# Patient Record
Sex: MALE | Race: WHITE | NOT HISPANIC OR LATINO | URBAN - METROPOLITAN AREA
[De-identification: names, ages, dates, MRNs, and addresses within clinical notes are randomized per-mention and may not be internally consistent; named-entity substitution may affect disease eponyms.]

---

## 2019-04-24 ENCOUNTER — OUTPATIENT (OUTPATIENT)
Dept: OUTPATIENT SERVICES | Facility: HOSPITAL | Age: 59
LOS: 1 days | Discharge: HOME | End: 2019-04-24
Payer: COMMERCIAL

## 2019-04-24 DIAGNOSIS — R91.8 OTHER NONSPECIFIC ABNORMAL FINDING OF LUNG FIELD: ICD-10-CM

## 2019-04-24 PROCEDURE — 71250 CT THORAX DX C-: CPT | Mod: 26

## 2019-07-23 ENCOUNTER — EMERGENCY (EMERGENCY)
Facility: HOSPITAL | Age: 59
LOS: 0 days | Discharge: HOME | End: 2019-07-24
Attending: EMERGENCY MEDICINE | Admitting: EMERGENCY MEDICINE
Payer: COMMERCIAL

## 2019-07-23 VITALS
RESPIRATION RATE: 18 BRPM | HEIGHT: 73 IN | OXYGEN SATURATION: 99 % | SYSTOLIC BLOOD PRESSURE: 136 MMHG | DIASTOLIC BLOOD PRESSURE: 79 MMHG | HEART RATE: 96 BPM | WEIGHT: 225.09 LBS | TEMPERATURE: 98 F

## 2019-07-23 DIAGNOSIS — M79.602 PAIN IN LEFT ARM: ICD-10-CM

## 2019-07-23 DIAGNOSIS — Z87.891 PERSONAL HISTORY OF NICOTINE DEPENDENCE: ICD-10-CM

## 2019-07-23 DIAGNOSIS — R07.89 OTHER CHEST PAIN: ICD-10-CM

## 2019-07-23 DIAGNOSIS — R07.9 CHEST PAIN, UNSPECIFIED: ICD-10-CM

## 2019-07-23 LAB
HCT VFR BLD CALC: 41.7 % — LOW (ref 42–52)
HGB BLD-MCNC: 14 G/DL — SIGNIFICANT CHANGE UP (ref 14–18)
MCHC RBC-ENTMCNC: 29.6 PG — SIGNIFICANT CHANGE UP (ref 27–31)
MCHC RBC-ENTMCNC: 33.6 G/DL — SIGNIFICANT CHANGE UP (ref 32–37)
MCV RBC AUTO: 88.2 FL — SIGNIFICANT CHANGE UP (ref 80–94)
NRBC # BLD: 0 /100 WBCS — SIGNIFICANT CHANGE UP (ref 0–0)
PLATELET # BLD AUTO: 192 K/UL — SIGNIFICANT CHANGE UP (ref 130–400)
RBC # BLD: 4.73 M/UL — SIGNIFICANT CHANGE UP (ref 4.7–6.1)
RBC # FLD: 13.2 % — SIGNIFICANT CHANGE UP (ref 11.5–14.5)
WBC # BLD: 5.39 K/UL — SIGNIFICANT CHANGE UP (ref 4.8–10.8)
WBC # FLD AUTO: 5.39 K/UL — SIGNIFICANT CHANGE UP (ref 4.8–10.8)

## 2019-07-23 PROCEDURE — 93010 ELECTROCARDIOGRAM REPORT: CPT

## 2019-07-23 PROCEDURE — 99285 EMERGENCY DEPT VISIT HI MDM: CPT | Mod: 25

## 2019-07-23 NOTE — ED ADULT NURSE NOTE - NSIMPLEMENTINTERV_GEN_ALL_ED
Implemented All Universal Safety Interventions:  [place] to call system. Call bell, personal items and telephone within reach. Instruct patient to call for assistance. Room bathroom lighting operational. Non-slip footwear when patient is off stretcher. Physically safe environment: no spills, clutter or unnecessary equipment. Stretcher in lowest position, wheels locked, appropriate side rails in place.

## 2019-07-24 VITALS
RESPIRATION RATE: 18 BRPM | TEMPERATURE: 97 F | HEART RATE: 70 BPM | DIASTOLIC BLOOD PRESSURE: 89 MMHG | SYSTOLIC BLOOD PRESSURE: 157 MMHG | OXYGEN SATURATION: 98 %

## 2019-07-24 LAB
ALBUMIN SERPL ELPH-MCNC: 3.9 G/DL — SIGNIFICANT CHANGE UP (ref 3.5–5.2)
ALP SERPL-CCNC: 73 U/L — SIGNIFICANT CHANGE UP (ref 30–115)
ALT FLD-CCNC: 31 U/L — SIGNIFICANT CHANGE UP (ref 0–41)
ANION GAP SERPL CALC-SCNC: 10 MMOL/L — SIGNIFICANT CHANGE UP (ref 7–14)
APTT BLD: 35.2 SEC — SIGNIFICANT CHANGE UP (ref 27–39.2)
AST SERPL-CCNC: 25 U/L — SIGNIFICANT CHANGE UP (ref 0–41)
BILIRUB SERPL-MCNC: 0.4 MG/DL — SIGNIFICANT CHANGE UP (ref 0.2–1.2)
BUN SERPL-MCNC: 18 MG/DL — SIGNIFICANT CHANGE UP (ref 10–20)
CALCIUM SERPL-MCNC: 9.4 MG/DL — SIGNIFICANT CHANGE UP (ref 8.5–10.1)
CHLORIDE SERPL-SCNC: 106 MMOL/L — SIGNIFICANT CHANGE UP (ref 98–110)
CO2 SERPL-SCNC: 28 MMOL/L — SIGNIFICANT CHANGE UP (ref 17–32)
CREAT SERPL-MCNC: 1 MG/DL — SIGNIFICANT CHANGE UP (ref 0.7–1.5)
GLUCOSE SERPL-MCNC: 105 MG/DL — HIGH (ref 70–99)
INR BLD: 1.13 RATIO — SIGNIFICANT CHANGE UP (ref 0.65–1.3)
POTASSIUM SERPL-MCNC: 4.7 MMOL/L — SIGNIFICANT CHANGE UP (ref 3.5–5)
POTASSIUM SERPL-SCNC: 4.7 MMOL/L — SIGNIFICANT CHANGE UP (ref 3.5–5)
PROT SERPL-MCNC: 6.7 G/DL — SIGNIFICANT CHANGE UP (ref 6–8)
PROTHROM AB SERPL-ACNC: 13 SEC — HIGH (ref 9.95–12.87)
SODIUM SERPL-SCNC: 144 MMOL/L — SIGNIFICANT CHANGE UP (ref 135–146)
TROPONIN T SERPL-MCNC: <0.01 NG/ML — SIGNIFICANT CHANGE UP
TROPONIN T SERPL-MCNC: <0.01 NG/ML — SIGNIFICANT CHANGE UP

## 2019-07-24 PROCEDURE — 75574 CT ANGIO HRT W/3D IMAGE: CPT | Mod: 26

## 2019-07-24 PROCEDURE — 99234 HOSP IP/OBS SM DT SF/LOW 45: CPT

## 2019-07-24 PROCEDURE — 71045 X-RAY EXAM CHEST 1 VIEW: CPT | Mod: 26

## 2019-07-24 PROCEDURE — 93010 ELECTROCARDIOGRAM REPORT: CPT

## 2019-07-24 RX ORDER — ACETAMINOPHEN 500 MG
650 TABLET ORAL ONCE
Refills: 0 | Status: COMPLETED | OUTPATIENT
Start: 2019-07-24 | End: 2019-07-24

## 2019-07-24 RX ORDER — METOPROLOL TARTRATE 50 MG
100 TABLET ORAL ONCE
Refills: 0 | Status: DISCONTINUED | OUTPATIENT
Start: 2019-07-24 | End: 2019-07-24

## 2019-07-24 RX ORDER — ASPIRIN/CALCIUM CARB/MAGNESIUM 324 MG
325 TABLET ORAL ONCE
Refills: 0 | Status: COMPLETED | OUTPATIENT
Start: 2019-07-24 | End: 2019-07-24

## 2019-07-24 RX ADMIN — Medication 325 MILLIGRAM(S): at 00:54

## 2019-07-24 RX ADMIN — Medication 650 MILLIGRAM(S): at 14:14

## 2019-07-24 NOTE — ED CDU PROVIDER DISPOSITION NOTE - CARE PROVIDER_API CALL
Jessie Mcclure)  Cardiovascular Disease; Internal Medicine; Interventional Cardiology  [address] [place] [address]  Phone: [phone]  Fax: [phone]  Follow Up Time:

## 2019-07-24 NOTE — ED CDU PROVIDER INITIAL DAY NOTE - ATTENDING CONTRIBUTION TO CARE
I personally evaluated the patient. I reviewed the Resident’s or Physician Assistant’s note (as assigned above), and agree with the findings and plan except as documented in my note.   58 Y/O M FORMER SMOKER, NO SIG PMHX WITH 10 DAYS OF CP. PT WAS SEEN BY DR. BROWN ON THE DAY PF PRESENTATION IN THE OFFICE AND EKG AT THAT TIME WAS NORMAL. PT PRESENTED TO ED WITH PERSISTENT CP. INITIAL ED WORKUP NORMAL. TROPONIN NORMAL AND EKG  WITH NO ISCHEMIC CHANGES. PT TO EDOU FOR CHEST PAIN PROTOCOL AND CCTA IN AM.

## 2019-07-24 NOTE — ED PROVIDER NOTE - OBJECTIVE STATEMENT
59y m former smoker no pmh p/w CP x 1.5 week. Rpts intermitt mid-sternal chest pressure felt @ night (wakes up from sleep) and when lying flat, @ rest, non-radiating, usu resolves spontaneously within 10 min. Also rpts recent george, but denies any sob felt when he has chest pain. No assoc f/c, diaphoresis, dizziness, cough, nv, abd pain, calf pain, edema. Saw his Cardiologist Dr. Mcclure today for his sx, had ekg in office that was normal, was sent home but pt said that if pain happened again he would come to ED. [place] tonight when lying down so came in. No recent falls or trauma. Frequent plane travel for work, last flew to [place] 1 week ago. Had stress test in Oct 2018 that was normal per pt. PMD Carmen. No FHx HD.

## 2019-07-24 NOTE — ED ADULT NURSE REASSESSMENT NOTE - NS ED NURSE REASSESS COMMENT FT1
received pt from main ED nurse .Pt presents at the main ED c/o left arm pain and right sided chest pain with right upper back pain x 10 days ..Right now , pt is resting comfortably on bed , denies chest pain this time , denies headache , denies dizziness , ambulatory , denies nausea, no vomiting noted , no labored breathing , SR on the monitor , troponin x 1 negative , safety measures provided , nursing rounds done , will continue to monitor

## 2019-07-24 NOTE — ED CDU PROVIDER DISPOSITION NOTE - ATTENDING CONTRIBUTION TO CARE
I personally evaluated the patient. I reviewed the Resident’s or Physician Assistant’s note (as assigned above), and agree with the findings and plan except as documented in my note.   50 Y/O M NO SIG PMHX, FORMER SMOKER PRESNETED TO ED WITH COMPLAINTS OF CP. INITIAL ED WORKUP NORMAL. EKG NORMAL. TROPONIN NORMAL. PT TO EDOU FOR SERIAL TROPONINS, EKGS AND PROVOCATIVE TESTING. PT WITH NO COMPLAINTS OVERNIGHT. PT RESTING COMFORTABLY. EXAM NORMAL THROUGHTOUT EDOU STAY. CCTA WITH CALCIUM SCORE-0. PT WITH 40MM ASCENDING THORACIC DILATION. RESULTS D/W PT. PT WITH NO PMD. PT GIVEN AN APPT IN MEDICAL CLINIC TOMORROW AT 4PM AND GIEN COPIES OF ALL RESULTS. TAA EXPLAINED TO PT AND IMPORTANCE OF FOLLOWUP STRESSED TO PT. PT VERBALIZED UNDERSTANDING. PT GIVEN STRICT RETURN INSTRUCTIONS. I personally evaluated the patient. I reviewed the Resident’s or Physician Assistant’s note (as assigned above), and agree with the findings and plan except as documented in my note.   60 Y/O M FORMER SMOKER, NO SIG PMHX WITH 10 DAYS OF CP. PT WAS SEEN BY DR. BROWN ON THE DAY PF PRESENTATION IN THE OFFICE AND EKG AT THAT TIME WAS NORMAL. PT PRESENTED TO ED WITH PERSISTENT CP. INITIAL ED WORKUP NORMAL. TROPONIN NORMAL AND EKG  WITH NO ISCHEMIC CHANGES. PT TO EDOU FOR CHEST PAIN PROTOCOL. NO EVENTS OVERNIGHT. CCTA RESULTS APPRECIATED. PT TO FOLLOW UP WITH HIS CARDIOLOGIST AS AN OUTPT.

## 2019-07-24 NOTE — ED ADULT NURSE REASSESSMENT NOTE - NS ED NURSE REASSESS COMMENT FT1
Pt reports right sided and left sided dull chest pain 6;'/10 , physician assistant at the bedside and examined the pt .Chest pain lasted for about 3 minutes from 0659 and was relieved at 0702.Pt denies any chest pain as of this time , denies headache , denies dizziness , denies arm pain , denies neck pain , SR on the monitor , vitals signs stable , no labored breathing , speaks in full sentences .EKG ordered and called EKG tech , endorsed pt to day shift primary nurse

## 2019-07-24 NOTE — ED CDU PROVIDER INITIAL DAY NOTE - OBJECTIVE STATEMENT
60y/o male with no significant pmh c/o left arm pain and right sided cp associated with right upper back pain for about 10 days. denies sob, fever, cough, vomiting, abdominal pain. pt. describes pain as "squeezing ". pt. states the pain only comes when he is supine and he has to sit up to alleviate the pain. no cp on exertion. last stress test was on 10/2019 which was normal as per pt. before pt. came to er today he went to see his cardiologist dr. Polanco, had an ekg done in his office and was sent home. pt. continued to have cp at home so he came here for evaluation. not a smoker.

## 2019-07-24 NOTE — ED CDU PROVIDER INITIAL DAY NOTE - MEDICAL DECISION MAKING DETAILS
58 Y/O M FORMER SMOKER, NO SIG PMHX WITH 10 DAYS OF CP. PT WAS SEEN BY DR. BROWN ON THE DAY PF PRESENTATION IN THE OFFICE AND EKG AT THAT TIME WAS NORMAL. PT PRESENTED TO ED WITH PERSISTENT CP. INITIAL ED WORKUP NORMAL. TROPONIN NORMAL AND EKG  WITH NO ISCHEMIC CHANGES. PT TO EDOU FOR CHEST PAIN PROTOCOL AND CCTA IN AM.

## 2019-07-24 NOTE — ED PROVIDER NOTE - PHYSICAL EXAMINATION
VITAL SIGNS: I have reviewed nursing notes and confirm.  CONSTITUTIONAL: Well-developed; well-nourished; in no acute distress.  SKIN: Skin exam is warm and dry, no acute rash.  HEAD: Normocephalic; atraumatic.  EYES: PERRL, EOM intact; conjunctiva and sclera clear.  ENT: No nasal discharge; airway clear.  NECK: Supple; non tender.  CARD: S1, S2 normal; no murmurs, gallops, or rubs. Regular rate and rhythm.  RESP: CTAB. No wheezes, rales or rhonchi.  ABD: Normal bowel sounds; soft; non-distended; non-tender; no hepatosplenomegaly.  BACK: non-tender, no CVAT  EXT: Normal ROM. No clubbing, cyanosis or edema. No calf erythema or ttp. DPI.  NEURO: Alert, oriented. Grossly unremarkable. No focal deficits.  PSYCH: Cooperative, appropriate.

## 2019-07-24 NOTE — ED ADULT NURSE REASSESSMENT NOTE - NS ED NURSE REASSESS COMMENT FT1
pt assessed at bedside, in NAD at this time, VSS, no c/o CP at this time, NSR on monitor, comfort measures offered/maintained. Pt had 1 episode of CP this morning that lasted few minutes

## 2019-07-24 NOTE — ED CDU PROVIDER DISPOSITION NOTE - CLINICAL COURSE
48 Y/O M NO SIG PMHX, FORMER SMOKER PRESNETED TO ED WITH COMPLAINTS OF CP. INITIAL ED WORKUP NORMAL. EKG NORMAL. TROPONIN NORMAL. PT TO EDOU FOR SERIAL TROPONINS, EKGS AND PROVOCATIVE TESTING. PT WITH NO COMPLAINTS OVERNIGHT. PT RESTING COMFORTABLY. EXAM NORMAL THROUGHTOUT EDOU STAY. CCTA WITH CALCIUM SCORE-0. PT WITH 40MM ASCENDING THORACIC DILATION. RESULTS D/W PT. PT WITH NO PMD. PT GIVEN AN APPT IN MEDICAL CLINIC TOMORROW AT 4PM AND GIEN COPIES OF ALL RESULTS. TAA EXPLAINED TO PT AND IMPORTANCE OF FOLLOWUP STRESSED TO PT. PT VERBALIZED UNDERSTANDING. PT GIVEN STRICT RETURN INSTRUCTIONS. 60 Y/O M FORMER SMOKER, NO SIG PMHX WITH 10 DAYS OF CP. PT WAS SEEN BY DR. BROWN ON THE DAY PF PRESENTATION IN THE OFFICE AND EKG AT THAT TIME WAS NORMAL. PT PRESENTED TO ED WITH PERSISTENT CP. INITIAL ED WORKUP NORMAL. TROPONIN NORMAL AND EKG  WITH NO ISCHEMIC CHANGES. PT TO EDOU FOR CHEST PAIN PROTOCOL. NO EVENTS OVERNIGHT. CCTA RESULTS APPRECIATED. PT TO FOLLOW UP WITH HIS CARDIOLOGIST AS AN OUTPT.

## 2019-09-24 ENCOUNTER — INPATIENT (INPATIENT)
Facility: HOSPITAL | Age: 59
LOS: 1 days | Discharge: HOME | End: 2019-09-26
Attending: INTERNAL MEDICINE | Admitting: INTERNAL MEDICINE
Payer: COMMERCIAL

## 2019-09-24 VITALS
TEMPERATURE: 97 F | DIASTOLIC BLOOD PRESSURE: 82 MMHG | SYSTOLIC BLOOD PRESSURE: 140 MMHG | WEIGHT: 229.94 LBS | HEIGHT: 73 IN | OXYGEN SATURATION: 98 % | HEART RATE: 73 BPM | RESPIRATION RATE: 18 BRPM

## 2019-09-24 DIAGNOSIS — E78.5 HYPERLIPIDEMIA, UNSPECIFIED: ICD-10-CM

## 2019-09-24 DIAGNOSIS — I25.110 ATHEROSCLEROTIC HEART DISEASE OF NATIVE CORONARY ARTERY WITH UNSTABLE ANGINA PECTORIS: ICD-10-CM

## 2019-09-24 DIAGNOSIS — K80.20 CALCULUS OF GALLBLADDER WITHOUT CHOLECYSTITIS WITHOUT OBSTRUCTION: ICD-10-CM

## 2019-09-24 DIAGNOSIS — I21.4 NON-ST ELEVATION (NSTEMI) MYOCARDIAL INFARCTION: ICD-10-CM

## 2019-09-24 DIAGNOSIS — R00.0 TACHYCARDIA, UNSPECIFIED: ICD-10-CM

## 2019-09-24 DIAGNOSIS — Z84.89 FAMILY HISTORY OF OTHER SPECIFIED CONDITIONS: ICD-10-CM

## 2019-09-24 LAB
ALBUMIN SERPL ELPH-MCNC: 4.4 G/DL — SIGNIFICANT CHANGE UP (ref 3.5–5.2)
ALP SERPL-CCNC: 76 U/L — SIGNIFICANT CHANGE UP (ref 30–115)
ALT FLD-CCNC: 36 U/L — SIGNIFICANT CHANGE UP (ref 0–41)
ANION GAP SERPL CALC-SCNC: 10 MMOL/L — SIGNIFICANT CHANGE UP (ref 7–14)
AST SERPL-CCNC: 25 U/L — SIGNIFICANT CHANGE UP (ref 0–41)
BILIRUB SERPL-MCNC: 0.8 MG/DL — SIGNIFICANT CHANGE UP (ref 0.2–1.2)
BUN SERPL-MCNC: 17 MG/DL — SIGNIFICANT CHANGE UP (ref 10–20)
CALCIUM SERPL-MCNC: 9.7 MG/DL — SIGNIFICANT CHANGE UP (ref 8.5–10.1)
CHLORIDE SERPL-SCNC: 102 MMOL/L — SIGNIFICANT CHANGE UP (ref 98–110)
CO2 SERPL-SCNC: 27 MMOL/L — SIGNIFICANT CHANGE UP (ref 17–32)
CREAT SERPL-MCNC: 1 MG/DL — SIGNIFICANT CHANGE UP (ref 0.7–1.5)
D DIMER BLD IA.RAPID-MCNC: 48 NG/ML DDU — SIGNIFICANT CHANGE UP (ref 0–230)
GLUCOSE BLDC GLUCOMTR-MCNC: 89 MG/DL — SIGNIFICANT CHANGE UP (ref 70–99)
GLUCOSE SERPL-MCNC: 90 MG/DL — SIGNIFICANT CHANGE UP (ref 70–99)
HCT VFR BLD CALC: 46.3 % — SIGNIFICANT CHANGE UP (ref 42–52)
HGB BLD-MCNC: 15.5 G/DL — SIGNIFICANT CHANGE UP (ref 14–18)
MCHC RBC-ENTMCNC: 29.6 PG — SIGNIFICANT CHANGE UP (ref 27–31)
MCHC RBC-ENTMCNC: 33.5 G/DL — SIGNIFICANT CHANGE UP (ref 32–37)
MCV RBC AUTO: 88.4 FL — SIGNIFICANT CHANGE UP (ref 80–94)
NRBC # BLD: 0 /100 WBCS — SIGNIFICANT CHANGE UP (ref 0–0)
PLATELET # BLD AUTO: 209 K/UL — SIGNIFICANT CHANGE UP (ref 130–400)
POTASSIUM SERPL-MCNC: 4.8 MMOL/L — SIGNIFICANT CHANGE UP (ref 3.5–5)
POTASSIUM SERPL-SCNC: 4.8 MMOL/L — SIGNIFICANT CHANGE UP (ref 3.5–5)
PROT SERPL-MCNC: 7.1 G/DL — SIGNIFICANT CHANGE UP (ref 6–8)
RBC # BLD: 5.24 M/UL — SIGNIFICANT CHANGE UP (ref 4.7–6.1)
RBC # FLD: 12.8 % — SIGNIFICANT CHANGE UP (ref 11.5–14.5)
SODIUM SERPL-SCNC: 139 MMOL/L — SIGNIFICANT CHANGE UP (ref 135–146)
TROPONIN T SERPL-MCNC: 0.19 NG/ML — CRITICAL HIGH
TROPONIN T SERPL-MCNC: 0.26 NG/ML — CRITICAL HIGH
WBC # BLD: 7 K/UL — SIGNIFICANT CHANGE UP (ref 4.8–10.8)
WBC # FLD AUTO: 7 K/UL — SIGNIFICANT CHANGE UP (ref 4.8–10.8)

## 2019-09-24 PROCEDURE — 93010 ELECTROCARDIOGRAM REPORT: CPT

## 2019-09-24 PROCEDURE — 99221 1ST HOSP IP/OBS SF/LOW 40: CPT

## 2019-09-24 PROCEDURE — 99285 EMERGENCY DEPT VISIT HI MDM: CPT

## 2019-09-24 PROCEDURE — 71275 CT ANGIOGRAPHY CHEST: CPT | Mod: 26

## 2019-09-24 PROCEDURE — 71046 X-RAY EXAM CHEST 2 VIEWS: CPT | Mod: 26

## 2019-09-24 PROCEDURE — 76705 ECHO EXAM OF ABDOMEN: CPT | Mod: 26

## 2019-09-24 RX ORDER — CHLORHEXIDINE GLUCONATE 213 G/1000ML
1 SOLUTION TOPICAL
Refills: 0 | Status: DISCONTINUED | OUTPATIENT
Start: 2019-09-24 | End: 2019-09-26

## 2019-09-24 RX ORDER — ASPIRIN/CALCIUM CARB/MAGNESIUM 324 MG
325 TABLET ORAL ONCE
Refills: 0 | Status: COMPLETED | OUTPATIENT
Start: 2019-09-24 | End: 2019-09-24

## 2019-09-24 RX ORDER — HEPARIN SODIUM 5000 [USP'U]/ML
5000 INJECTION INTRAVENOUS; SUBCUTANEOUS EVERY 8 HOURS
Refills: 0 | Status: DISCONTINUED | OUTPATIENT
Start: 2019-09-24 | End: 2019-09-24

## 2019-09-24 RX ORDER — INFLUENZA VIRUS VACCINE 15; 15; 15; 15 UG/.5ML; UG/.5ML; UG/.5ML; UG/.5ML
0.5 SUSPENSION INTRAMUSCULAR ONCE
Refills: 0 | Status: COMPLETED | OUTPATIENT
Start: 2019-09-24 | End: 2019-09-24

## 2019-09-24 RX ORDER — ATORVASTATIN CALCIUM 80 MG/1
80 TABLET, FILM COATED ORAL AT BEDTIME
Refills: 0 | Status: DISCONTINUED | OUTPATIENT
Start: 2019-09-24 | End: 2019-09-26

## 2019-09-24 RX ORDER — CLOPIDOGREL BISULFATE 75 MG/1
300 TABLET, FILM COATED ORAL ONCE
Refills: 0 | Status: COMPLETED | OUTPATIENT
Start: 2019-09-24 | End: 2019-09-24

## 2019-09-24 RX ORDER — PANTOPRAZOLE SODIUM 20 MG/1
40 TABLET, DELAYED RELEASE ORAL
Refills: 0 | Status: DISCONTINUED | OUTPATIENT
Start: 2019-09-24 | End: 2019-09-24

## 2019-09-24 RX ORDER — ASPIRIN/CALCIUM CARB/MAGNESIUM 324 MG
81 TABLET ORAL DAILY
Refills: 0 | Status: DISCONTINUED | OUTPATIENT
Start: 2019-09-25 | End: 2019-09-26

## 2019-09-24 RX ORDER — HEPARIN SODIUM 5000 [USP'U]/ML
5000 INJECTION INTRAVENOUS; SUBCUTANEOUS ONCE
Refills: 0 | Status: COMPLETED | OUTPATIENT
Start: 2019-09-24 | End: 2019-09-24

## 2019-09-24 RX ORDER — SODIUM CHLORIDE 9 MG/ML
1000 INJECTION, SOLUTION INTRAVENOUS
Refills: 0 | Status: DISCONTINUED | OUTPATIENT
Start: 2019-09-24 | End: 2019-09-24

## 2019-09-24 RX ORDER — CLOPIDOGREL BISULFATE 75 MG/1
75 TABLET, FILM COATED ORAL DAILY
Refills: 0 | Status: DISCONTINUED | OUTPATIENT
Start: 2019-09-25 | End: 2019-09-25

## 2019-09-24 RX ORDER — HEPARIN SODIUM 5000 [USP'U]/ML
1000 INJECTION INTRAVENOUS; SUBCUTANEOUS
Qty: 25000 | Refills: 0 | Status: DISCONTINUED | OUTPATIENT
Start: 2019-09-24 | End: 2019-09-25

## 2019-09-24 RX ORDER — METOPROLOL TARTRATE 50 MG
25 TABLET ORAL DAILY
Refills: 0 | Status: DISCONTINUED | OUTPATIENT
Start: 2019-09-24 | End: 2019-09-26

## 2019-09-24 RX ADMIN — HEPARIN SODIUM 5000 UNIT(S): 5000 INJECTION INTRAVENOUS; SUBCUTANEOUS at 22:33

## 2019-09-24 RX ADMIN — CLOPIDOGREL BISULFATE 300 MILLIGRAM(S): 75 TABLET, FILM COATED ORAL at 22:34

## 2019-09-24 RX ADMIN — Medication 25 MILLIGRAM(S): at 22:51

## 2019-09-24 RX ADMIN — HEPARIN SODIUM 10 UNIT(S)/HR: 5000 INJECTION INTRAVENOUS; SUBCUTANEOUS at 22:33

## 2019-09-24 RX ADMIN — Medication 325 MILLIGRAM(S): at 17:14

## 2019-09-24 NOTE — ED PROVIDER NOTE - ATTENDING CONTRIBUTION TO CARE
58 yo M presents to ED for CP that woke him up from sleep but resolved at 4am this morning. He has had a cardiac and GI workup. No recent travel, surgeries, LE swelling, history of DVT or PE.  EKG significant for tachycardia.     Due to recent normal coronaries ACS unlikely but will do screening troponin. Pain resolved at 4am therefore one troponin should be sufficient.     Will do abdominal US to ru gallbladder pathology.     Due to CP with tachycardia will order ddimer to ru DVT.

## 2019-09-24 NOTE — CONSULT NOTE ADULT - ASSESSMENT
59M with acute cholecystitis     Plan:   NPO, IVF  Pain control   Will discuss with surgical attending 59M with intermittent back pain radiating over RUQ and chest     Plan:   Pain is not likely to be caused by acute cholecystitis   No indication for surgical intervention at this time   Patient should be work up for elevated troponin in the setting of his chest pain   Cleared from a surgical standpoint

## 2019-09-24 NOTE — CONSULT NOTE ADULT - ATTENDING COMMENTS
upon my examination patient does not have any abdominal pain or tenderness. patient's pain  was mostly in the back and not associated with meals. Unlikely to have cholecystitis.   in light of elevated troponins back pain is likely due to MI - cardiology

## 2019-09-24 NOTE — H&P ADULT - ASSESSMENT
59M with acute cholecystitis     Plan:   NPO, IVF  Pain control   Full set of labs including type and screen and coags   Add on for lap ashlie today   HSQ, PPI   IS, SCDs
60 yo M comes to the ED for the atypical chest pain and RUQ tenderness.    # Atypical chest pain  - Aortic dissection ruled out  - CCTA in July was normal  - Aspirin Plavix, Lipitor and BB   - Heparin drip ACS protocol  - Check HbA1c, Lipid profile  - NPO after midnight for possible cath  - ECHO to rule out pericarditis     # RUQ pain  - Very minimal tenderness present on my exam  - Surgery follow up     # DVT ppx  - Heparin   - Plan discussed with the cardiology fellow

## 2019-09-24 NOTE — ED PROVIDER NOTE - CLINICAL SUMMARY MEDICAL DECISION MAKING FREE TEXT BOX
58 yo M presents to ED for CP. Patient had initial troponin of 0.19 will trend. Abdominal US demonstrated cholecystitis and surgery was consulted and would like him admitted. 60 yo M presents to ED for CP. Patient had US which was concerning for cholecystitis. Surgery was consulted but they do not want to admit him for surgery. Patient's initial troponin was 0.19. His repeat was 0.26. ASA given. Patient is not having any active CP.   Talked with the cardiologist. Patient admitted to medicine for NSTEMI.

## 2019-09-24 NOTE — ED PROVIDER NOTE - PHYSICAL EXAMINATION
VITAL SIGNS: I have reviewed nursing notes and confirm.  CONSTITUTIONAL: well-appearing, non-toxic, NAD  SKIN: Warm dry, normal skin turgor  HEAD: NCAT  CARD: regular rhythm, no murmurs, rubs or gallops. Mild tachycardia.  RESP: clear to ausculation b/l.  No rales, rhonchi, or wheezing.  ABD: soft, + BS, non-tender, non-distended, no rebound or guarding. No CVA tenderness  EXT: Full ROM, no bony tenderness, no pedal edema, no calf tenderness  NEURO: normal motor. normal sensory. CN II-XII intact. Cerebellar testing normal. Normal gait.  PSYCH: Cooperative, appropriate.

## 2019-09-24 NOTE — H&P ADULT - NSICDXNOPASTSURGICALHX_GEN_ALL_CORE
<-- Click to add NO significant Past Surgical History
<-- Click to add NO significant Past Surgical History

## 2019-09-24 NOTE — H&P ADULT - NSHPPHYSICALEXAM_GEN_ALL_CORE
PHYSICAL EXAM:  GENERAL: NAD, well-appearing  CHEST/LUNG: Clear to auscultation bilaterally  HEART: Regular rate and rhythm  ABDOMEN: Soft, mildly tender in RUQ, nondistended, slight murphys sign
T(C): 35.6 (09-24-19 @ 21:02), Max: 36.3 (09-24-19 @ 16:43)  HR: 68 (09-24-19 @ 22:00) (68 - 80)  BP: 154/96 (09-24-19 @ 22:00) (140/82 - 158/85)  RR: 22 (09-24-19 @ 22:00) (18 - 22)  SpO2: 98% (09-24-19 @ 22:00) (98% - 98%)    PHYSICAL EXAM:  GENERAL: NAD, well-developed  HEAD:  Atraumatic, Normocephalic  NECK: Supple, No JVD  CHEST/LUNG: Clear to auscultation bilaterally; No wheeze  HEART: Regular rate and rhythm; No murmurs, rubs, or gallops  ABDOMEN: Soft, Nontender, Nondistended; Bowel sounds present  EXTREMITIES:  2+ Peripheral Pulses, No clubbing, cyanosis, or edema  PSYCH: AAOx3  NEUROLOGY: non-focal  SKIN: No rashes or lesions

## 2019-09-24 NOTE — CONSULT NOTE ADULT - SUBJECTIVE AND OBJECTIVE BOX
VICKY SPENCER [id number]  59y Male    HPI:   Patient is a 59 year old male who denies any significant PMH who presented to the ED with sharp RUQ abdominal pain that woke him up from sleep this AM. He states that the pain radiates to his back and lower chest wall as well. Of note, he has been having vague CP/Abdominal pain for the past 3-4 months that was worked up including Upper endoscopy and CT that did not show any significant pathology. He denies any other symptoms at this time. In the ED RUQ US was done that showed evidence of acute cholecystitis.       PAST MEDICAL & SURGICAL HISTORY:  No pertinent past medical history  No significant past surgical history    MEDICATIONS  (STANDING):    MEDICATIONS  (PRN):      Allergies:  Allergy Status Unknown    REVIEW OF SYSTEMS    [x] A ten-point review of systems was otherwise negative except as noted.  [ ] Due to altered mental status/intubation, subjective information were not able to be obtained from the patient. History was obtained, to the extent possible, from review of the chart and collateral sources of information.      Vital Signs Last 24 Hrs  T(C): 36.2 (24 Sep 2019 09:18), Max: 36.2 (24 Sep 2019 09:18)  T(F): 97.2 (24 Sep 2019 09:18), Max: 97.2 (24 Sep 2019 09:18)  HR: 73 (24 Sep 2019 09:18) (73 - 73)  BP: 140/82 (24 Sep 2019 09:18) (140/82 - 140/82)  BP(mean): --  RR: 18 (24 Sep 2019 09:18) (18 - 18)  SpO2: 98% (24 Sep 2019 09:18) (98% - 98%)    PHYSICAL EXAM:  GENERAL: NAD, well-appearing  CHEST/LUNG: Clear to auscultation bilaterally  HEART: Regular rate and rhythm  ABDOMEN: Soft, tender in RUQ, nondistended      LABS:                      15.5   7.00  )-----------( 209      ( 24 Sep 2019 11:01 )             46.3         09-24    139  |  102  |  17  ----------------------------<  90  4.8   |  27  |  1.0      Calcium, Total Serum: 9.7 mg/dL (09-24-19 @ 11:01)      LFTs:             7.1  | 0.8  | 25       ------------------[76      ( 24 Sep 2019 11:01 )  4.4  | x    | 36             RADIOLOGY & ADDITIONAL STUDIES:  < from: US Abdomen Limited (09.24.19 @ 11:52) >    GALLBLADDER/BILIARY TREE:  Cholelithiasis; nonmobile stone seen in the   gallbladder neck. Thickened, edematous gallbladder wall measures 3 mm.   Trace pericholecystic fluid noted. Reported positive sonographic Lim's   sign. No intrahepatic biliary ductal dilatation. The common bile duct   measures 4 mm, which is normal.     IMPRESSION:    1.Cholelithiasis with sonographic findings compatible with acute   cholecystitis.    2.  Hepatic steatosis.    < end of copied text >

## 2019-09-24 NOTE — H&P ADULT - NSHPLABSRESULTS_GEN_ALL_CORE
.  LABS:                         15.5   7.00  )-----------( 209      ( 24 Sep 2019 11:01 )             46.3     09-24    139  |  102  |  17  ----------------------------<  90  4.8   |  27  |  1.0    Ca    9.7      24 Sep 2019 11:01    TPro  7.1  /  Alb  4.4  /  TBili  0.8  /  DBili  x   /  AST  25  /  ALT  36  /  AlkPhos  76  09-24        RADIOLOGY, EKG & ADDITIONAL TESTS: Reviewed.   < from: US Abdomen Limited (09.24.19 @ 11:52) >    GALLBLADDER/BILIARY TREE:  Cholelithiasis; nonmobile stone seen in the   gallbladder neck. Thickened, edematous gallbladder wall measures 3 mm.   Trace pericholecystic fluid noted. Reported positive sonographic Lim's   sign. No intrahepatic biliary ductal dilatation. The common bile duct   measures 4 mm, which is normal.     IMPRESSION:    1.Cholelithiasis with sonographic findings compatible with acute   cholecystitis.    2.  Hepatic steatosis.    < end of copied text >
15.5   7.00  )-----------( 209      ( 24 Sep 2019 11:01 )             46.3       09-24    139  |  102  |  17  ----------------------------<  90  4.8   |  27  |  1.0    Ca    9.7      24 Sep 2019 11:01    TPro  7.1  /  Alb  4.4  /  TBili  0.8  /  DBili  x   /  AST  25  /  ALT  36  /  AlkPhos  76  09-24        < from: 12 Lead ECG (09.24.19 @ 18:08) >    Ventricular Rate 73 BPM    Atrial Rate 73 BPM    P-R Interval 152 ms    QRS Duration 88 ms    Q-T Interval 406 ms    QTC Calculation(Bezet) 447 ms    P Axis 58 degrees    R Axis 41 degrees    T Axis 9 degrees    Diagnosis Line Normal sinus rhythm  Normal ECG      < end of copied text >

## 2019-09-24 NOTE — CONSULT NOTE ADULT - SUBJECTIVE AND OBJECTIVE BOX
HPI:  59 yrs old with no PMH presented for chest pain . pt has been having intermittent back/chest pain since 2 months , had a negative CCTA back in July.  he described as tearing back pain radiating to his chest and left arm , lasting for 30 min then resolves spontaneously. last time he had this episode was last night at 3am. currently pain free. pt denied any other assoicated SX . no bleeding Disorder . + Family Hx of Aortic Dissection.    PAST MEDICAL & SURGICAL HISTORY  No pertinent past medical history  No significant past surgical history      FAMILY HISTORY:  FAMILY HISTORY:  Aortic Dissection    SOCIAL HISTORY:  [-]smoker  [-]Alcohol  [-]Drug    ALLERGIES:  Allergy Status Unknown      MEDICATIONS:  MEDICATIONS  (STANDING):  aspirin 325 milliGRAM(s) Oral Once    MEDICATIONS  (PRN):      HOME MEDICATIONS:  Home Medications:  none    VITALS:   T(F): 97.2 (09-24 @ 09:18), Max: 97.2 (09-24 @ 09:18)  HR: 73 (09-24 @ 09:18) (73 - 73)  BP: 140/82 (09-24 @ 09:18) (140/82 - 140/82)  BP(mean): --  RR: 18 (09-24 @ 09:18) (18 - 18)  SpO2: 98% (09-24 @ 09:18) (98% - 98%)    I&O's Summary      REVIEW OF SYSTEMS:  CONSTITUTIONAL: No weakness, fevers or chills  EYES: No visual changes  ENT: No vertigo or throat pain   NECK: No pain or stiffness  RESPIRATORY: No cough, wheezing, hemoptysis; No shortness of breath  CARDIOVASCULAR: see HPI  GASTROINTESTINAL: No abdominal or epigastric pain. No nausea, vomiting, or hematemesis; No diarrhea or constipation. No melena or hematochezia.  GENITOURINARY: No dysuria, frequency or hematuria  NEUROLOGICAL: No numbness or weakness  SKIN: No itching, no rashes  MSK: no MSK pain    PHYSICAL EXAM:  NEURO: patient is awake , alert and oriented  GEN: Not in acute distress  NECK: no thyroid enlargement, no JVD  LUNGS: Clear to auscultation bilaterally   CARDIOVASCULAR: S1/S2 present, RRR , no murmurs  ABD: Soft, non-tender, non-distended, +BS  EXT: No TRINO  SKIN: Intact    LABS:                        15.5   7.00  )-----------( 209      ( 24 Sep 2019 11:01 )             46.3     09-24    139  |  102  |  17  ----------------------------<  90  4.8   |  27  |  1.0    Ca    9.7      24 Sep 2019 11:01    TPro  7.1  /  Alb  4.4  /  TBili  0.8  /  DBili  x   /  AST  25  /  ALT  36  /  AlkPhos  76  09-24      Troponin T, Serum: 0.26 ng/mL <HH> (09-24-19 @ 14:40)  Troponin T, Serum: 0.19 ng/mL <HH> (09-24-19 @ 11:01)    CARDIAC MARKERS ( 24 Sep 2019 14:40 )  x     / 0.26 ng/mL / x     / x     / x      CARDIAC MARKERS ( 24 Sep 2019 11:01 )  x     / 0.19 ng/mL / x     / x     / x          RADIOLOGY:  -CXR:< from: Xray Chest 2 Views PA/Lat (09.24.19 @ 14:23) >  Impression:      No radiographic evidence of acute cardiopulmonary disease.    < end of copied text >    CCTA < from: CT Heart with Coronaries (07.24.19 @ 12:14) >  Patent coronary arteries. No significant stenosis. The total [place]at[place]   coronary artery calcium score equals 0. CAD-RADS 0.    < end of copied text >      ECG:  sinus with non specific Diffuse St depressions

## 2019-09-24 NOTE — ED PROVIDER NOTE - NS ED ROS FT
Constitutional: See HPI.  Cardiac: No SOB or edema. No chest pain with exertion.  Respiratory: No cough or respiratory distress. No hemoptysis. No history of asthma or RAD.  GI: No nausea, vomiting, diarrhea or abdominal pain.  : No dysuria, frequency or burning. No Discharge  MS: No myalgia, muscle weakness, joint pain or back pain.  Neuro: No headache or weakness. No LOC.  Skin: No skin rash.  Except as documented in the HPI, all other systems are negative. Constitutional: See HPI.  Cardiac: No SOB or edema. No chest pain with exertion. +CP  Respiratory: No cough or respiratory distress. No hemoptysis. No history of asthma or RAD.  GI: No nausea, vomiting, diarrhea or abdominal pain.  : No dysuria, frequency or burning. No Discharge  MS: No myalgia, muscle weakness, joint pain or back pain.  Neuro: No headache or weakness. No LOC.  Skin: No skin rash.  Except as documented in the HPI, all other systems are negative.

## 2019-09-24 NOTE — ED PROVIDER NOTE - PROGRESS NOTE DETAILS
Patient has acute cholecystitis on US. Surgery consulted Surgery would like patient admitted for acute cholecystitis Surgery decided they do not want patient admitted. Will repeat troponin at 3pm. to determine dispo Talked with cardiology. They recommend CTA dissection study. Ordered and talked to MAR who will fu results.

## 2019-09-24 NOTE — ED ADULT TRIAGE NOTE - CHIEF COMPLAINT QUOTE
"every night for the past 2 months I have upper middle back pain that radiates to my chest and down my left "

## 2019-09-24 NOTE — H&P ADULT - HISTORY OF PRESENT ILLNESS
Patient is a 59 year old male who denies any significant PMH who presented to the ED with sharp RUQ abdominal pain that woke him up from sleep this AM. He states that the pain radiates to his back and lower chest wall as well. Of note, he has been having vague CP/Abdominal pain for the past 3-4 months that was worked up including Upper endoscopy and CT that did not show any significant pathology. He denies any other symptoms at this time. In the ED RUQ US was done that showed evidence of acute cholecystitis.
58 yo M with no significant PMH comes to the ED for the chest pain x 3 months. The pain is non exertional, tearing in nature starts in the back and radiates to the front and in the left arm. The pain lasts for few minutes to few hours and resolve on its own.    He denies any sob, palpitations, leg swelling, heart burn symptoms. He recently had CCTA in July that was normal. EGD was also done recently that is normal. The gastroenterologist was thinking to start cardizem for the possible esophageal spasm. He had some RUQ tenderness. The US in the ED showed cholelithiasis and acute cholecystitis . He denies nausea, vomiting or odynophagia.

## 2019-09-24 NOTE — CONSULT NOTE ADULT - ASSESSMENT
chest/back pain: r/o aortic dissection , ? Angina ( pain typical , with mild St depressions on ECG and elevated trop , however pt had normal CCTA 2 months ago)      recommendation :  check CTA to r/o aortic dissection  repeat CE , check CKMB  admit to CCU  hold on asa/ or AC for now until CTA result  check 2d echo  monitor BP  plan to follow after CTA result  will discuss with attending

## 2019-09-24 NOTE — ED PROVIDER NOTE - OBJECTIVE STATEMENT
Patient is a 58 y/o M with no significant pmhx complaining of back, chest, and arm pain for 3 to 4 months' duration that awakens him from sleep. He states that the pain originates in his mid upper back and radiates into his chest and then his left arm in particular. He rates the pain as a 10/10 severity and lasts for 30 minutes to an hour. He initially tried aspirin and tylenol with no relief of pain so he has discontinued using them. He denies associated symptoms such as exertional chest pain, shortness of breath, nausea, vomiting, headache, dizziness, changes in urinary and bowel habits, sick contacts, and recent travel outside the [place]. Patient recently went to his cardiologist and had a cardiac CT scan done which did not show any significant abnormalities. He also went to GI and had an endoscopy done which did not show any abnormalities. Patient is a 58 y/o M with no significant pmhx complaining of back, chest, and arm pain for 3 to 4 months' duration that awakens him from sleep. He states that the pain originates in his mid upper back and radiates into his chest and then his left arm in particular. He rates the pain as a 10/10 severity and lasts for 30 minutes to an hour. He initially tried aspirin and tylenol with no relief of pain so he has discontinued using them. He denies associated symptoms such as exertional chest pain, shortness of breath, nausea, vomiting, headache, dizziness, changes in urinary and bowel habits, sick contacts, and recent travel outside the [place]. Patient recently went to his cardiologist and had a cardiac CT scan done which did not show any significant abnormalities. He also went to GI and had an endoscopy done which did not show any abnormalities.  In ED patient is currently asymptomatic.   He does get pain after eating fatty meals.

## 2019-09-24 NOTE — H&P ADULT - NSICDXNOPASTMEDICALHX_GEN_ALL_CORE
<-- Click to add NO pertinent Past Medical History
<-- Click to add NO pertinent Past Medical History

## 2019-09-25 LAB
ALBUMIN SERPL ELPH-MCNC: 4 G/DL — SIGNIFICANT CHANGE UP (ref 3.5–5.2)
ALP SERPL-CCNC: 70 U/L — SIGNIFICANT CHANGE UP (ref 30–115)
ALT FLD-CCNC: 34 U/L — SIGNIFICANT CHANGE UP (ref 0–41)
ANION GAP SERPL CALC-SCNC: 11 MMOL/L — SIGNIFICANT CHANGE UP (ref 7–14)
APTT BLD: 36.6 SEC — SIGNIFICANT CHANGE UP (ref 27–39.2)
APTT BLD: 47 SEC — HIGH (ref 27–39.2)
AST SERPL-CCNC: 23 U/L — SIGNIFICANT CHANGE UP (ref 0–41)
BASOPHILS # BLD AUTO: 0.02 K/UL — SIGNIFICANT CHANGE UP (ref 0–0.2)
BASOPHILS NFR BLD AUTO: 0.4 % — SIGNIFICANT CHANGE UP (ref 0–1)
BILIRUB SERPL-MCNC: 0.6 MG/DL — SIGNIFICANT CHANGE UP (ref 0.2–1.2)
BUN SERPL-MCNC: 15 MG/DL — SIGNIFICANT CHANGE UP (ref 10–20)
CALCIUM SERPL-MCNC: 9.1 MG/DL — SIGNIFICANT CHANGE UP (ref 8.5–10.1)
CHLORIDE SERPL-SCNC: 103 MMOL/L — SIGNIFICANT CHANGE UP (ref 98–110)
CHOLEST SERPL-MCNC: 168 MG/DL — SIGNIFICANT CHANGE UP (ref 100–200)
CK MB CFR SERPL CALC: 2.8 NG/ML — SIGNIFICANT CHANGE UP (ref 0.6–6.3)
CK MB CFR SERPL CALC: 3.2 NG/ML — SIGNIFICANT CHANGE UP (ref 0.6–6.3)
CK SERPL-CCNC: 103 U/L — SIGNIFICANT CHANGE UP (ref 0–225)
CK SERPL-CCNC: 96 U/L — SIGNIFICANT CHANGE UP (ref 0–225)
CO2 SERPL-SCNC: 27 MMOL/L — SIGNIFICANT CHANGE UP (ref 17–32)
CREAT SERPL-MCNC: 0.7 MG/DL — SIGNIFICANT CHANGE UP (ref 0.7–1.5)
EOSINOPHIL # BLD AUTO: 0.11 K/UL — SIGNIFICANT CHANGE UP (ref 0–0.7)
EOSINOPHIL NFR BLD AUTO: 2.2 % — SIGNIFICANT CHANGE UP (ref 0–8)
ERYTHROCYTE [SEDIMENTATION RATE] IN BLOOD: 17 MM/HR — HIGH (ref 0–10)
ESTIMATED AVERAGE GLUCOSE: 103 MG/DL — SIGNIFICANT CHANGE UP (ref 68–114)
ESTIMATED AVERAGE GLUCOSE: 278 MG/DL — HIGH (ref 68–114)
GLUCOSE BLDC GLUCOMTR-MCNC: 104 MG/DL — HIGH (ref 70–99)
GLUCOSE BLDC GLUCOMTR-MCNC: 117 MG/DL — HIGH (ref 70–99)
GLUCOSE SERPL-MCNC: 87 MG/DL — SIGNIFICANT CHANGE UP (ref 70–99)
HBA1C BLD-MCNC: 11.3 % — HIGH (ref 4–5.6)
HBA1C BLD-MCNC: 5.2 % — SIGNIFICANT CHANGE UP (ref 4–5.6)
HCT VFR BLD CALC: 42.9 % — SIGNIFICANT CHANGE UP (ref 42–52)
HDLC SERPL-MCNC: 33 MG/DL — LOW
HGB BLD-MCNC: 14.4 G/DL — SIGNIFICANT CHANGE UP (ref 14–18)
IMM GRANULOCYTES NFR BLD AUTO: 0.4 % — HIGH (ref 0.1–0.3)
LIPID PNL WITH DIRECT LDL SERPL: 118 MG/DL — SIGNIFICANT CHANGE UP (ref 4–129)
LYMPHOCYTES # BLD AUTO: 1.99 K/UL — SIGNIFICANT CHANGE UP (ref 1.2–3.4)
LYMPHOCYTES # BLD AUTO: 39.6 % — SIGNIFICANT CHANGE UP (ref 20.5–51.1)
MCHC RBC-ENTMCNC: 29.4 PG — SIGNIFICANT CHANGE UP (ref 27–31)
MCHC RBC-ENTMCNC: 33.6 G/DL — SIGNIFICANT CHANGE UP (ref 32–37)
MCV RBC AUTO: 87.6 FL — SIGNIFICANT CHANGE UP (ref 80–94)
MONOCYTES # BLD AUTO: 0.65 K/UL — HIGH (ref 0.1–0.6)
MONOCYTES NFR BLD AUTO: 12.9 % — HIGH (ref 1.7–9.3)
NEUTROPHILS # BLD AUTO: 2.24 K/UL — SIGNIFICANT CHANGE UP (ref 1.4–6.5)
NEUTROPHILS NFR BLD AUTO: 44.5 % — SIGNIFICANT CHANGE UP (ref 42.2–75.2)
NRBC # BLD: 0 /100 WBCS — SIGNIFICANT CHANGE UP (ref 0–0)
PLATELET # BLD AUTO: 186 K/UL — SIGNIFICANT CHANGE UP (ref 130–400)
POTASSIUM SERPL-MCNC: 4.3 MMOL/L — SIGNIFICANT CHANGE UP (ref 3.5–5)
POTASSIUM SERPL-SCNC: 4.3 MMOL/L — SIGNIFICANT CHANGE UP (ref 3.5–5)
PROT SERPL-MCNC: 6.3 G/DL — SIGNIFICANT CHANGE UP (ref 6–8)
RBC # BLD: 4.9 M/UL — SIGNIFICANT CHANGE UP (ref 4.7–6.1)
RBC # FLD: 12.9 % — SIGNIFICANT CHANGE UP (ref 11.5–14.5)
SODIUM SERPL-SCNC: 141 MMOL/L — SIGNIFICANT CHANGE UP (ref 135–146)
TOTAL CHOLESTEROL/HDL RATIO MEASUREMENT: 5.1 RATIO — SIGNIFICANT CHANGE UP (ref 4–5.5)
TRIGL SERPL-MCNC: 237 MG/DL — HIGH (ref 10–149)
TROPONIN T SERPL-MCNC: 0.22 NG/ML — CRITICAL HIGH
TROPONIN T SERPL-MCNC: 0.26 NG/ML — CRITICAL HIGH
WBC # BLD: 5.03 K/UL — SIGNIFICANT CHANGE UP (ref 4.8–10.8)
WBC # FLD AUTO: 5.03 K/UL — SIGNIFICANT CHANGE UP (ref 4.8–10.8)

## 2019-09-25 PROCEDURE — 93306 TTE W/DOPPLER COMPLETE: CPT | Mod: 26

## 2019-09-25 RX ORDER — TICAGRELOR 90 MG/1
1 TABLET ORAL
Qty: 60 | Refills: 0
Start: 2019-09-25 | End: 2019-10-24

## 2019-09-25 RX ORDER — SODIUM CHLORIDE 9 MG/ML
1000 INJECTION INTRAMUSCULAR; INTRAVENOUS; SUBCUTANEOUS
Refills: 0 | Status: DISCONTINUED | OUTPATIENT
Start: 2019-09-25 | End: 2019-09-25

## 2019-09-25 RX ORDER — TICAGRELOR 90 MG/1
90 TABLET ORAL EVERY 12 HOURS
Refills: 0 | Status: DISCONTINUED | OUTPATIENT
Start: 2019-09-25 | End: 2019-09-26

## 2019-09-25 RX ORDER — ACETAMINOPHEN 500 MG
650 TABLET ORAL ONCE
Refills: 0 | Status: COMPLETED | OUTPATIENT
Start: 2019-09-25 | End: 2019-09-25

## 2019-09-25 RX ORDER — IBUPROFEN 200 MG
400 TABLET ORAL ONCE
Refills: 0 | Status: COMPLETED | OUTPATIENT
Start: 2019-09-25 | End: 2019-09-25

## 2019-09-25 RX ORDER — ONDANSETRON 8 MG/1
4 TABLET, FILM COATED ORAL ONCE
Refills: 0 | Status: COMPLETED | OUTPATIENT
Start: 2019-09-25 | End: 2019-09-25

## 2019-09-25 RX ORDER — HEPARIN SODIUM 5000 [USP'U]/ML
5000 INJECTION INTRAVENOUS; SUBCUTANEOUS EVERY 12 HOURS
Refills: 0 | Status: DISCONTINUED | OUTPATIENT
Start: 2019-09-25 | End: 2019-09-26

## 2019-09-25 RX ORDER — ASPIRIN/CALCIUM CARB/MAGNESIUM 324 MG
1 TABLET ORAL
Qty: 30 | Refills: 0
Start: 2019-09-25 | End: 2019-10-24

## 2019-09-25 RX ADMIN — CLOPIDOGREL BISULFATE 75 MILLIGRAM(S): 75 TABLET, FILM COATED ORAL at 07:07

## 2019-09-25 RX ADMIN — ATORVASTATIN CALCIUM 80 MILLIGRAM(S): 80 TABLET, FILM COATED ORAL at 21:25

## 2019-09-25 RX ADMIN — Medication 400 MILLIGRAM(S): at 19:10

## 2019-09-25 RX ADMIN — ONDANSETRON 4 MILLIGRAM(S): 8 TABLET, FILM COATED ORAL at 20:43

## 2019-09-25 RX ADMIN — SODIUM CHLORIDE 50 MILLILITER(S): 9 INJECTION INTRAMUSCULAR; INTRAVENOUS; SUBCUTANEOUS at 14:44

## 2019-09-25 RX ADMIN — HEPARIN SODIUM 5000 UNIT(S): 5000 INJECTION INTRAVENOUS; SUBCUTANEOUS at 18:36

## 2019-09-25 RX ADMIN — Medication 650 MILLIGRAM(S): at 15:15

## 2019-09-25 RX ADMIN — Medication 650 MILLIGRAM(S): at 14:43

## 2019-09-25 RX ADMIN — Medication 81 MILLIGRAM(S): at 07:07

## 2019-09-25 NOTE — PROGRESS NOTE ADULT - SUBJECTIVE AND OBJECTIVE BOX
LENGTH OF HOSPITAL STAY: 1d      CHIEF COMPLAINT:   Patient is a 59y old  Male who presents with a chief complaint of Chest pain (25 Sep 2019 07:28)      OVER Past 24hrs:  The patient was seen and examined at bedside there were    HISTORY OF PRESENTING ILLNESS:    HPI:  60 yo M with no significant PMH comes to the ED for the chest pain x 3 months. The pain is non exertional, tearing in nature starts in the back and radiates to the front and in the left arm. The pain lasts for few minutes to few hours and resolve on its own.    He denies any sob, palpitations, leg swelling, heart burn symptoms. He recently had CCTA in July that was normal. EGD was also done recently that is normal. The gastroenterologist was thinking to start cardizem for the possible esophageal spasm. He had some RUQ tenderness. The US in the ED showed cholelithiasis and acute cholecystitis . He denies nausea, vomiting or odynophagia. (24 Sep 2019 22:07)    PAST MEDICAL & SURGICAL HISTORY  PAST MEDICAL & SURGICAL HISTORY:  No pertinent past medical history  No significant past surgical history        REVIEW OF SYSTEMS  CONSTITUTIONAL: No fever, weight loss, or fatigue  EYES: No eye pain, visual disturbances, or discharge  ENMT:  No difficulty hearing, tinnitus, vertigo; No sinus or throat pain  NECK: No pain or stiffness  BREASTS: No pain, masses, or nipple discharge  RESPIRATORY: No cough, wheezing, chills or hemoptysis; No shortness of breath  CARDIOVASCULAR: No chest pain, palpitations, dizziness, or leg swelling  GASTROINTESTINAL: No abdominal or epigastric pain. No nausea, vomiting, or hematemesis; No diarrhea or constipation. No melena or hematochezia.  GENITOURINARY: No dysuria, frequency, hematuria, or incontinence  NEUROLOGICAL: No headaches, memory loss, loss of strength, numbness, or tremors  SKIN: No itching, burning, rashes, or lesions   LYMPH NODES: No enlarged glands  ENDOCRINE: No heat or cold intolerance; No hair loss  MUSCULOSKELETAL: No joint pain or swelling; No muscle, back, or extremity pain  PSYCHIATRIC: No depression, anxiety, mood swings, or difficulty sleeping  HEME/LYMPH: No easy bruising, or bleeding gums  ALLERY AND IMMUNOLOGIC: No hives or eczema    ALLERGIES:  No Known Allergies    MEDICATIONS:  STANDING MEDICATIONS  aspirin  chewable 81 milliGRAM(s) Oral daily  atorvastatin 80 milliGRAM(s) Oral at bedtime  chlorhexidine 4% Liquid 1 Application(s) Topical <User Schedule>  influenza   Vaccine 0.5 milliLiter(s) IntraMuscular once  metoprolol succinate ER 25 milliGRAM(s) Oral daily  ticagrelor 90 milliGRAM(s) Oral every 12 hours    PRN MEDICATIONS    VITALS:   T(F): 97.7  HR: 74  BP: 153/89  RR: 18  SpO2: 97%    PHYSICAL EXAM:  General: No acute distress.  Alert, oriented, interactive, nonfocal.    HEENT: Pupils equal, reactive to light symmetrically.    PULM: Clear to auscultation bilaterally, no significant sputum production.    CVS: Regular rate and rhythm, no murmurs, rubs, or gallops.    ABD: Soft, nondistended, nontender, no masses.    EXT: No edema, nontender.    SKIN: Warm and well perfused, no rashes noted.    LABS:                        14.4   5.03  )-----------( 186      ( 25 Sep 2019 05:02 )             42.9     09-25    141  |  103  |  15  ----------------------------<  87  4.3   |  27  |  0.7    Ca    9.1      25 Sep 2019 05:02    TPro  6.3  /  Alb  4.0  /  TBili  0.6  /  DBili  x   /  AST  23  /  ALT  34  /  AlkPhos  70  09-25    PTT - ( 25 Sep 2019 07:30 )  PTT:47.0 sec      Troponin T, Serum: 0.26 ng/mL <HH> (09-25-19 @ 05:02)  Creatine Kinase, Serum: 96 U/L (09-25-19 @ 05:02)  Creatine Kinase, Serum: 103 U/L (09-25-19 @ 00:58)  Troponin T, Serum: 0.22 ng/mL <HH> (09-25-19 @ 00:58)  Sedimentation Rate, Erythrocyte: 17 mm/Hr <H> (09-25-19 @ 00:58)  Troponin T, Serum: 0.26 ng/mL <HH> (09-24-19 @ 14:40)      CARDIAC MARKERS ( 25 Sep 2019 05:02 )  x     / 0.26 ng/mL / 96 U/L / x     / 2.8 ng/mL  CARDIAC MARKERS ( 25 Sep 2019 00:58 )  x     / 0.22 ng/mL / 103 U/L / x     / 3.2 ng/mL  CARDIAC MARKERS ( 24 Sep 2019 14:40 )  x     / 0.26 ng/mL / x     / x     / x      CARDIAC MARKERS ( 24 Sep 2019 11:01 )  x     / 0.19 ng/mL / x     / x     / x          RADIOLOGY: LENGTH OF HOSPITAL STAY: 1d      CHIEF COMPLAINT:   Patient is a 59y old  Male who presents with a chief complaint of Chest pain (25 Sep 2019 07:28)      OVER Past 24hrs:  The patient was seen and examined at bedside there were no acute events during the night. He  currently denies fever chills chest pain  or shortness of breath.         PAST MEDICAL & SURGICAL HISTORY  PAST MEDICAL & SURGICAL HISTORY:  No pertinent past medical history  No significant past surgical history        REVIEW OF SYSTEMS  CONSTITUTIONAL: No fever  RESPIRATORY: No cough, wheezing, chills or hemoptysis; No shortness of breath  CARDIOVASCULAR: No chest pain, palpitations, dizziness, or leg swelling    ALLERGIES:  No Known Allergies    MEDICATIONS:  STANDING MEDICATIONS  aspirin  chewable 81 milliGRAM(s) Oral daily  atorvastatin 80 milliGRAM(s) Oral at bedtime  chlorhexidine 4% Liquid 1 Application(s) Topical <User Schedule>  influenza   Vaccine 0.5 milliLiter(s) IntraMuscular once  metoprolol succinate ER 25 milliGRAM(s) Oral daily  ticagrelor 90 milliGRAM(s) Oral every 12 hours    PRN MEDICATIONS    VITALS:   T(F): 97.7  HR: 74  BP: 153/89  RR: 18  SpO2: 97%    PHYSICAL EXAM:  General: No acute distress.  Alert, oriented, interactive, nonfocal. middle Aged  Male     HEENT: Pupils equal, reactive to light symmetrically.    PULM: Clear to auscultation bilaterally, no significant sputum production.    CVS: Regular rate and rhythm, no murmurs, rubs, or gallops.    ABD: Soft, nondistended, nontender, no masses.    EXT: No edema, nontender.    SKIN: Warm and well perfused, no rashes noted.    LABS:                        14.4   5.03  )-----------( 186      ( 25 Sep 2019 05:02 )             42.9     09-25    141  |  103  |  15  ----------------------------<  87  4.3   |  27  |  0.7    Ca    9.1      25 Sep 2019 05:02    TPro  6.3  /  Alb  4.0  /  TBili  0.6  /  DBili  x   /  AST  23  /  ALT  34  /  AlkPhos  70  09-25    PTT - ( 25 Sep 2019 07:30 )  PTT:47.0 sec      Troponin T, Serum: 0.26 ng/mL <HH> (09-25-19 @ 05:02)  Creatine Kinase, Serum: 96 U/L (09-25-19 @ 05:02)  Creatine Kinase, Serum: 103 U/L (09-25-19 @ 00:58)  Troponin T, Serum: 0.22 ng/mL <HH> (09-25-19 @ 00:58)  Sedimentation Rate, Erythrocyte: 17 mm/Hr <H> (09-25-19 @ 00:58)  Troponin T, Serum: 0.26 ng/mL <HH> (09-24-19 @ 14:40)      CARDIAC MARKERS ( 25 Sep 2019 05:02 )  x     / 0.26 ng/mL / 96 U/L / x     / 2.8 ng/mL  CARDIAC MARKERS ( 25 Sep 2019 00:58 )  x     / 0.22 ng/mL / 103 U/L / x     / 3.2 ng/mL  CARDIAC MARKERS ( 24 Sep 2019 14:40 )  x     / 0.26 ng/mL / x     / x     / x      CARDIAC MARKERS ( 24 Sep 2019 11:01 )  x     / 0.19 ng/mL / x     / x     / x          RADIOLOGY:    < from: CT Angio Chest Dissection Protocol (09.24.19 @ 19:13) >    IMPRESSION:   1. No aneurysm, intramural hematoma, or aortic dissection   2. Unchanged chronic left rib fracture deformities  3. Mild infrarenal abdominal aortic atherosclerotic disease  4. Moderate origin ARMAND stenosis  5. Unchanged 5 mm right middle lobe nodule  6. Descending and sigmoid diverticulosis without evidence for   diverticulitis    < end of copied text >  < from: CT Heart with Coronaries (07.24.19 @ 12:14) >  IMPRESSION:     Patent coronary arteries. No significant stenosis. The total Agatston   coronary artery calcium score equals 0. CAD-RADS 0.    < end of copied text >  < from: US Abdomen Limited (09.24.19 @ 11:52) >  IMPRESSION:    1.Cholelithiasis with sonographic findings compatible with acute   cholecystitis.    2.  Hepatic steatosis.    < end of copied text >      FINDINGS    Left Heart Catheterization:  LVEF%: 55  LVEDP: WNL  Right radial 6 Fr  Radial D Stat  Co-dominant                              Left main Normal  LAD: Normal                        Diag: Normal  Left Circumflex:  Mid Cx 95% diffuse lesion  OM: Normal  Right Coronary Artery: Normal  RPDA Normal    INTERVENTION  PCI of mid Cx     IMPLANTS:  Synergy 3.5 28    POST-OP DIAGNOSIS  1 v CAD    PLAN OF CARE  [x ] Return to In-patient bed  [x ]  Continue DAPT, B-blocker & Statin therapy  NS 75 cc/hr for 12 hrs.

## 2019-09-25 NOTE — CHART NOTE - NSCHARTNOTEFT_GEN_A_CORE
PRE-OP DIAGNOSIS: NSTEMI    PROCEDURE: [facility] with coronary angiography    Physician: FABIANA Mcclure  Assistant: STEPHANIE Dowling    ANESTHESIA TYPE:  [ x ] Sedation  [ x ] Local/Regional    ESTIMATED BLOOD LOSS:    10   mL    CONDITION  [ x ]Good    IV CONTRAST: 100   mL    IMPLANTS (IF APPLICABLE)  Synergy 3.5 28    FINDINGS    Left Heart Catheterization:  LVEF%: 55  LVEDP: WNL  Right radial 6 Fr  Radial D Stat  Co-dominant                              Left main Normal  LAD: Normal                        Diag: Normal  Left Circumflex:  Mid Cx 95% diffuse lesion  OM: Normal  Right Coronary Artery: Normal  RPDA Normal    INTERVENTION  PCI of mid Cx     IMPLANTS:  Synergy 3.5 28    POST-OP DIAGNOSIS  1 v CAD    PLAN OF CARE  [x ] Return to In-patient bed  [x ]  Continue DAPT, B-blocker & Statin therapy  NS 75 cc/hr for 12 hrs

## 2019-09-25 NOTE — PROGRESS NOTE ADULT - SUBJECTIVE AND OBJECTIVE BOX
PREOPERATIVE DAY OF PROCEDURE EVALUATION:  I have personally seen and examined the patient.  I agree with the history and physical which I have reviewed and noted any changes below.  (Signed electronically by ___Dr Mcclure_______)  09-25-19 @ 07:29    Pre cath note:    indication:  [ ] STEMI                [x ] NSTEMI                 [ ] Unstable Angina                     [ ] Stable Angina     non-invasive testing:                                               result: [ ] high risk  [ ] intermediate risk  [ ] low risk    Anti- Anginal medications:                    [ ] not used                       [x ] used                   [ ] not used but strong indication not to use    Ejection Fraction                   [ ] >30%            [ ] <30%    COPD none                    [ ] mild (on chronic bronchodilators)  [ ] moderate (on chronic steroid therapy)      [ ] severe (indication for home O2 or PACO2 >50)    Other risk factors:                       [ ] Previous MI                    [ ] CVA/ stroke                    [ ] carotid stent/ CEA                    [ ] PVD- (arterial aneurysm, non-palpable pulses, tortuous vessel with inability to insert catheter, infra-renal dissection, renal or subclavian artery stenosis)                    [ ] Renal Failure                    [ ] diabetic                    [ ] previous CABG

## 2019-09-26 ENCOUNTER — TRANSCRIPTION ENCOUNTER (OUTPATIENT)
Age: 59
End: 2019-09-26

## 2019-09-26 VITALS
RESPIRATION RATE: 19 BRPM | OXYGEN SATURATION: 97 % | SYSTOLIC BLOOD PRESSURE: 122 MMHG | HEART RATE: 78 BPM | DIASTOLIC BLOOD PRESSURE: 84 MMHG

## 2019-09-26 LAB
ANION GAP SERPL CALC-SCNC: 11 MMOL/L — SIGNIFICANT CHANGE UP (ref 7–14)
BASOPHILS # BLD AUTO: 0.02 K/UL — SIGNIFICANT CHANGE UP (ref 0–0.2)
BASOPHILS NFR BLD AUTO: 0.3 % — SIGNIFICANT CHANGE UP (ref 0–1)
BUN SERPL-MCNC: 14 MG/DL — SIGNIFICANT CHANGE UP (ref 10–20)
CALCIUM SERPL-MCNC: 9.6 MG/DL — SIGNIFICANT CHANGE UP (ref 8.5–10.1)
CHLORIDE SERPL-SCNC: 105 MMOL/L — SIGNIFICANT CHANGE UP (ref 98–110)
CO2 SERPL-SCNC: 28 MMOL/L — SIGNIFICANT CHANGE UP (ref 17–32)
CREAT SERPL-MCNC: 0.9 MG/DL — SIGNIFICANT CHANGE UP (ref 0.7–1.5)
EOSINOPHIL # BLD AUTO: 0.11 K/UL — SIGNIFICANT CHANGE UP (ref 0–0.7)
EOSINOPHIL NFR BLD AUTO: 1.8 % — SIGNIFICANT CHANGE UP (ref 0–8)
GLUCOSE SERPL-MCNC: 105 MG/DL — HIGH (ref 70–99)
HCT VFR BLD CALC: 43.6 % — SIGNIFICANT CHANGE UP (ref 42–52)
HCV AB S/CO SERPL IA: 0.18 S/CO — SIGNIFICANT CHANGE UP (ref 0–0.99)
HCV AB SERPL-IMP: SIGNIFICANT CHANGE UP
HGB BLD-MCNC: 14.5 G/DL — SIGNIFICANT CHANGE UP (ref 14–18)
IMM GRANULOCYTES NFR BLD AUTO: 0.2 % — SIGNIFICANT CHANGE UP (ref 0.1–0.3)
LYMPHOCYTES # BLD AUTO: 2.32 K/UL — SIGNIFICANT CHANGE UP (ref 1.2–3.4)
LYMPHOCYTES # BLD AUTO: 38.9 % — SIGNIFICANT CHANGE UP (ref 20.5–51.1)
MCHC RBC-ENTMCNC: 29.5 PG — SIGNIFICANT CHANGE UP (ref 27–31)
MCHC RBC-ENTMCNC: 33.3 G/DL — SIGNIFICANT CHANGE UP (ref 32–37)
MCV RBC AUTO: 88.8 FL — SIGNIFICANT CHANGE UP (ref 80–94)
MONOCYTES # BLD AUTO: 0.7 K/UL — HIGH (ref 0.1–0.6)
MONOCYTES NFR BLD AUTO: 11.7 % — HIGH (ref 1.7–9.3)
NEUTROPHILS # BLD AUTO: 2.8 K/UL — SIGNIFICANT CHANGE UP (ref 1.4–6.5)
NEUTROPHILS NFR BLD AUTO: 47.1 % — SIGNIFICANT CHANGE UP (ref 42.2–75.2)
NRBC # BLD: 0 /100 WBCS — SIGNIFICANT CHANGE UP (ref 0–0)
PLATELET # BLD AUTO: 189 K/UL — SIGNIFICANT CHANGE UP (ref 130–400)
POTASSIUM SERPL-MCNC: 4.7 MMOL/L — SIGNIFICANT CHANGE UP (ref 3.5–5)
POTASSIUM SERPL-SCNC: 4.7 MMOL/L — SIGNIFICANT CHANGE UP (ref 3.5–5)
RBC # BLD: 4.91 M/UL — SIGNIFICANT CHANGE UP (ref 4.7–6.1)
RBC # FLD: 12.8 % — SIGNIFICANT CHANGE UP (ref 11.5–14.5)
SODIUM SERPL-SCNC: 144 MMOL/L — SIGNIFICANT CHANGE UP (ref 135–146)
WBC # BLD: 5.96 K/UL — SIGNIFICANT CHANGE UP (ref 4.8–10.8)
WBC # FLD AUTO: 5.96 K/UL — SIGNIFICANT CHANGE UP (ref 4.8–10.8)

## 2019-09-26 PROCEDURE — 93010 ELECTROCARDIOGRAM REPORT: CPT

## 2019-09-26 PROCEDURE — 99239 HOSP IP/OBS DSCHRG MGMT >30: CPT

## 2019-09-26 RX ORDER — METOPROLOL TARTRATE 50 MG
1 TABLET ORAL
Qty: 30 | Refills: 0
Start: 2019-09-26 | End: 2019-10-25

## 2019-09-26 RX ORDER — ATORVASTATIN CALCIUM 80 MG/1
1 TABLET, FILM COATED ORAL
Qty: 30 | Refills: 0
Start: 2019-09-26 | End: 2019-10-25

## 2019-09-26 RX ADMIN — TICAGRELOR 90 MILLIGRAM(S): 90 TABLET ORAL at 08:11

## 2019-09-26 RX ADMIN — Medication 81 MILLIGRAM(S): at 13:17

## 2019-09-26 RX ADMIN — HEPARIN SODIUM 5000 UNIT(S): 5000 INJECTION INTRAVENOUS; SUBCUTANEOUS at 05:54

## 2019-09-26 RX ADMIN — Medication 25 MILLIGRAM(S): at 05:54

## 2019-09-26 NOTE — DISCHARGE NOTE PROVIDER - HOSPITAL COURSE
58 yo M with no significant PMH comes to the ED for the chest pain x 3 months. The pain is non exertional, tearing in nature starts in the back and radiates to the front and in the left arm. He recently had CCTA in July that was normal. EGD was also done recently that is normal.  Cholecystitis ruled out by surgery team. Taken to cath on 09/25 found to have Mid Cx 95% diffuse lesion  PCI done with Synergy Stent Placement.             IMPRESSION    Unstable Angina found to have Mid Cx 95% diffuse lesion stent placed     DLD    Likely Not DM Hba1c 11 possible lab error FS within normal range will repeat                 S/P PCI midCx LULU x1         	     Continue DAPT ( Brilinta 90 mg PO daily and Aspirin 81 mg PO daily ), B-Blocker, Statin Therapy                     Patient pharmacy called in for Brilinta prescription and it is 5$ per month                       Patient agreeing to take DAPT for at least one year or as directed by cardiologist                      Pt given instructions on importance of taking antiplatelet medication or risk acute stent

## 2019-09-26 NOTE — DISCHARGE NOTE PROVIDER - CARE PROVIDER_API CALL
Jessie Mcclure)  Cardiovascular Disease; Internal Medicine; Interventional Cardiology  [address] [place] [address]  Phone: [phone]  Fax: [phone]  Follow Up Time:

## 2019-09-26 NOTE — DISCHARGE NOTE PROVIDER - NSDCFUADDAPPT_GEN_ALL_CORE_FT
Please, with in one week of discharge follow up with Dr. Mcclure  Please, with in two weeks of discharge follow up with  PMD

## 2019-09-26 NOTE — CHART NOTE - NSCHARTNOTEFT_GEN_A_CORE
<<<RESIDENT DISCHARGE NOTE>>>     VICKY SPENCER  [id number]    60 yo M with no significant PMH comes to the ED for the chest pain x 3 months. The pain is non exertional, tearing in nature starts in the back and radiates to the front and in the left arm. He recently had CCTA in July that was normal. EGD was also done recently that is normal.  Cholecystitis ruled out by surgery team. Taken to cath on 09/25 found to have Mid Cx 95% diffuse lesion  PCI done with Synergy Stent Placement.       IMPRESSION  Unstable Angina found to have Mid Cx 95% diffuse lesion stent placed   DLD  Likely Not DM Hba1c 11 possible lab error FS within normal range will repeat         S/P PCI midCx LULU x1     	     Continue DAPT ( Brilinta 90 mg PO daily and Aspirin 81 mg PO daily ), B-Blocker, Statin Therapy                   Patient pharmacy called in for Brilinta prescription and it is 5$ per month                     Patient agreeing to take DAPT for at least one year or as directed by cardiologist                    Pt given instructions on importance of taking antiplatelet medication or risk acute stent     VITAL SIGNS:  T(F): 97.4 (09-26-19 @ 08:00), Max: 97.4 (09-26-19 @ 08:00)  HR: 72 (09-26-19 @ 10:00)  BP: 123/80 (09-26-19 @ 10:00)  SpO2: 97% (09-26-19 @ 10:00)      PHYSICAL EXAMINATION:  General:   Head & Neck:  Pulmonary:  Cardiovascular:  Gastrointestinal/Abdomen & Pelvis:  Neurologic/Motor:    TEST RESULTS:                        14.5   5.96  )-----------( 189      ( 26 Sep 2019 04:41 )             43.6       09-26    144  |  105  |  14  ----------------------------<  105<H>  4.7   |  28  |  0.9    Ca    9.6      26 Sep 2019 04:41    TPro  6.3  /  Alb  4.0  /  TBili  0.6  /  DBili  x   /  AST  23  /  ALT  34  /  AlkPhos  70  09-25      FINAL DISCHARGE INTERVIEW:  Resident(s) Present: (Name:____Po Hartman _________), RN Present: (Name:  _____Zoya Pham  (RN)______)    DISCHARGE MEDICATION RECONCILIATION  reviewed with Attending (Name:___Dr. Anderson________)    DISPOSITION:   [ x ] Home,    [  ] Home with Visiting Nursing Services,   [    ]  SNF/ NH,    [   ] Acute Rehab (4A),   [   ] Other (Specify:_________) <<<RESIDENT DISCHARGE NOTE>>>     VICKY SPENCER  [id number]    58 yo M with no significant PMH comes to the ED for the chest pain x 3 months. The pain is non exertional, tearing in nature starts in the back and radiates to the front and in the left arm. He recently had CCTA in July that was normal. EGD was also done recently that is normal.  Cholecystitis ruled out by surgery team. Taken to cath on 09/25 found to have Mid Cx 95% diffuse lesion  PCI done with Synergy Stent Placement.       IMPRESSION  Unstable Angina found to have Mid Cx 95% diffuse lesion stent placed   DLD  Likely Not DM Hba1c 11 possible lab error FS within normal range will repeat         S/P PCI midCx LULU x1     	     Continue DAPT ( Brilinta 90 mg PO daily and Aspirin 81 mg PO daily ), B-Blocker, Statin Therapy                   Patient pharmacy called in for Brilinta prescription and it is 5$ per month                     Patient agreeing to take DAPT for at least one year or as directed by cardiologist                    Pt given instructions on importance of taking antiplatelet medication or risk acute stent     VITAL SIGNS:  T(F): 97.4 (09-26-19 @ 08:00), Max: 97.4 (09-26-19 @ 08:00)  HR: 72 (09-26-19 @ 10:00)  BP: 123/80 (09-26-19 @ 10:00)  SpO2: 97% (09-26-19 @ 10:00)      PHYSICAL EXAMINATION:  General: NAD, resting in chair, no active complaints  Head & Neck: NC, AT  Pulmonary: CTA b/l   Cardiovascular: s1s2 RRR, no Murmurs  Gastrointestinal/Abdomen & Pelvis: soft, NT, ND, +BS  Neurologic/Motor: ambulating around unit    TEST RESULTS:                        14.5   5.96  )-----------( 189      ( 26 Sep 2019 04:41 )             43.6       09-26    144  |  105  |  14  ----------------------------<  105<H>  4.7   |  28  |  0.9    Ca    9.6      26 Sep 2019 04:41    TPro  6.3  /  Alb  4.0  /  TBili  0.6  /  DBili  x   /  AST  23  /  ALT  34  /  AlkPhos  70  09-25      FINAL DISCHARGE INTERVIEW:  Resident(s) Present: (Name:____Jacqueakiko Hartman _________), RN Present: (Name:  _____Zoya Pham  (RN)______)    DISCHARGE MEDICATION RECONCILIATION  reviewed with Attending (Name:___Dr. Anderson________)    DISPOSITION:   [ x ] Home,    [  ] Home with Visiting Nursing Services,   [    ]  SNF/ NH,    [   ] Acute Rehab (4A),   [   ] Other (Specify:_________)

## 2019-09-26 NOTE — PROGRESS NOTE ADULT - SUBJECTIVE AND OBJECTIVE BOX
Denies any chest pain, SOB or palpitations.     MEDICATIONS  (STANDING):  aspirin  chewable 81 milliGRAM(s) Oral daily  atorvastatin 80 milliGRAM(s) Oral at bedtime  chlorhexidine 4% Liquid 1 Application(s) Topical <User Schedule>  heparin  Injectable 5000 Unit(s) SubCutaneous every 12 hours  influenza   Vaccine 0.5 milliLiter(s) IntraMuscular once  metoprolol succinate ER 25 milliGRAM(s) Oral daily  ticagrelor 90 milliGRAM(s) Oral every 12 hours    MEDICATIONS  (PRN):            Vital Signs Last 24 Hrs  T(C): 36.1 (26 Sep 2019 00:00), Max: 36.5 (25 Sep 2019 12:30)  T(F): 97 (26 Sep 2019 00:00), Max: 97.7 (25 Sep 2019 12:30)  HR: 68 (26 Sep 2019 04:00) (66 - 80)  BP: 110/69 (26 Sep 2019 04:00) (105/60 - 160/88)  BP(mean): 83 (26 Sep 2019 04:00) (77 - 134)  RR: 13 (26 Sep 2019 04:00) (12 - 26)  SpO2: 95% (26 Sep 2019 04:00) (93% - 97%)     REVIEW OF SYSTEMS:  CONSTITUTIONAL: No fever, weight loss, or fatigue  CARDIOLOGY: PAtient denies chest pain, shortness of breath or syncopal episodes.   RESPIRATORY: denies shortness of breath, wheezeing.   NEUROLOGICAL: NO weakness, no focal deficits to report.  GI: no BRBPR, no N,V,diarrhea.    PSYCHIATRY: normal mood and affect  HEENT: no nasal discharge, no ecchymosis  SKIN: no ecchymosis, no breakdown  MUSCULOSKELETAL: Full range of motion x4.        PHYSICAL EXAM:  · CONSTITUTIONAL:	Well-developed, well nourished    BMI-  ·RESPIRATORY:   airway patent; breath sounds equal; good air movement; respirations non-labored; clear to auscultation bilaterally; no chest wall tenderness; no intercostal retractions; no rales,rhonchi or wheeze  · CARDIOVASCULAR	regular rate and rhythm  no rub  no murmur  normal PMI  · EXTREMITIES: No cyanosis, clubbing or edema  · VASCULAR: 	No evidence of pseudo or hematoma at puncture site  	  TELEMETRY: No events      LABS:                        14.5   5.96  )-----------( 189      ( 26 Sep 2019 04:41 )             43.6     09-25    141  |  103  |  15  ----------------------------<  87  4.3   |  27  |  0.7    Ca    9.1      25 Sep 2019 05:02    TPro  6.3  /  Alb  4.0  /  TBili  0.6  /  DBili  x   /  AST  23  /  ALT  34  /  AlkPhos  70  09-25    CARDIAC MARKERS ( 25 Sep 2019 05:02 )  x     / 0.26 ng/mL / 96 U/L / x     / 2.8 ng/mL  CARDIAC MARKERS ( 25 Sep 2019 00:58 )  x     / 0.22 ng/mL / 103 U/L / x     / 3.2 ng/mL  CARDIAC MARKERS ( 24 Sep 2019 14:40 )  x     / 0.26 ng/mL / x     / x     / x      CARDIAC MARKERS ( 24 Sep 2019 11:01 )  x     / 0.19 ng/mL / x     / x     / x          PTT - ( 25 Sep 2019 17:10 )  PTT:36.6 sec    I&O's Summary    24 Sep 2019 07:01  -  25 Sep 2019 07:00  --------------------------------------------------------  IN: 320 mL / OUT: 150 mL / NET: 170 mL    25 Sep 2019 07:01  -  26 Sep 2019 05:41  --------------------------------------------------------  IN: 960 mL / OUT: 2400 mL / NET: -1440 mL      BNP  RADIOLOGY & ADDITIONAL STUDIES:    IMPRESSION AND PLAN:    ASA 81 + Brilintya 90 bid  Lipitor 80  Metoprolol ER 25   Will F/U in one week

## 2019-09-26 NOTE — DISCHARGE NOTE NURSING/CASE MANAGEMENT/SOCIAL WORK - PATIENT PORTAL LINK FT
You can access the FollowMyHealth Patient Portal offered by [facility] by registering at the following website: [email or web address]. By joining [facility]’s FollowMyHealth portal, you will also be able to view your health information using other applications (apps) compatible with our system.

## 2019-09-26 NOTE — PROGRESS NOTE ADULT - SUBJECTIVE AND OBJECTIVE BOX
Cardiology Follow up    VICKY SPENCER   59y Male  PAST MEDICAL & SURGICAL HISTORY:  No pertinent past medical history  No significant past surgical history       HPI:  60 yo M with no significant PMH comes to the ED for the chest pain x 3 months. The pain is non exertional, tearing in nature starts in the back and radiates to the front and in the left arm. The pain lasts for few minutes to few hours and resolve on its own.    He denies any sob, palpitations, leg swelling, heart burn symptoms. He recently had CCTA in July that was normal. EGD was also done recently that is normal. The gastroenterologist was thinking to start cardizem for the possible esophageal spasm. He had some RUQ tenderness. The US in the ED showed cholelithiasis and acute cholecystitis . He denies nausea, vomiting or odynophagia. (24 Sep 2019 22:07)    Allergies    No Known Allergies    Patient without complaints. Pt ambulated without issues/symptoms  Denies CP, SOB, palpitations, or dizziness  No events on telemetry overnight    Vital Signs Last 24 Hrs  T(C): 36.3 (26 Sep 2019 08:00), Max: 36.5 (25 Sep 2019 12:30)  T(F): 97.4 (26 Sep 2019 08:00), Max: 97.7 (25 Sep 2019 12:30)  HR: 72 (26 Sep 2019 10:00) (66 - 80)  BP: 123/80 (26 Sep 2019 10:00) (105/60 - 160/88)  BP(mean): 95 (26 Sep 2019 10:00) (77 - 134)  RR: 13 (26 Sep 2019 10:00) (12 - 26)  SpO2: 97% (26 Sep 2019 10:00) (93% - 97%)    MEDICATIONS  (STANDING):  aspirin  chewable 81 milliGRAM(s) Oral daily  atorvastatin 80 milliGRAM(s) Oral at bedtime  chlorhexidine 4% Liquid 1 Application(s) Topical <User Schedule>  heparin  Injectable 5000 Unit(s) SubCutaneous every 12 hours  influenza   Vaccine 0.5 milliLiter(s) IntraMuscular once  metoprolol succinate ER 25 milliGRAM(s) Oral daily  ticagrelor 90 milliGRAM(s) Oral every 12 hours    REVIEW OF SYSTEMS:          CONSTITUTIONAL: No weakness, fevers or chills          EYES/ENT: No visual changes;  No vertigo or throat pain           NECK: No pain or stiffness          RESPIRATORY: No cough, wheezing, hemoptysis          CARDIOVASCULAR: no pain, no RILEY, no palpitations           GASTROINTESTINAL: No abdominal or epigastric pain. No nausea, vomiting, or hematemesis;           GENITOURINARY: No dysuria, frequency or hematuria          NEUROLOGICAL: No numbness or weakness          SKIN: No itching, rashes    PHYSICAL EXAM:           CONSTITUTIONAL: Well-developed; well-nourished; in no acute distress  	SKIN: warm, dry  	HEAD: Normocephalic; atraumatic  	EYES: PERRL.  	ENT: No nasal discharge, airway clear, mucous membranes moist  	NECK: Supple; non tender.  	CARD: +S1, +S2, no murmurs, gallops, or rubs. Regular rate and rhythm    	RESP: No wheezes, rales or rhonchi. CTA B/L  	ABD: soft ntnd, + BS x 4 quadrants  	EXT: moves all extremities,  no clubbing, cyanosis or edema  	NEURO: Alert and oriented x3, no focal deficits          PSYCH: Cooperative, appropriate          VASCULAR:  +2 Rad / +2PTs / + 2DPs          EXTREMITY:             Right Radial: Dressing removed, access site soft, no hematoma, no pain, no sign of infection            ECG:   < from: 12 Lead ECG (09.26.19 @ 07:29) >  Ventricular Rate 63 BPM    Atrial Rate 63 BPM    P-R Interval 166 ms    QRS Duration 94 ms    Q-T Interval 412 ms    QTC Calculation(Bezet) 421 ms    P Axis 63 degrees    R Axis 43 degrees    T Axis -1 degrees    Diagnosis Line Normal sinus rhythm  Normal ECG    Confirmed by Greg Powell ([id number]) on 9/26/2019 8:21:33 AM                                                                                 2D ECHO:  < from: Transthoracic Echocardiogram (09.25.19 @ 10:36) >  EXAM:  2-D ECHO (TTE) COMPLETE        PROCEDURE DATE:  09/25/2019      INTERPRETATION:  REPORT:    TRANSTHORACIC ECHOCARDIOGRAM REPORT    Patient Name:   VICKY SPENCER Accession #: [id number]  Medical Rec #:  [id number]        Height:      73.0in 185.4 cm  YOB: 1960       Weight:      223.0 lb 101.15 kg  Patient Age:    59 years        BSA:         2.25 m²  Patient Gender: M               BP:          122/85 mmHg       Date of Exam:        9/25/2019 10:36:46 AM  Referring Physician: [id number] BRE MENDEZ  Sonographer:         Enmanuel Mccarthy  Reading Physician:   Emory Hilario M.D.    Procedure: 2D Echo/Doppler/Color Doppler Complete.  Diagnosis: Chronic ischemic heart disease, unspecified - I25.9     Summary:   1. Left ventricular ejection fraction, by visual estimation, is 60 to   65%.   2. Spectral Doppler shows impaired relaxation pattern of left   ventricular myocardial filling (Grade I diastolic dysfunction).   3. Mild mitral valve regurgitation.    PHYSICIAN INTERPRETATION:  Left Ventricle: Normal left ventricular size and wall thicknesses, with   normal systolic and diastolic function. Left ventricular ejection   fraction, by visual estimation, is 60 to 65%. Spectral Doppler shows   impaired relaxationpattern of left ventricular myocardial filling (Grade   I diastolic dysfunction).  Right Ventricle: Normal right ventricular size and function.  Left Atrium: Normal left atrial size.  Right Atrium: Normal right atrial size.  Pericardium: There is no evidence of pericardial effusion.  Mitral Valve: Structurally normal mitral valve, with normal leaflet   excursion. The mitral valve is normal in structure. Mild mitral valve   regurgitation is seen.  Tricuspid Valve: Structurally normal tricuspid valve, with normal leaflet   excursion. The tricuspid valve is normal in structure. No tricuspid   regurgitation is visualized.  Aortic Valve: Normal trileaflet aortic valve with normal opening. The   aortic valve is normal. No evidence of aortic valve regurgitation is seen.  Pulmonic Valve: Structurally normal pulmonic valve, with normal leaflet   excursion. The pulmonic valve is normal. Mild pulmonic valve   regurgitation.  Aorta: The aortic root and ascending aorta are structurally normal, with   no evidence of dilitation.  Pulmonary Artery: The main pulmonary artery is normal in size.       2D AND M-MODE MEASUREMENTS (normal ranges within parentheses):  Left Ventricle:                  Normal   Aorta/Left Atrium:               Normal  IVSd (2D):              1.23 cm (0.7-1.1) AoV Cusp Separation: 2.11 cm   (1.5-2.6)  LVPWd (2D):             1.16 cm (0.7-1.1) Left Atrium (Mmode): 3.73 cm   (1.9-4.0)  LVIDd (2D):             4.03 cm (3.4-5.7) Right Ventricle:  LVIDs (2D):             2.41 cm           RVd (2D):        3.14 cm  LV FS (2D):             40.3 %   (>25%)  Relative Wall Thickness  0.58    (<0.42)    SPECTRAL DOPPLER ANALYSIS:  LV DIASTOLIC FUNCTION:  MV Peak E: 0.53 m/s Decel Time: 178 msec  MV Peak A: 0.66 m/s  E/A Ratio: 0.79    Aortic Valve:  AoV VMax:    1.07 m/s AoV Area, Vmax: 3.01 cm² Vmax Indx: 1.33 cm²/m²  AoV Pk Grad: 4.6 mmHg    LVOT Vmax: 0.87 m/s  LVOT VTI:  0.17 m  LVOT Diam: 2.16 cm    Mitral Valve:  MV P1/2 Time: 51.62 msec  MV Area, PHT: 4.26 cm²     [id number] Emory Hilario M.D., Electronically signed on 9/25/2019 at 5:14:42   PM    LABS:                        14.5   5.96  )-----------( 189      ( 26 Sep 2019 04:41 )             43.6     09-26    144  |  105  |  14  ----------------------------<  105<H>  4.7   |  28  |  0.9    Ca    9.6      26 Sep 2019 04:41    TPro  6.3  /  Alb  4.0  /  TBili  0.6  /  DBili  x   /  AST  23  /  ALT  34  /  AlkPhos  70  09-25    CARDIAC MARKERS ( 25 Sep 2019 05:02 )  x     / 0.26 ng/mL / 96 U/L / x     / 2.8 ng/mL  CARDIAC MARKERS ( 25 Sep 2019 00:58 )  x     / 0.22 ng/mL / 103 U/L / x     / 3.2 ng/mL  CARDIAC MARKERS ( 24 Sep 2019 14:40 )  x     / 0.26 ng/mL / x     / x     / x      CARDIAC MARKERS ( 24 Sep 2019 11:01 )  x     / 0.19 ng/mL / x     / x     / x        LIVER FUNCTIONS - ( 25 Sep 2019 05:02 )  Alb: 4.0 g/dL / Pro: 6.3 g/dL / ALK PHOS: 70 U/L / ALT: 34 U/L / AST: 23 U/L / GGT: x           A/P:  I discussed the case with Cardiologist Dr. Mcclure and recommend the following:    S/P PCI midCx LULU x1     	     Continue DAPT ( Brilinta 90 mg PO daily and Aspirin 81 mg PO daily ), B-Blocker, Statin Therapy                   Patient pharmacy called in for Brilinta prescription and it is 5$ per month                     Patient agreeing to take DAPT for at least one year or as directed by cardiologist                    Pt given instructions on importance of taking antiplatelet medication or risk acute stent thrombosis/death                   Post cath instructions, access site care and activity restrictions reviewed with patient                     Discussed with patient to return to hospital if experience chest pain, shortness breath, dizziness and site bleeding                   Aggressive risk factor modification, diet counseling, smoking cessation discussed with patient                       Can discharge patient from cardiac standpoint after ambulating without symptoms                   Follow up with Cardiology Dr. Mcclure in one week. Instructed to call and make an appointment

## 2019-09-26 NOTE — DISCHARGE NOTE PROVIDER - NSDCCPCAREPLAN_GEN_ALL_CORE_FT
PRINCIPAL DISCHARGE DIAGNOSIS  Diagnosis: Unstable angina  Assessment and Plan of Treatment:   Please take the following as prescibed:  ASA 81 + Brilintya 90 bid  Lipitor 80  Metoprolol ER 25   Follow up Cardiology in one week.

## 2019-09-27 LAB — GLUCOSE BLDC GLUCOMTR-MCNC: 94 MG/DL — SIGNIFICANT CHANGE UP (ref 70–99)

## 2020-02-20 ENCOUNTER — INPATIENT HOSPITAL (OUTPATIENT)
Dept: URBAN - METROPOLITAN AREA MEDICAL CENTER 14 | Facility: MEDICAL CENTER | Age: 60
Setting detail: OPHTHALMOLOGY
End: 2020-02-20
Payer: COMMERCIAL

## 2020-02-20 DIAGNOSIS — H02.402: ICD-10-CM

## 2020-02-20 PROCEDURE — 99222 1ST HOSP IP/OBS MODERATE 55: CPT | Performed by: OPHTHALMOLOGY

## 2020-03-06 PROBLEM — Z00.00 ENCOUNTER FOR PREVENTIVE HEALTH EXAMINATION: Status: ACTIVE | Noted: 2020-03-06

## 2020-03-09 ENCOUNTER — APPOINTMENT (OUTPATIENT)
Dept: SURGERY | Facility: CLINIC | Age: 60
End: 2020-03-09
Payer: COMMERCIAL

## 2020-03-09 VITALS
HEART RATE: 74 BPM | HEIGHT: 73 IN | TEMPERATURE: 97.9 F | DIASTOLIC BLOOD PRESSURE: 83 MMHG | SYSTOLIC BLOOD PRESSURE: 125 MMHG | BODY MASS INDEX: 26.9 KG/M2 | WEIGHT: 203 LBS

## 2020-03-09 DIAGNOSIS — Z87.442 PERSONAL HISTORY OF URINARY CALCULI: ICD-10-CM

## 2020-03-09 DIAGNOSIS — Z87.891 PERSONAL HISTORY OF NICOTINE DEPENDENCE: ICD-10-CM

## 2020-03-09 DIAGNOSIS — Z78.9 OTHER SPECIFIED HEALTH STATUS: ICD-10-CM

## 2020-03-09 DIAGNOSIS — I24.0 ACUTE CORONARY THROMBOSIS NOT RESULTING IN MYOCARDIAL INFARCTION: ICD-10-CM

## 2020-03-09 DIAGNOSIS — I25.10 ATHEROSCLEROTIC HEART DISEASE OF NATIVE CORONARY ARTERY W/OUT ANGINA PECTORIS: ICD-10-CM

## 2020-03-09 DIAGNOSIS — Z82.5 FAMILY HISTORY OF ASTHMA AND OTHER CHRONIC LOWER RESPIRATORY DISEASES: ICD-10-CM

## 2020-03-09 DIAGNOSIS — Z82.49 FAMILY HISTORY OF ISCHEMIC HEART DISEASE AND OTHER DISEASES OF THE CIRCULATORY SYSTEM: ICD-10-CM

## 2020-03-09 DIAGNOSIS — Z80.0 FAMILY HISTORY OF MALIGNANT NEOPLASM OF DIGESTIVE ORGANS: ICD-10-CM

## 2020-03-09 PROCEDURE — 99214 OFFICE O/P EST MOD 30 MIN: CPT

## 2020-03-10 PROBLEM — Z80.0 FAMILY HISTORY OF LIVER CANCER: Status: ACTIVE | Noted: 2020-03-09

## 2020-03-10 PROBLEM — Z78.9 CAFFEINE USE: Status: ACTIVE | Noted: 2020-03-09

## 2020-03-10 PROBLEM — I24.0 BLOCKAGE OF CORONARY ARTERY OF HEART: Status: ACTIVE | Noted: 2020-03-09

## 2020-03-10 PROBLEM — Z82.5 FAMILY HISTORY OF EMPHYSEMA: Status: ACTIVE | Noted: 2020-03-09

## 2020-03-10 PROBLEM — Z87.891 FORMER SMOKER: Status: ACTIVE | Noted: 2020-03-09

## 2020-03-10 PROBLEM — Z87.442 HISTORY OF KIDNEY STONES: Status: ACTIVE | Noted: 2020-03-10

## 2020-03-10 PROBLEM — I25.10 ASHD (ARTERIOSCLEROTIC HEART DISEASE): Status: ACTIVE | Noted: 2020-03-10

## 2020-03-10 PROBLEM — Z82.49 FAMILY HISTORY OF AORTIC ANEURYSM: Status: ACTIVE | Noted: 2020-03-09

## 2020-03-10 RX ORDER — ATORVASTATIN CALCIUM 80 MG/1
TABLET, FILM COATED ORAL
Refills: 0 | Status: ACTIVE | COMMUNITY

## 2020-03-10 RX ORDER — ASPIRIN 81 MG/1
81 TABLET, CHEWABLE ORAL
Refills: 0 | Status: ACTIVE | COMMUNITY

## 2020-03-10 RX ORDER — TICAGRELOR 60 MG/1
TABLET ORAL
Refills: 0 | Status: ACTIVE | COMMUNITY

## 2020-03-10 RX ORDER — METOPROLOL TARTRATE 75 MG/1
TABLET, FILM COATED ORAL
Refills: 0 | Status: ACTIVE | COMMUNITY

## 2020-03-10 NOTE — ASSESSMENT
[FreeTextEntry1] : Status post percutaneous cholecystostomy, the patient is now feeling much better and is healthy in appearance. The diagnosis of acute cholecystitis is in some question but he does have underlying gallbladder disease. The nature of the problem was discussed in full and the patient will need an eventual elective interval cholecystectomy. The rationale for this was discussed in full. He will first need to be reevaluated by his cardiologist in terms of the timing of the surgery related to his coronary stent insertion, and the management of his antiplatelet therapy perioperatively. Normal timing for the surgery would be about 6 weeks after the procedure but this may need to be delayed based on the above. The patient will return here in about 3-4 weeks for reevaluation and will have a repeat abdominal ultrasound done prior to his return. Appropriate precautions and warning signs were advised for the interim and he will continue having visits by the home care nurse in terms of the management of his drain.  He will inquire with the hospital if any bile cultures are available. The details of laparoscopic cholecystectomy were discussed in full, including the increased risk of open conversion in light of his recent problem, and all their questions were answered. They are happy with the assessment and plan and will proceed as outlined above. They will contact me sooner should any other problems or questions across.

## 2020-03-10 NOTE — PHYSICAL EXAM
[Normal Thyroid] : the thyroid was normal [Normal Breath Sounds] : Normal breath sounds [Normal Heart Sounds] : normal heart sounds [Normal Rate and Rhythm] : normal rate and rhythm [Abdominal Masses] : No abdominal masses [Abdomen Tenderness] : ~T ~M No abdominal tenderness [No HSM] : no hepatosplenomegaly [de-identified] : anicteric, healthy in appearance, no distress [de-identified] : no adenopathy [de-identified] : Soft and not distended. The PC tube exits on the right just above the costal margin. There is no local erythema or induration and the drainage is clear and bilious.  Gallbladder not palpable, Lim's sign negative. No hernias noted.

## 2020-03-10 NOTE — HISTORY OF PRESENT ILLNESS
[de-identified] : The patient comes with his wife and son for followup care after having a percutaneous cholecystostomy in a local hospital in [place]. The patient has had no recent biliary symptoms. He was seen at this hospital in September where he presented with left shoulder and arm pain. He was fpound to have gallstones at that time, but after a surgical consultation, this was not felt to be the problem.  He had a cardiology workup at that time and had elevated troponins, and a coronary stent was inserted. He is unsure as to whether he had an actual myocardial infarction.\par Last month he developed severe upper abdominal pain and went to the local emergency department.  He was admitted and his workup showed no leukocytosis, normal liver functions, a small polyp or stone within the gallbladder with no gallbladder wall thickening or pericholecystic fluid. A HIDA scan showed nonvisualization of the gallbladder. Surgery was not done at that time due to his antiplatelet therapy and in discussion with his cardiologist.  A percutaneous cholecystostomy was performed and he improved significantly. Blood cultures were negative at that time but no gallbladder cultures seem to be available.\par There is no prior history of hepatitis, pancreatitis, or jaundice he has had no recent weight loss.\par He completed his course of oral antibiotics and is now feeling well, with a good appetite and  no complaints other than some discomfort at the tube insertion site.

## 2020-03-10 NOTE — REASON FOR VISIT
[Initial Evaluation] : an initial evaluation [Spouse] : spouse [Family Member] : family member [FreeTextEntry1] : acute cholecystitis

## 2020-03-11 ENCOUNTER — INPATIENT (INPATIENT)
Facility: HOSPITAL | Age: 60
LOS: 0 days | Discharge: HOME HEALTH | End: 2020-03-12
Attending: SURGERY | Admitting: SURGERY
Payer: COMMERCIAL

## 2020-03-11 VITALS
SYSTOLIC BLOOD PRESSURE: 137 MMHG | HEART RATE: 77 BPM | TEMPERATURE: 98 F | DIASTOLIC BLOOD PRESSURE: 78 MMHG | RESPIRATION RATE: 18 BRPM

## 2020-03-11 LAB
ALBUMIN SERPL ELPH-MCNC: 4.7 G/DL — SIGNIFICANT CHANGE UP (ref 3.5–5.2)
ALP SERPL-CCNC: 113 U/L — SIGNIFICANT CHANGE UP (ref 30–115)
ALT FLD-CCNC: 46 U/L — HIGH (ref 0–41)
ANION GAP SERPL CALC-SCNC: 12 MMOL/L — SIGNIFICANT CHANGE UP (ref 7–14)
APPEARANCE UR: CLEAR — SIGNIFICANT CHANGE UP
APTT BLD: 36.2 SEC — SIGNIFICANT CHANGE UP (ref 27–39.2)
AST SERPL-CCNC: 28 U/L — SIGNIFICANT CHANGE UP (ref 0–41)
BASOPHILS # BLD AUTO: 0.01 K/UL — SIGNIFICANT CHANGE UP (ref 0–0.2)
BASOPHILS NFR BLD AUTO: 0.2 % — SIGNIFICANT CHANGE UP (ref 0–1)
BILIRUB SERPL-MCNC: 2.3 MG/DL — HIGH (ref 0.2–1.2)
BILIRUB UR-MCNC: NEGATIVE — SIGNIFICANT CHANGE UP
BLD GP AB SCN SERPL QL: SIGNIFICANT CHANGE UP
BUN SERPL-MCNC: 8 MG/DL — LOW (ref 10–20)
CALCIUM SERPL-MCNC: 9.9 MG/DL — SIGNIFICANT CHANGE UP (ref 8.5–10.1)
CHLORIDE SERPL-SCNC: 99 MMOL/L — SIGNIFICANT CHANGE UP (ref 98–110)
CO2 SERPL-SCNC: 24 MMOL/L — SIGNIFICANT CHANGE UP (ref 17–32)
COLOR SPEC: SIGNIFICANT CHANGE UP
CREAT SERPL-MCNC: 0.7 MG/DL — SIGNIFICANT CHANGE UP (ref 0.7–1.5)
DIFF PNL FLD: NEGATIVE — SIGNIFICANT CHANGE UP
EOSINOPHIL # BLD AUTO: 0.04 K/UL — SIGNIFICANT CHANGE UP (ref 0–0.7)
EOSINOPHIL NFR BLD AUTO: 0.6 % — SIGNIFICANT CHANGE UP (ref 0–8)
GLUCOSE SERPL-MCNC: 121 MG/DL — HIGH (ref 70–99)
GLUCOSE UR QL: NEGATIVE — SIGNIFICANT CHANGE UP
HCT VFR BLD CALC: 43.2 % — SIGNIFICANT CHANGE UP (ref 42–52)
HGB BLD-MCNC: 14.5 G/DL — SIGNIFICANT CHANGE UP (ref 14–18)
IMM GRANULOCYTES NFR BLD AUTO: 0.3 % — SIGNIFICANT CHANGE UP (ref 0.1–0.3)
INR BLD: 1.23 RATIO — SIGNIFICANT CHANGE UP (ref 0.65–1.3)
KETONES UR-MCNC: ABNORMAL
LACTATE SERPL-SCNC: 1.3 MMOL/L — SIGNIFICANT CHANGE UP (ref 0.7–2)
LEUKOCYTE ESTERASE UR-ACNC: NEGATIVE — SIGNIFICANT CHANGE UP
LIDOCAIN IGE QN: 17 U/L — SIGNIFICANT CHANGE UP (ref 7–60)
LYMPHOCYTES # BLD AUTO: 1.13 K/UL — LOW (ref 1.2–3.4)
LYMPHOCYTES # BLD AUTO: 17.7 % — LOW (ref 20.5–51.1)
MCHC RBC-ENTMCNC: 29.3 PG — SIGNIFICANT CHANGE UP (ref 27–31)
MCHC RBC-ENTMCNC: 33.6 G/DL — SIGNIFICANT CHANGE UP (ref 32–37)
MCV RBC AUTO: 87.3 FL — SIGNIFICANT CHANGE UP (ref 80–94)
MONOCYTES # BLD AUTO: 0.42 K/UL — SIGNIFICANT CHANGE UP (ref 0.1–0.6)
MONOCYTES NFR BLD AUTO: 6.6 % — SIGNIFICANT CHANGE UP (ref 1.7–9.3)
NEUTROPHILS # BLD AUTO: 4.75 K/UL — SIGNIFICANT CHANGE UP (ref 1.4–6.5)
NEUTROPHILS NFR BLD AUTO: 74.6 % — SIGNIFICANT CHANGE UP (ref 42.2–75.2)
NITRITE UR-MCNC: NEGATIVE — SIGNIFICANT CHANGE UP
NRBC # BLD: 0 /100 WBCS — SIGNIFICANT CHANGE UP (ref 0–0)
PH UR: 6.5 — SIGNIFICANT CHANGE UP (ref 5–8)
PLATELET # BLD AUTO: 248 K/UL — SIGNIFICANT CHANGE UP (ref 130–400)
POTASSIUM SERPL-MCNC: 4.7 MMOL/L — SIGNIFICANT CHANGE UP (ref 3.5–5)
POTASSIUM SERPL-SCNC: 4.7 MMOL/L — SIGNIFICANT CHANGE UP (ref 3.5–5)
PROT SERPL-MCNC: 7.6 G/DL — SIGNIFICANT CHANGE UP (ref 6–8)
PROT UR-MCNC: SIGNIFICANT CHANGE UP
PROTHROM AB SERPL-ACNC: 14.2 SEC — HIGH (ref 9.95–12.87)
RBC # BLD: 4.95 M/UL — SIGNIFICANT CHANGE UP (ref 4.7–6.1)
RBC # FLD: 12.9 % — SIGNIFICANT CHANGE UP (ref 11.5–14.5)
SODIUM SERPL-SCNC: 135 MMOL/L — SIGNIFICANT CHANGE UP (ref 135–146)
SP GR SPEC: >1.05 (ref 1.01–1.02)
UROBILINOGEN FLD QL: SIGNIFICANT CHANGE UP
WBC # BLD: 6.37 K/UL — SIGNIFICANT CHANGE UP (ref 4.8–10.8)
WBC # FLD AUTO: 6.37 K/UL — SIGNIFICANT CHANGE UP (ref 4.8–10.8)

## 2020-03-11 PROCEDURE — 71046 X-RAY EXAM CHEST 2 VIEWS: CPT | Mod: 26

## 2020-03-11 PROCEDURE — 70450 CT HEAD/BRAIN W/O DYE: CPT | Mod: 26

## 2020-03-11 PROCEDURE — 93010 ELECTROCARDIOGRAM REPORT: CPT

## 2020-03-11 PROCEDURE — 76705 ECHO EXAM OF ABDOMEN: CPT | Mod: 26

## 2020-03-11 PROCEDURE — 93010 ELECTROCARDIOGRAM REPORT: CPT | Mod: 77

## 2020-03-11 PROCEDURE — 99285 EMERGENCY DEPT VISIT HI MDM: CPT

## 2020-03-11 PROCEDURE — 99222 1ST HOSP IP/OBS MODERATE 55: CPT

## 2020-03-11 PROCEDURE — 74177 CT ABD & PELVIS W/CONTRAST: CPT | Mod: 26

## 2020-03-11 RX ORDER — METOCLOPRAMIDE HCL 10 MG
10 TABLET ORAL ONCE
Refills: 0 | Status: COMPLETED | OUTPATIENT
Start: 2020-03-11 | End: 2020-03-11

## 2020-03-11 RX ORDER — METOPROLOL TARTRATE 50 MG
25 TABLET ORAL DAILY
Refills: 0 | Status: DISCONTINUED | OUTPATIENT
Start: 2020-03-11 | End: 2020-03-12

## 2020-03-11 RX ORDER — TICAGRELOR 90 MG/1
90 TABLET ORAL
Refills: 0 | Status: DISCONTINUED | OUTPATIENT
Start: 2020-03-11 | End: 2020-03-12

## 2020-03-11 RX ORDER — MORPHINE SULFATE 50 MG/1
4 CAPSULE, EXTENDED RELEASE ORAL ONCE
Refills: 0 | Status: DISCONTINUED | OUTPATIENT
Start: 2020-03-11 | End: 2020-03-11

## 2020-03-11 RX ORDER — HEPARIN SODIUM 5000 [USP'U]/ML
5000 INJECTION INTRAVENOUS; SUBCUTANEOUS EVERY 8 HOURS
Refills: 0 | Status: DISCONTINUED | OUTPATIENT
Start: 2020-03-11 | End: 2020-03-12

## 2020-03-11 RX ORDER — ATORVASTATIN CALCIUM 80 MG/1
80 TABLET, FILM COATED ORAL AT BEDTIME
Refills: 0 | Status: DISCONTINUED | OUTPATIENT
Start: 2020-03-11 | End: 2020-03-12

## 2020-03-11 RX ORDER — IBUPROFEN 200 MG
600 TABLET ORAL EVERY 8 HOURS
Refills: 0 | Status: DISCONTINUED | OUTPATIENT
Start: 2020-03-11 | End: 2020-03-12

## 2020-03-11 RX ORDER — SODIUM CHLORIDE 9 MG/ML
1000 INJECTION, SOLUTION INTRAVENOUS
Refills: 0 | Status: DISCONTINUED | OUTPATIENT
Start: 2020-03-11 | End: 2020-03-12

## 2020-03-11 RX ORDER — PANTOPRAZOLE SODIUM 20 MG/1
40 TABLET, DELAYED RELEASE ORAL DAILY
Refills: 0 | Status: DISCONTINUED | OUTPATIENT
Start: 2020-03-11 | End: 2020-03-12

## 2020-03-11 RX ORDER — ACETAMINOPHEN 500 MG
650 TABLET ORAL EVERY 6 HOURS
Refills: 0 | Status: DISCONTINUED | OUTPATIENT
Start: 2020-03-11 | End: 2020-03-12

## 2020-03-11 RX ORDER — ASPIRIN/CALCIUM CARB/MAGNESIUM 324 MG
81 TABLET ORAL DAILY
Refills: 0 | Status: DISCONTINUED | OUTPATIENT
Start: 2020-03-11 | End: 2020-03-12

## 2020-03-11 RX ORDER — INFLUENZA VIRUS VACCINE 15; 15; 15; 15 UG/.5ML; UG/.5ML; UG/.5ML; UG/.5ML
0.5 SUSPENSION INTRAMUSCULAR ONCE
Refills: 0 | Status: DISCONTINUED | OUTPATIENT
Start: 2020-03-11 | End: 2020-03-12

## 2020-03-11 RX ADMIN — Medication 104 MILLIGRAM(S): at 12:45

## 2020-03-11 RX ADMIN — MORPHINE SULFATE 4 MILLIGRAM(S): 50 CAPSULE, EXTENDED RELEASE ORAL at 12:45

## 2020-03-11 RX ADMIN — ATORVASTATIN CALCIUM 80 MILLIGRAM(S): 80 TABLET, FILM COATED ORAL at 21:24

## 2020-03-11 RX ADMIN — Medication 1 TABLET(S): at 21:26

## 2020-03-11 RX ADMIN — Medication 10 MILLIGRAM(S): at 15:32

## 2020-03-11 RX ADMIN — MORPHINE SULFATE 4 MILLIGRAM(S): 50 CAPSULE, EXTENDED RELEASE ORAL at 13:15

## 2020-03-11 RX ADMIN — TICAGRELOR 90 MILLIGRAM(S): 90 TABLET ORAL at 23:21

## 2020-03-11 RX ADMIN — MORPHINE SULFATE 4 MILLIGRAM(S): 50 CAPSULE, EXTENDED RELEASE ORAL at 16:54

## 2020-03-11 RX ADMIN — MORPHINE SULFATE 4 MILLIGRAM(S): 50 CAPSULE, EXTENDED RELEASE ORAL at 17:39

## 2020-03-11 RX ADMIN — HEPARIN SODIUM 5000 UNIT(S): 5000 INJECTION INTRAVENOUS; SUBCUTANEOUS at 21:25

## 2020-03-11 RX ADMIN — Medication 81 MILLIGRAM(S): at 17:38

## 2020-03-11 NOTE — H&P ADULT - ASSESSMENT
ASSESSMENT:  59M w/ perc ashlie tube malfxn    PLAN:  IR planning on tube exchange in AM  Following up Dr. Mcclure re: clearance to stop raúl  Admit to Dr. Avalos  NPO, IVF, pain control

## 2020-03-11 NOTE — ED PROVIDER NOTE - CLINICAL SUMMARY MEDICAL DECISION MAKING FREE TEXT BOX
Pt with perc ashlie, imaging shows in gallbladder but gallbladder distended, likely drainage issue, surgery will admit to their service.  IR already aware

## 2020-03-11 NOTE — CONSULT NOTE ADULT - ASSESSMENT
59 year old M with PMHx of kidney stones, CAD with stent on Brilinta (cardio Dr. Mcclure), recent acute cholecystitis s/p perc ashlie, presenting with decreased drainage and RUQ pain.   Cardiology consulted for pre-operative risk stratification for potential perc ashlie revision.     IMPRESSION    CAD s/o PCI to mCx - 09/24/19  Pre-operative risk stratification for fluoroscopic guided replacement of cholecystostomy tube    RECOMMENDATIONS:    - Please obtain EKG  - no ACS, no ADHF  - METS>4, participates independently in all ADL's.  - RCRI-1, patient is intermediate/elevated risk patient undergoing low risk surgery, may proceed if procedural benefits>risk.  - Intraoperatively maintain I>O, f/u routine EKG postoperatively to monitor for perioperative cardiac events.    ATTENDING NOTE TO FOLLOW 59 year old M with PMHx of kidney stones, CAD with stent on Brilinta (cardio Dr. Mcclure), recent acute cholecystitis s/p perc ashlie, presenting with decreased drainage and RUQ pain.   Cardiology consulted for pre-operative risk stratification for potential perc ashlie revision.     IMPRESSION    CAD s/o PCI to mCx - 09/24/19  Pre-operative risk stratification for fluoroscopic guided replacement of cholecystostomy tube    RECOMMENDATIONS:    - Please obtain EKG  - no ACS, no ADHF  - METS>4, participates independently in all ADL's.  - RCRI-1, patient is intermediate/elevated risk patient undergoing low risk surgery, may proceed if procedural benefits>risk.  - Intraoperatively maintain I>O, f/u routine EKG postoperatively to monitor for perioperative cardiac events.

## 2020-03-11 NOTE — CONSULT NOTE ADULT - SUBJECTIVE AND OBJECTIVE BOX
INTERVENTIONAL RADIOLOGY CONSULT:     Procedure Requested: Perc ashlie without output x 2 days    HPI:  58 yo M with perc ashlie placed at OSH last month (2/2020), now with minimal-no output x 2 days.    PAST MEDICAL & SURGICAL HISTORY:  No pertinent past medical history  No significant past surgical history      MEDICATIONS  (STANDING):    MEDICATIONS  (PRN):      Allergies    No Known Allergies    Intolerances        Social History:   Smoking: Yes [ ]  No [ ]   ______pk yrs  ETOH  Yes [ ]  No [ ]  Social [ ]  DRUGS:  Yes [ ]  No [ ]  if so what______________    FAMILY HISTORY:  FH: aortic dissection      Physical Exam:   Vital Signs Last 24 Hrs  T(C): 36.7 (11 Mar 2020 11:32), Max: 36.7 (11 Mar 2020 11:32)  T(F): 98 (11 Mar 2020 11:32), Max: 98 (11 Mar 2020 11:32)  HR: 77 (11 Mar 2020 11:32) (77 - 77)  BP: 137/78 (11 Mar 2020 11:32) (137/78 - 137/78)  BP(mean): --  RR: 18 (11 Mar 2020 11:32) (18 - 18)  SpO2: --    Labs:                         14.5   6.37  )-----------( 248      ( 11 Mar 2020 12:37 )             43.2     03-11    135  |  99  |  8<L>  ----------------------------<  121<H>  4.7   |  24  |  0.7    Ca    9.9      11 Mar 2020 12:37    TPro  7.6  /  Alb  4.7  /  TBili  2.3<H>  /  DBili  x   /  AST  28  /  ALT  46<H>  /  AlkPhos  113  03-11    PT/INR - ( 11 Mar 2020 12:37 )   PT: 14.20 sec;   INR: 1.23 ratio         PTT - ( 11 Mar 2020 12:37 )  PTT:36.2 sec    Pertinent labs:                      14.5   6.37  )-----------( 248      ( 11 Mar 2020 12:37 )             43.2       03-11    135  |  99  |  8<L>  ----------------------------<  121<H>  4.7   |  24  |  0.7    Ca    9.9      11 Mar 2020 12:37    TPro  7.6  /  Alb  4.7  /  TBili  2.3<H>  /  DBili  x   /  AST  28  /  ALT  46<H>  /  AlkPhos  113  03-11      PT/INR - ( 11 Mar 2020 12:37 )   PT: 14.20 sec;   INR: 1.23 ratio         PTT - ( 11 Mar 2020 12:37 )  PTT:36.2 sec    Radiology & Additional Studies:     Radiology imaging reviewed.       ASSESSMENT/ PLAN:   58 yo M with perc ashlie placed at OSH last months (2/2020) now with minimal-no output x2 days.    - CT noncontrast of the abdomen to evaluate tube placement  - Will consider fluoroscopic guided replacement of cholecystostomy tube pending results of CT    Thank you for the courtesy of this consult, please call [phone] with any further questions.

## 2020-03-11 NOTE — H&P ADULT - NSHPPHYSICALEXAM_GEN_ALL_CORE
Vital Signs Last 24 Hrs  T(C): 36.7 (11 Mar 2020 11:32), Max: 36.7 (11 Mar 2020 11:32)  T(F): 98 (11 Mar 2020 11:32), Max: 98 (11 Mar 2020 11:32)  HR: 77 (11 Mar 2020 11:32) (77 - 77)  BP: 137/78 (11 Mar 2020 11:32) (137/78 - 137/78)  BP(mean): --  RR: 18 (11 Mar 2020 11:32) (18 - 18)  SpO2: --    PHYSICAL EXAM:  GENERAL: NAD, well-appearing  CHEST/LUNG: Clear to auscultation bilaterally  HEART: Regular rate and rhythm  ABDOMEN: Soft, epigastrium and RUQ tender, Nondistended, + em sign, drain site c/d/i, able to flush, not draw back. No pain with flushing   EXTREMITIES:  No clubbing, cyanosis, or edema

## 2020-03-11 NOTE — CONSULT NOTE ADULT - ATTENDING COMMENTS
The patient is considered to be a low risk patient going for a low risk procedure. Continue ASA 81 qd + Brilinta 90 bid
Patient seen and examined in ED at 1330 hrs., surgical resident.  He is well known to me from his office consultation 2 days ago. Please see my initial history and physical. Events since that visit as noted above.    Currently the patient feels better after morphine and his headache has improved in addition to his abdominal pain.  Patient is stable, afebrile, anicteric. Abdomen soft and not distended. Minimal tenderness in the right upper quadrant only. Gallbladder not palpable, Lim sign negative. Drain in place, exit site clean and dry.    Blood work shows a normal CBC, normal lipase, minimal elevation of bilirubin and AST.  Head CT done, no specific findings. Abdominal sonogram done and report pending. CT of abdomen requested as per discussion with IR team.    Impression: Probable malfunction of percutaneous cholecystostomy tube. If appropriate the patient will be sent for interrogation and possible replacement of PC tube in IR, this was discussed with Dr. JERICA moore.

## 2020-03-11 NOTE — ED PROVIDER NOTE - PHYSICAL EXAMINATION
VITAL SIGNS: I have reviewed nursing notes and confirm.  CONSTITUTIONAL: Well-developed; well-nourished; in no acute distress.  SKIN: Skin exam is warm and dry, no acute rash.  HEAD: Normocephalic; atraumatic.  EYES: PERRL, EOM intact; conjunctiva and sclera clear.  ENT: No nasal discharge; airway clear. TMs clear.  NECK: Supple; non tender.  CARD: S1, S2 normal; no murmurs, gallops, or rubs. Regular rate and rhythm.  RESP: No wheezes, rales or rhonchi.  ABD: Normal bowel sounds; soft; non-distended; TTP RUQ, no rebound, mild guarding  EXT: Normal ROM. No clubbing, cyanosis or edema.  NEURO: aox3, cn2-12 grossly normal, 5/5 strength all ext, sensation intact, finger to nose normal, romberg neg, normal gait  PSYCH: Cooperative, appropriate.

## 2020-03-11 NOTE — H&P ADULT - NSHPLABSRESULTS_GEN_ALL_CORE
LABS:  Labs:  CAPILLARY BLOOD GLUCOSE      POCT Blood Glucose.: 121 mg/dL (11 Mar 2020 13:41)                          14.5   6.37  )-----------( 248      ( 11 Mar 2020 12:37 )             43.2       Auto Neutrophil %: 74.6 % (03-11-20 @ 12:37)  Auto Immature Granulocyte %: 0.3 % (03-11-20 @ 12:37)    03-11    135  |  99  |  8<L>  ----------------------------<  121<H>  4.7   |  24  |  0.7      Calcium, Total Serum: 9.9 mg/dL (03-11-20 @ 12:37)      LFTs:             7.6  | 2.3  | 28       ------------------[113     ( 11 Mar 2020 12:37 )  4.7  | x    | 46          Lipase:17     Amylase:x         Lactate, Blood: 1.3 mmol/L (03-11-20 @ 12:37)      Coags:     14.20  ----< 1.23    ( 11 Mar 2020 12:37 )     36.2         RADIOLOGY & ADDITIONAL STUDIES:    < from: US Abdomen Limited (03.11.20 @ 15:29) >    IMPRESSION:    Cholecystostomy catheter within the gallbladder, which is full of bile and small stones.    No ductal dilatation.    < end of copied text >    < from: CT Abdomen and Pelvis w/ IV Cont (03.11.20 @ 16:20) >    PERITONEUM/MESENTERY/BOWEL: Colonic diverticulosis. No bowel obstruction. No ascites. Small scattered foci of pneumoperitoneum, which may be postprocedural; correlate for date of cholecystostomy.    BONES/SOFT TISSUES: Stable anterior chest subcutaneous 2.5 cm probable sebaceous cyst. Degenerative change, lumbar spine.    OTHER: Vascular calcifications.      IMPRESSION:   1. Status post percutaneous cholecystostomy placement, with distal catheter terminating within the partially distended gallbladder.  2. Small scattered foci of pneumoperitoneum, which may be postprocedural; correlate for date of cholecystostomy.      < end of copied text >

## 2020-03-11 NOTE — ED PROVIDER NOTE - NS ED ROS FT
Review of Systems:  	•	CONSTITUTIONAL - no fever, no diaphoresis, no weight change  	•	SKIN - no rash  	•	HEMATOLOGIC - no bleeding, no bruising  	•	EYES - no eye pain, no blurred vision  	•	ENT - no change in hearing, no pain  	•	RESPIRATORY - no shortness of breath, no cough  	•	CARDIAC - no chest pain, no palpitations  	•	GI - RUQ abd pain, no nausea, no vomiting, no diarrhea, no constipation, no bleeding  	•	 - no dysuria, no hematuria, no flank pain, no urinary retention  	•	MUSCULOSKELETAL - no joint paint, no swelling, no redness  	•	NEUROLOGIC - no weakness, L headache, no paresthesias, no dizziness  All other systems negative, unless specified in HPI

## 2020-03-11 NOTE — CONSULT NOTE ADULT - SUBJECTIVE AND OBJECTIVE BOX
VICKY SPENCER [id number]  59y Male    HPI:      PAST MEDICAL & SURGICAL HISTORY:  No pertinent past medical history  No significant past surgical history        MEDICATIONS  (STANDING):    MEDICATIONS  (PRN):      Allergies    No Known Allergies    Intolerances        REVIEW OF SYSTEMS    [ ] A ten-point review of systems was otherwise negative except as noted.  [ ] Due to altered mental status/intubation, subjective information were not able to be obtained from the patient. History was obtained, to the extent possible, from review of the chart and collateral sources of information.      Vital Signs Last 24 Hrs  T(C): 36.7 (11 Mar 2020 11:32), Max: 36.7 (11 Mar 2020 11:32)  T(F): 98 (11 Mar 2020 11:32), Max: 98 (11 Mar 2020 11:32)  HR: 77 (11 Mar 2020 11:32) (77 - 77)  BP: 137/78 (11 Mar 2020 11:32) (137/78 - 137/78)  BP(mean): --  RR: 18 (11 Mar 2020 11:32) (18 - 18)  SpO2: --    PHYSICAL EXAM:  GENERAL: NAD, well-appearing  CHEST/LUNG: Clear to auscultation bilaterally  HEART: Regular rate and rhythm  ABDOMEN: Soft, Nontender, Nondistended;   EXTREMITIES:  No clubbing, cyanosis, or edema      LABS:  Labs:  CAPILLARY BLOOD GLUCOSE      POCT Blood Glucose.: 121 mg/dL (11 Mar 2020 13:41)                          14.5   6.37  )-----------( 248      ( 11 Mar 2020 12:37 )             43.2       Auto Neutrophil %: 74.6 % (03-11-20 @ 12:37)  Auto Immature Granulocyte %: 0.3 % (03-11-20 @ 12:37)    03-11    135  |  99  |  8<L>  ----------------------------<  121<H>  4.7   |  24  |  0.7      Calcium, Total Serum: 9.9 mg/dL (03-11-20 @ 12:37)      LFTs:             7.6  | 2.3  | 28       ------------------[113     ( 11 Mar 2020 12:37 )  4.7  | x    | 46          Lipase:17     Amylase:x         Lactate, Blood: 1.3 mmol/L (03-11-20 @ 12:37)      Coags:     14.20  ----< 1.23    ( 11 Mar 2020 12:37 )     36.2                        RADIOLOGY & ADDITIONAL STUDIES: VICKY SPENCER [id number]  59y Male    HPI:  59m s/p PCI 5.5 months ago (cardiologist dr. Chavira) and s/p percutaneous cholecystostomy 2/19/20  at O[facility], followed by Sr. Bailey cabrera, seen most recently 3/9/20 in the office w/ no pain, tenderness, or complications, drain making 300 cc bile/day. shortly thereafter, drain output decreased to 0. Pt presents today with increased RUQ pain and headache.     PAST MEDICAL & SURGICAL HISTORY:  No pertinent past medical history  No significant past surgical history      MEDICATIONS  (STANDING):    MEDICATIONS  (PRN):      Allergies    No Known Allergies      REVIEW OF SYSTEMS    [x] A ten-point review of systems was otherwise negative except as noted.  [ ] Due to altered mental status/intubation, subjective information were not able to be obtained from the patient. History was obtained, to the extent possible, from review of the chart and collateral sources of information.      Vital Signs Last 24 Hrs  T(C): 36.7 (11 Mar 2020 11:32), Max: 36.7 (11 Mar 2020 11:32)  T(F): 98 (11 Mar 2020 11:32), Max: 98 (11 Mar 2020 11:32)  HR: 77 (11 Mar 2020 11:32) (77 - 77)  BP: 137/78 (11 Mar 2020 11:32) (137/78 - 137/78)  BP(mean): --  RR: 18 (11 Mar 2020 11:32) (18 - 18)  SpO2: --    PHYSICAL EXAM:  GENERAL: NAD, well-appearing  CHEST/LUNG: Clear to auscultation bilaterally  HEART: Regular rate and rhythm  ABDOMEN: Soft, epigastrium and RUQ tender, Nondistended, + em sign, drain site c/d/i, able to flush, not draw back. No pain with flushing   EXTREMITIES:  No clubbing, cyanosis, or edema      LABS:  Labs:  CAPILLARY BLOOD GLUCOSE      POCT Blood Glucose.: 121 mg/dL (11 Mar 2020 13:41)                          14.5   6.37  )-----------( 248      ( 11 Mar 2020 12:37 )             43.2       Auto Neutrophil %: 74.6 % (03-11-20 @ 12:37)  Auto Immature Granulocyte %: 0.3 % (03-11-20 @ 12:37)    03-11    135  |  99  |  8<L>  ----------------------------<  121<H>  4.7   |  24  |  0.7      Calcium, Total Serum: 9.9 mg/dL (03-11-20 @ 12:37)      LFTs:             7.6  | 2.3  | 28       ------------------[113     ( 11 Mar 2020 12:37 )  4.7  | x    | 46          Lipase:17     Amylase:x         Lactate, Blood: 1.3 mmol/L (03-11-20 @ 12:37)      Coags:     14.20  ----< 1.23    ( 11 Mar 2020 12:37 )     36.2         RADIOLOGY & ADDITIONAL STUDIES: VICKY SPENCER [id number]  59y Male    HPI:  59m w/ a PMH of kidney stones, CAD s/p PCI 5.5 months ago (cardiologist dr. Chavira) on ASA & brillenta, and s/p percutaneous cholecystostomy 2/19/20 at [facility] in [place] for cholecystitis (+HIDA), followed by Dr. Bailey teixeirapt, seen most recently 3/9/20 in the office w/ no pain, tenderness, or complications, drain making 300 cc bile/day. shortly thereafter, drain output decreased to 0. Pt presents today with increased RUQ pain and headache. Pt denies CP, SOB, n/v/d, fever, chills, urinary symptoms.    PAST MEDICAL & SURGICAL HISTORY:  No pertinent past medical history  No significant past surgical history      MEDICATIONS  (STANDING):    MEDICATIONS  (PRN):      Allergies    No Known Allergies      REVIEW OF SYSTEMS    [x] A ten-point review of systems was otherwise negative except as noted.  [ ] Due to altered mental status/intubation, subjective information were not able to be obtained from the patient. History was obtained, to the extent possible, from review of the chart and collateral sources of information.      Vital Signs Last 24 Hrs  T(C): 36.7 (11 Mar 2020 11:32), Max: 36.7 (11 Mar 2020 11:32)  T(F): 98 (11 Mar 2020 11:32), Max: 98 (11 Mar 2020 11:32)  HR: 77 (11 Mar 2020 11:32) (77 - 77)  BP: 137/78 (11 Mar 2020 11:32) (137/78 - 137/78)  BP(mean): --  RR: 18 (11 Mar 2020 11:32) (18 - 18)  SpO2: --    PHYSICAL EXAM:  GENERAL: NAD, well-appearing  CHEST/LUNG: Clear to auscultation bilaterally  HEART: Regular rate and rhythm  ABDOMEN: Soft, epigastrium and RUQ tender, Nondistended, + em sign, drain site c/d/i, able to flush, not draw back. No pain with flushing   EXTREMITIES:  No clubbing, cyanosis, or edema      LABS:  Labs:  CAPILLARY BLOOD GLUCOSE      POCT Blood Glucose.: 121 mg/dL (11 Mar 2020 13:41)                          14.5   6.37  )-----------( 248      ( 11 Mar 2020 12:37 )             43.2       Auto Neutrophil %: 74.6 % (03-11-20 @ 12:37)  Auto Immature Granulocyte %: 0.3 % (03-11-20 @ 12:37)    03-11    135  |  99  |  8<L>  ----------------------------<  121<H>  4.7   |  24  |  0.7      Calcium, Total Serum: 9.9 mg/dL (03-11-20 @ 12:37)      LFTs:             7.6  | 2.3  | 28       ------------------[113     ( 11 Mar 2020 12:37 )  4.7  | x    | 46          Lipase:17     Amylase:x         Lactate, Blood: 1.3 mmol/L (03-11-20 @ 12:37)      Coags:     14.20  ----< 1.23    ( 11 Mar 2020 12:37 )     36.2         RADIOLOGY & ADDITIONAL STUDIES:

## 2020-03-11 NOTE — CONSULT NOTE ADULT - CONSULT REASON
Recurrent RUQ pain s/p perc ashlie
Cardiac risk stratification for possible fluoroscopic guided replacement of cholecystostomy tube.
Percutaneous cholecystostomy tube with no output x2 days

## 2020-03-11 NOTE — H&P ADULT - HISTORY OF PRESENT ILLNESS
HPI:  59m w/ a PMH of kidney stones, CAD s/p PCI 5.5 months ago (cardiologist dr. Chavira) on ASA & brillenta, and s/p percutaneous cholecystostomy 2/19/20 at [facility] in [place] for cholecystitis (+HIDA), followed by Dr. Bailey cabrera, seen most recently 3/9/20 in the office w/ no pain, tenderness, or complications, drain making 300 cc bile/day. shortly thereafter, drain output decreased to 0. Pt presents today with increased RUQ pain and headache. Pt denies CP, SOB, n/v/d, fever, chills, urinary symptoms.

## 2020-03-11 NOTE — ED PROVIDER NOTE - OBJECTIVE STATEMENT
60 yo m with pmh of kidney stones, CAD with stent on brilinta (cardio dr. gutierrez), recent acute ashlie, has perc ashlie, presents with c/o decreased drainage and RUQ pain.  pt says he had acute ashlie in [place] and surgeons wanted to do surgery, but pt was not cleared by his cardiologist.  perc ashlie was done at [facility].  pt says he was draining everyday and f/u with dr. montano 2 days ago.  pt says after his visit, noted he stopped draining and started having RUQ pain.  pt says now in so much discomfort that hurts to lay down.  no fever, no chills, no sob.  pt also says he developed L headache, no focal numbness, weakness, facial droop or slurred speech.  wife says dr. montano is aware and will be sending a resident to see pt

## 2020-03-11 NOTE — CONSULT NOTE ADULT - SUBJECTIVE AND OBJECTIVE BOX
HPI:  59 year old M with PMHx of kidney stones, CAD with stent on Brilinta (cardio Dr. Mcclure), recent acute cholecystitis s/p perc ashlie, presenting with decreased drainage and RUQ pain. Patient states that he  had acute ashlie in [place] and surgeons wanted to do surgery, but patientt was not cleared by his cardiologist. The perc ashlie was done at [facility]. Patient states that it was draining everyday and he followed up with Dr. Avalos 2 days ago.  He states that  after his visit, noted he stopped draining and started having RUQ pain. He states that it causes him discomfort to the point that it hurts to lay down.      PAST MEDICAL & SURGICAL HISTORY  No pertinent past medical history  No significant past surgical history      FAMILY HISTORY:  FAMILY HISTORY:  FH: aortic dissection      SOCIAL HISTORY:  []smoker  []Alcohol  []Drug    ALLERGIES:  No Known Allergies      MEDICATIONS:  MEDICATIONS  (STANDING):  morphine  - Injectable 4 milliGRAM(s) IV Push Once    MEDICATIONS  (PRN):      HOME MEDICATIONS:  Home Medications:      VITALS:   T(F): 97.4 (03-11 @ 15:49), Max: 98 (03-11 @ 11:32)  HR: 70 (03-11 @ 15:49) (70 - 77)  BP: 116/60 (03-11 @ 15:49) (116/60 - 137/78)  BP(mean): 81 (03-11 @ 15:49) (81 - 81)  RR: 18 (03-11 @ 15:49) (18 - 18)  SpO2: 99% (03-11 @ 15:49) (99% - 99%)    I&O's Summary      REVIEW OF SYSTEMS:  CONSTITUTIONAL: No weakness, fevers or chills  EYES: No visual changes  ENT: No vertigo or throat pain   NECK: No pain or stiffness  RESPIRATORY: No cough, wheezing, hemoptysis; No shortness of breath  CARDIOVASCULAR: No chest pain or palpitations  GASTROINTESTINAL: No abdominal or epigastric pain. No nausea, vomiting, or hematemesis; No diarrhea or constipation. No melena or hematochezia.  GENITOURINARY: No dysuria, frequency or hematuria  NEUROLOGICAL: No numbness or weakness  SKIN: No itching, no rashes  MSK: no    PHYSICAL EXAM:  NEURO: patient is awake , alert and oriented  GEN: Not in acute distress  NECK: no thyroid enlargement, no JVD  LUNGS: Clear to auscultation bilaterally   CARDIOVASCULAR: S1/S2 present, RRR , no murmurs or rubs, no carotid bruits,  + PP bilaterally  ABD: Soft, non-tender, non-distended, +BS, perc ashlie in place, not draining.   EXT: No TRINO  SKIN: Intact    LABS:                        14.5   6.37  )-----------( 248      ( 11 Mar 2020 12:37 )             43.2     03-11    135  |  99  |  8<L>  ----------------------------<  121<H>  4.7   |  24  |  0.7    Ca    9.9      11 Mar 2020 12:37    TPro  7.6  /  Alb  4.7  /  TBili  2.3<H>  /  DBili  x   /  AST  28  /  ALT  46<H>  /  AlkPhos  113  03-11    PT/INR - ( 11 Mar 2020 12:37 )   PT: 14.20 sec;   INR: 1.23 ratio         PTT - ( 11 Mar 2020 12:37 )  PTT:36.2 sec  Lactate, Blood: 1.3 mmol/L (03-11-20 @ 12:37)      Troponin trend:        RADIOLOGY:  -CXR:  -TTE:      2-D ECHO (TTE) COMPLETE        PROCEDURE DATE:  09/25/2019      Summary:   1. Left ventricular ejection fraction, by visual estimation, is 60 to   65%.   2. Spectral Doppler shows impaired relaxation pattern of left   ventricular myocardial filling (Grade I diastolic dysfunction).   3. Mild mitral valve regurgitation.    -CCTA: None       -STRESS TEST: None     -CATHETERIZATION:   PCI of mid Cx 09/25/19       ECG:      TELEMETRY EVENTS:

## 2020-03-12 ENCOUNTER — TRANSCRIPTION ENCOUNTER (OUTPATIENT)
Age: 60
End: 2020-03-12

## 2020-03-12 VITALS
TEMPERATURE: 96 F | RESPIRATION RATE: 18 BRPM | DIASTOLIC BLOOD PRESSURE: 76 MMHG | HEART RATE: 69 BPM | SYSTOLIC BLOOD PRESSURE: 112 MMHG

## 2020-03-12 LAB
ALBUMIN SERPL ELPH-MCNC: 3.8 G/DL — SIGNIFICANT CHANGE UP (ref 3.5–5.2)
ALP SERPL-CCNC: 96 U/L — SIGNIFICANT CHANGE UP (ref 30–115)
ALT FLD-CCNC: 31 U/L — SIGNIFICANT CHANGE UP (ref 0–41)
ANION GAP SERPL CALC-SCNC: 10 MMOL/L — SIGNIFICANT CHANGE UP (ref 7–14)
AST SERPL-CCNC: 18 U/L — SIGNIFICANT CHANGE UP (ref 0–41)
BASOPHILS # BLD AUTO: 0.01 K/UL — SIGNIFICANT CHANGE UP (ref 0–0.2)
BASOPHILS NFR BLD AUTO: 0.2 % — SIGNIFICANT CHANGE UP (ref 0–1)
BILIRUB DIRECT SERPL-MCNC: 0.2 MG/DL — SIGNIFICANT CHANGE UP (ref 0–0.2)
BILIRUB INDIRECT FLD-MCNC: 1.9 MG/DL — HIGH (ref 0.2–1.2)
BILIRUB SERPL-MCNC: 2.1 MG/DL — HIGH (ref 0.2–1.2)
BUN SERPL-MCNC: 7 MG/DL — LOW (ref 10–20)
CALCIUM SERPL-MCNC: 9.2 MG/DL — SIGNIFICANT CHANGE UP (ref 8.5–10.1)
CHLORIDE SERPL-SCNC: 102 MMOL/L — SIGNIFICANT CHANGE UP (ref 98–110)
CO2 SERPL-SCNC: 27 MMOL/L — SIGNIFICANT CHANGE UP (ref 17–32)
CREAT SERPL-MCNC: 0.6 MG/DL — LOW (ref 0.7–1.5)
EOSINOPHIL # BLD AUTO: 0.06 K/UL — SIGNIFICANT CHANGE UP (ref 0–0.7)
EOSINOPHIL NFR BLD AUTO: 1.2 % — SIGNIFICANT CHANGE UP (ref 0–8)
GLUCOSE SERPL-MCNC: 101 MG/DL — HIGH (ref 70–99)
HCT VFR BLD CALC: 37.1 % — LOW (ref 42–52)
HGB BLD-MCNC: 12.8 G/DL — LOW (ref 14–18)
IMM GRANULOCYTES NFR BLD AUTO: 0 % — LOW (ref 0.1–0.3)
LYMPHOCYTES # BLD AUTO: 1.91 K/UL — SIGNIFICANT CHANGE UP (ref 1.2–3.4)
LYMPHOCYTES # BLD AUTO: 37.5 % — SIGNIFICANT CHANGE UP (ref 20.5–51.1)
MAGNESIUM SERPL-MCNC: 2.1 MG/DL — SIGNIFICANT CHANGE UP (ref 1.8–2.4)
MCHC RBC-ENTMCNC: 30.3 PG — SIGNIFICANT CHANGE UP (ref 27–31)
MCHC RBC-ENTMCNC: 34.5 G/DL — SIGNIFICANT CHANGE UP (ref 32–37)
MCV RBC AUTO: 87.7 FL — SIGNIFICANT CHANGE UP (ref 80–94)
MONOCYTES # BLD AUTO: 0.56 K/UL — SIGNIFICANT CHANGE UP (ref 0.1–0.6)
MONOCYTES NFR BLD AUTO: 11 % — HIGH (ref 1.7–9.3)
NEUTROPHILS # BLD AUTO: 2.55 K/UL — SIGNIFICANT CHANGE UP (ref 1.4–6.5)
NEUTROPHILS NFR BLD AUTO: 50.1 % — SIGNIFICANT CHANGE UP (ref 42.2–75.2)
NRBC # BLD: 0 /100 WBCS — SIGNIFICANT CHANGE UP (ref 0–0)
PHOSPHATE SERPL-MCNC: 3.7 MG/DL — SIGNIFICANT CHANGE UP (ref 2.1–4.9)
PLATELET # BLD AUTO: 203 K/UL — SIGNIFICANT CHANGE UP (ref 130–400)
POTASSIUM SERPL-MCNC: 4.2 MMOL/L — SIGNIFICANT CHANGE UP (ref 3.5–5)
POTASSIUM SERPL-SCNC: 4.2 MMOL/L — SIGNIFICANT CHANGE UP (ref 3.5–5)
PROT SERPL-MCNC: 6.2 G/DL — SIGNIFICANT CHANGE UP (ref 6–8)
RBC # BLD: 4.23 M/UL — LOW (ref 4.7–6.1)
RBC # FLD: 12.9 % — SIGNIFICANT CHANGE UP (ref 11.5–14.5)
SODIUM SERPL-SCNC: 139 MMOL/L — SIGNIFICANT CHANGE UP (ref 135–146)
WBC # BLD: 5.09 K/UL — SIGNIFICANT CHANGE UP (ref 4.8–10.8)
WBC # FLD AUTO: 5.09 K/UL — SIGNIFICANT CHANGE UP (ref 4.8–10.8)

## 2020-03-12 PROCEDURE — 99231 SBSQ HOSP IP/OBS SF/LOW 25: CPT

## 2020-03-12 PROCEDURE — 47536 EXCHANGE BILIARY DRG CATH: CPT

## 2020-03-12 RX ORDER — ACETAMINOPHEN 500 MG
2 TABLET ORAL
Qty: 0 | Refills: 0 | DISCHARGE
Start: 2020-03-12

## 2020-03-12 RX ADMIN — Medication 1 TABLET(S): at 17:06

## 2020-03-12 RX ADMIN — TICAGRELOR 90 MILLIGRAM(S): 90 TABLET ORAL at 06:06

## 2020-03-12 RX ADMIN — Medication 600 MILLIGRAM(S): at 05:28

## 2020-03-12 RX ADMIN — Medication 650 MILLIGRAM(S): at 05:29

## 2020-03-12 RX ADMIN — Medication 1 TABLET(S): at 05:29

## 2020-03-12 RX ADMIN — Medication 25 MILLIGRAM(S): at 05:28

## 2020-03-12 RX ADMIN — Medication 650 MILLIGRAM(S): at 17:06

## 2020-03-12 RX ADMIN — Medication 650 MILLIGRAM(S): at 00:10

## 2020-03-12 RX ADMIN — TICAGRELOR 90 MILLIGRAM(S): 90 TABLET ORAL at 17:06

## 2020-03-12 RX ADMIN — Medication 650 MILLIGRAM(S): at 17:54

## 2020-03-12 NOTE — DISCHARGE NOTE NURSING/CASE MANAGEMENT/SOCIAL WORK - PATIENT PORTAL LINK FT
You can access the FollowMyHealth Patient Portal offered by [facility] by registering at the following website: [email or web address]. By joining [employer]’s FollowMyHealth portal, you will also be able to view your health information using other applications (apps) compatible with our system.

## 2020-03-12 NOTE — PROGRESS NOTE ADULT - SUBJECTIVE AND OBJECTIVE BOX
INTERVENTIONAL RADIOLOGY BRIEF-OPERATIVE NOTE    Procedure:  Fluoro-guided evaluation and exchange of cholecystostomy catheter    Pre-Op Diagnosis:  Cholecystostomy catheter dysfunction    Post-Op Diagnosis:  Same    Attending:  Dr. Red  Resident:   None    Anesthesia (type):  [ ] General Anesthesia  [X] Sedation-- Versed, 4mg; Fentanyl, 100mcg; Dilaudid, 2mg iv (in divided doses)  [ ] Spinal Anesthesia  [X] Local/Regional-- 1% Lidocaine, SQ, RUQ, 7cc    Total Face-to-Face Sedation Time:  24 minutes    Contrast:   Visipaque-320, 20cc through cholecystostomy catheter, largely drained subsequently    Estimated Blood Loss:  1cc    Condition:   [ ] Critical  [ ] Serious  [X] Fair   [ ] Good    Findings/Follow up Plan of Care:  Original catheter takes a circuitous course before terminating in the gallbladder; note, at least one of the catheter fenestrations is outside the gallbladder, necessitating exchange.  Patient was extremely anxious throughout the procedure, requiring additional sedoanalgesia.  New 8-Icelandic pigtail catheter placed via a guidewire uneventfully.  Cholecystography showed filling of gallbladder;  cystic duct not shown to be patent.  New catheter connected to external drainage.  Findings d/w Dr. Avalos at 2:15pm, with read-back.    Specimens Removed:  None    Implants:  None    Complications:  None immediate    Disposition:  for F/u with Dr. Avalos.      Please call Interventional Radiology [phone] with any questions, concerns, or issues.

## 2020-03-12 NOTE — DISCHARGE NOTE PROVIDER - HOSPITAL COURSE
59m w/ a PMH of kidney stones, CAD s/p PCI 5.5 months ago (cardiologist dr. Chavira) on ASA & brillenta, and s/p percutaneous cholecystostomy 2/19/20 at [facility] in [place] for cholecystitis (+HIDA), followed by Dr. Avalos outpt, seen most recently 3/9/20 in the office w/ no pain, tenderness, or complications, drain making 300 cc bile/day. shortly thereafter, drain output decreased to 0. Pt presents today with increased RUQ pain and headache. Pt denies CP, SOB, n/v/d, fever, chills, urinary symptoms.    Cholecystostomy drain was exchanged for new drain in IR today. Bilious output. Patient discharged home with follow up with Dr. Avalos in the office. 59m w/ a PMH of kidney stones, CAD s/p PCI 5.5 months ago (cardiologist dr. Chavira) on ASA & brillenta, and s/p percutaneous cholecystostomy 2/19/20 at [facility] in [place] for cholecystitis (+HIDA), followed by Dr. Avalos outpt, seen most recently 3/9/20 in the office w/ no pain, tenderness, or complications, drain making 300 cc bile/day. shortly thereafter, drain output decreased to 0. Pt presents today with increased RUQ pain and headache. Pt denies CP, SOB, n/v/d, fever, chills, urinary symptoms.    Cholecystostomy drain was exchanged for new drain in IR today. Bilious output. Patient discharged home with follow up with Dr. Avalos in the office.            Procedure d/w Dr Red of IR, and with son and daughter via phone this pm.    Pt cleared for d/c to home after recovery from meds given for procedure, and pt will f/u via office as already scheduled.    Case also d/e Dr. Chavira, and pt can be scheduled for elective cholecystectomy at the appropriate interval, with pre-op conversion of antiplatelet Rx to IV meds     over 48 hrs pre-op as inpt.    All questions answered.

## 2020-03-12 NOTE — DISCHARGE NOTE PROVIDER - NSDCMRMEDTOKEN_GEN_ALL_CORE_FT
acetaminophen 325 mg oral tablet: 2 tab(s) orally every 6 hours  amoxicillin-clavulanate 500 mg-125 mg oral tablet: 1 tab(s) orally every 12 hours  Aspirin Enteric Coated 81 mg oral delayed release tablet: 1 tab(s) orally once a day MDD:1  atorvastatin 80 mg oral tablet: 1 tab(s) orally once a day (at bedtime)  Brilinta (ticagrelor) 90 mg oral tablet: 1 tab(s) orally 2 times a day MDD:2  metoprolol succinate 25 mg oral tablet, extended release: 1 tab(s) orally once a day

## 2020-03-12 NOTE — DISCHARGE NOTE PROVIDER - NSDCCPCAREPLAN_GEN_ALL_CORE_FT
PRINCIPAL DISCHARGE DIAGNOSIS  Diagnosis: Cholecystostomy tube dysfunction  Assessment and Plan of Treatment:

## 2020-03-12 NOTE — PROVIDER CONTACT NOTE (OTHER) - SITUATION
RN notified Md of pts refusal to receive AM heparin dose and pt requested Brilinta instead which is marked as not done. MD stated to admin Brilinta following rounds and to hold heparin

## 2020-03-12 NOTE — PROGRESS NOTE ADULT - SUBJECTIVE AND OBJECTIVE BOX
GENERAL SURGERY PROGRESS NOTE     VICKY SPENCER  59y  Male  Hospital day :1d  POD:  Procedure:   OVERNIGHT EVENTS: no acute issues overnight, planned for IR per ashlie drain exchange today    T(F): 96.3 (03-12-20 @ 04:00), Max: 98 (03-11-20 @ 11:32)  HR: 66 (03-12-20 @ 05:22) (66 - 83)  BP: 114/69 (03-12-20 @ 05:22) (114/69 - 137/78)  RR: 18 (03-12-20 @ 04:00) (18 - 18)  SpO2: 97% (03-11-20 @ 18:39) (97% - 99%)    DIET/FLUIDS: lactated ringers. 1000 milliLiter(s) IV Continuous <Continuous>    GI proph:  pantoprazole  Injectable 40 milliGRAM(s) IV Push daily    AC/ proph: aspirin enteric coated 81 milliGRAM(s) Oral daily  heparin  Injectable 5000 Unit(s) SubCutaneous every 8 hours    ABx: amoxicillin  500 milliGRAM(s)/clavulanate 1 Tablet(s) Oral every 12 hours      PHYSICAL EXAM:  GENERAL: NAD, well-appearing  CHEST/LUNG: Clear to auscultation bilaterally  HEART: Regular rate and rhythm  ABDOMEN: Soft, epigastrium and RUQ tender, Nondistended, + em sign, drain site c/d/i, able to flush, not draw back. No pain with flushing       LABS  Labs:  CAPILLARY BLOOD GLUCOSE      POCT Blood Glucose.: 121 mg/dL (11 Mar 2020 13:41)                          12.8   5.09  )-----------( 203      ( 12 Mar 2020 01:09 )             37.1       Auto Neutrophil %: 50.1 % (03-12-20 @ 01:09)  Auto Immature Granulocyte %: 0.0 % (03-12-20 @ 01:09)  Auto Neutrophil %: 74.6 % (03-11-20 @ 12:37)  Auto Immature Granulocyte %: 0.3 % (03-11-20 @ 12:37)    03-12    139  |  102  |  7<L>  ----------------------------<  101<H>  4.2   |  27  |  0.6<L>      Calcium, Total Serum: 9.2 mg/dL (03-12-20 @ 01:09)      LFTs:             6.2  | 2.1  | 18       ------------------[96      ( 12 Mar 2020 01:09 )  3.8  | 0.2  | 31          Lipase:x      Amylase:x         Lactate, Blood: 1.3 mmol/L (03-11-20 @ 12:37)      Coags:     14.20  ----< 1.23    ( 11 Mar 2020 12:37 )     36.2          Urinalysis Basic - ( 11 Mar 2020 19:55 )    Color: Light Yellow / Appearance: Clear / SG: >1.050 / pH: x  Gluc: x / Ketone: Small  / Bili: Negative / Urobili: <2 mg/dL   Blood: x / Protein: Trace / Nitrite: Negative   Leuk Esterase: Negative / RBC: x / WBC x   Sq Epi: x / Non Sq Epi: x / Bacteria: x

## 2020-03-12 NOTE — DISCHARGE NOTE PROVIDER - CARE PROVIDER_API CALL
Nadir Avalos)  Surgery  [address] [place] [address], [address] [place] [address]  Phone: [phone]  Fax: [phone]  Follow Up Time: 2 weeks

## 2020-03-12 NOTE — PROGRESS NOTE ADULT - SUBJECTIVE AND OBJECTIVE BOX
Vascular & Interventional Radiology Pre-Procedure Note    Procedure Name: Percutaneous cholecystostomy exchange    HPI: HPI:  HPI:  59m w/ a PMH of kidney stones, CAD s/p PCI 5.5 months ago (cardiologist dr. Chavira) on ASA & brillenta, and s/p percutaneous cholecystostomy 2/19/20 at [facility] in [place] for cholecystitis (+HIDA), followed by Dr. Avalos outpt, seen most recently 3/9/20 in the office w/ no pain, tenderness, or complications, drain making 300 cc bile/day. shortly thereafter, drain output decreased to 0. Pt presents today with increased RUQ pain and headache. Pt denies CP, SOB, n/v/d, fever, chills, urinary symptoms. (11 Mar 2020 17:07)      Allergies:   Medications (Abx/Cardiac/Anticoagulation/Blood Products)    amoxicillin  500 milliGRAM(s)/clavulanate: 1 Tablet(s) Oral (03-12 @ 05:29)  aspirin enteric coated: 81 milliGRAM(s) Oral (03-11 @ 17:38)  heparin  Injectable: 5000 Unit(s) SubCutaneous (03-11 @ 21:25)  metoprolol succinate ER: 25 milliGRAM(s) Oral (03-12 @ 05:28)  ticagrelor: 90 milliGRAM(s) Oral (03-12 @ 06:06)    Data:  185.4  90.718  T(C): 35.7  HR: 70  BP: 135/76  RR: 18  SpO2: 97%    Exam  General: NAD, AAO x3, no distress  Chest: breathing comfortably on room air, CTAB  Abdomen: soft, non-tender, non- distended   Extremities: positive pulses bilaterally x4    Radiology & Additional Studies: Radiology imaging reviewed.     Labs:   -WBC 5.09 / HgB 12.8 / Hct 37.1 / Plt 203  -Na 139 / Cl 102 / BUN 7 / Glucose 101  -K 4.2 / CO2 27 / Cr 0.6  -ALT 31 / Alk Phos 96 / T.Bili 2.1  -INR1.23      EKG completed (date):     Height/Weight: Daily Height in cm: 185.42 (11 Mar 2020 20:20)    Daily     Consentable: [ ] Yes   [ ] No     Plan:   - 59y Male presents for poor output from perc ashlie placed at OSH last month (2/2020)  - Patient remains stable  - CT images were reviewed and plan for perc ashlie exchange  - Continue NPO status  - Risks/Benefits/alternatives explained with the patient and/or healthcare proxy and witnessed informed consent obtained. Vascular & Interventional Radiology Pre-Procedure Note    Procedure Name: Percutaneous cholecystostomy exchange    HPI: HPI:  HPI:  59m w/ a PMH of kidney stones, CAD s/p PCI 5.5 months ago (cardiologist dr. Chavira) on ASA & brillenta, and s/p percutaneous cholecystostomy 2/19/20 at [facility] in [place] for cholecystitis (+HIDA), followed by Dr. Avalos outpt, seen most recently 3/9/20 in the office w/ no pain, tenderness, or complications, drain making 300 cc bile/day. shortly thereafter, drain output decreased to 0. Pt presents today with increased RUQ pain and headache. Pt denies CP, SOB, n/v/d, fever, chills, urinary symptoms. (11 Mar 2020 17:07)      Allergies:   Medications (Abx/Cardiac/Anticoagulation/Blood Products)    amoxicillin  500 milliGRAM(s)/clavulanate: 1 Tablet(s) Oral (03-12 @ 05:29)  aspirin enteric coated: 81 milliGRAM(s) Oral (03-11 @ 17:38)  heparin  Injectable: 5000 Unit(s) SubCutaneous (03-11 @ 21:25)  metoprolol succinate ER: 25 milliGRAM(s) Oral (03-12 @ 05:28)  ticagrelor: 90 milliGRAM(s) Oral (03-12 @ 06:06)    Data:  185.4  90.718  T(C): 35.7  HR: 70  BP: 135/76  RR: 18  SpO2: 97%    Exam  General: NAD, AAO x3, no distress  Chest: breathing comfortably on room air, CTAB  Abdomen: soft, non-tender, non- distended   Extremities: positive pulses bilaterally x4    Radiology & Additional Studies: Radiology imaging reviewed.     Labs:   -WBC 5.09 / HgB 12.8 / Hct 37.1 / Plt 203  -Na 139 / Cl 102 / BUN 7 / Glucose 101  -K 4.2 / CO2 27 / Cr 0.6  -ALT 31 / Alk Phos 96 / T.Bili 2.1  -INR1.23      Height/Weight: Daily Height in cm: 185.42 (11 Mar 2020 20:20)    Daily         Plan:   - 59y Male presents for poor output from perc ashlie placed at OSH last month (2/2020)  - Patient remains stable  - CT images were reviewed and plan for perc ashlie exchange  - Continue NPO status  - Risks/Benefits/alternatives explained with the patient and/or healthcare proxy and witnessed informed consent obtained.

## 2020-03-12 NOTE — PROGRESS NOTE ADULT - ASSESSMENT
ASSESSMENT:  59M w/ perc ashlie tube malfxn    PLAN:  IR planning on tube exchange today  Following up Dr. Mcclure re: clearance to stop brillinta  NPO, IVF, pain control

## 2020-03-12 NOTE — PROGRESS NOTE ADULT - ATTENDING COMMENTS
Patient seen and examined at bedside at only 15 hours. He is alert and stable and complains only minimal right upper quadrant pain. He has had no nausea vomiting and feels well otherwise. He did not use any analgesics overnight and is on Augmentin for his recent dental surgery.  Stable and afebrile. Swelling in left mandibular area is much less.  Abdomen is soft and not distended, no further drain output at the exit site is clean and not tender. Essentially no right upper quadrant tenderness on this exam.  Today's labs noted, WBC is normal, bilirubin down to 2.1, liver function tests now within normal limits. Abdominal CT and sonogram results reviewed with the images on line.  Cardiology evaluation noted and appreciated.  Awaiting drain investigation and replacement by interventional radiology.    If all goes well in that regard,  anticipate that the patient will be discharged home later today and follow up in my office as scheduled. No need for further biliary imaging as originally planned, unless other changes. I will also discuss the management of his antiplatelet therapy in the timing of the elective interval cholecystectomy with Dr. Chavira.  All questions were answered and he is happy with the assessment and plan.

## 2020-03-13 ENCOUNTER — TRANSCRIPTION ENCOUNTER (OUTPATIENT)
Age: 60
End: 2020-03-13

## 2020-03-16 LAB
CULTURE RESULTS: SIGNIFICANT CHANGE UP
CULTURE RESULTS: SIGNIFICANT CHANGE UP
SPECIMEN SOURCE: SIGNIFICANT CHANGE UP
SPECIMEN SOURCE: SIGNIFICANT CHANGE UP

## 2020-03-17 DIAGNOSIS — T85.510A BREAKDOWN (MECHANICAL) OF BILE DUCT PROSTHESIS, INITIAL ENCOUNTER: ICD-10-CM

## 2020-03-17 DIAGNOSIS — Z87.442 PERSONAL HISTORY OF URINARY CALCULI: ICD-10-CM

## 2020-03-17 DIAGNOSIS — R51 HEADACHE: ICD-10-CM

## 2020-03-17 DIAGNOSIS — I25.10 ATHEROSCLEROTIC HEART DISEASE OF NATIVE CORONARY ARTERY WITHOUT ANGINA PECTORIS: ICD-10-CM

## 2020-03-17 DIAGNOSIS — Z95.5 PRESENCE OF CORONARY ANGIOPLASTY IMPLANT AND GRAFT: ICD-10-CM
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
## 2020-04-06 ENCOUNTER — APPOINTMENT (OUTPATIENT)
Dept: SURGERY | Facility: CLINIC | Age: 60
End: 2020-04-06
Payer: COMMERCIAL

## 2020-04-06 VITALS
WEIGHT: 206 LBS | TEMPERATURE: 97.9 F | BODY MASS INDEX: 27.3 KG/M2 | SYSTOLIC BLOOD PRESSURE: 134 MMHG | HEART RATE: 78 BPM | HEIGHT: 73 IN | DIASTOLIC BLOOD PRESSURE: 82 MMHG

## 2020-04-06 PROCEDURE — 99213 OFFICE O/P EST LOW 20 MIN: CPT

## 2020-04-06 NOTE — HISTORY OF PRESENT ILLNESS
[de-identified] : The patient returns for reevaluation regarding the management of his gallbladder disease. Shortly after the last office visit, his cholecystostomy tube malfunctioned and he was admitted for evaluation and replacement of the tube, which has been functioning well since, with the patient being asymptomatic. He has no abdominal pain, a normal appetite, and normal bowel and bladder function. His stools are not acholic and he only complains of some local discomfort at the exit site. He does report daily output of between 50 and 200 cc per day, usually at the higher end.

## 2020-04-06 NOTE — DATA REVIEWED
[FreeTextEntry1] : Records reviewed from recent [facility] admission as noted above.

## 2020-04-06 NOTE — ASSESSMENT
[FreeTextEntry1] : The patient is asymptomatic and the percutaneous cholecystostomy tube is functioning well. The increased output may indicate a partial CBD obstruction and an MRCP was requested for further evaluation. He is now about 7 weeks status post the tube insertion and if no further workup is needed after the MRCP, we will coordinate the laparoscopic cholecystectomy with possible open exploration with the cardiologist, and based on OR availability in light of the corona virus situation.  The patient will require admission 2 days before surgery for conversion of his antiplatelet therapy to IV anticoagulation as per Dr. Chavira.  All his questions were answered and he understands and agrees.

## 2020-04-06 NOTE — PHYSICAL EXAM
[Abdominal Masses] : No abdominal masses [Abdomen Tenderness] : ~T ~M No abdominal tenderness [No HSM] : no hepatosplenomegaly [de-identified] : healthy and anicteric [de-identified] : soft and not distended, drain exit site looks clean, drainage is clear bilious.

## 2020-04-15 PROBLEM — I10 ESSENTIAL (PRIMARY) HYPERTENSION: Chronic | Status: ACTIVE | Noted: 2020-03-11

## 2020-04-16 ENCOUNTER — RESULT REVIEW (OUTPATIENT)
Age: 60
End: 2020-04-16

## 2020-04-16 ENCOUNTER — OUTPATIENT (OUTPATIENT)
Dept: OUTPATIENT SERVICES | Facility: HOSPITAL | Age: 60
LOS: 1 days | Discharge: HOME | End: 2020-04-16
Payer: COMMERCIAL

## 2020-04-16 DIAGNOSIS — R10.9 UNSPECIFIED ABDOMINAL PAIN: ICD-10-CM

## 2020-04-16 PROCEDURE — 74181 MRI ABDOMEN W/O CONTRAST: CPT | Mod: 26

## 2020-04-18 ENCOUNTER — NON-APPOINTMENT (OUTPATIENT)
Age: 60
End: 2020-04-18

## 2020-05-13 ENCOUNTER — APPOINTMENT (OUTPATIENT)
Dept: SURGERY | Facility: CLINIC | Age: 60
End: 2020-05-13
Payer: COMMERCIAL

## 2020-05-13 VITALS
TEMPERATURE: 97.7 F | DIASTOLIC BLOOD PRESSURE: 87 MMHG | HEART RATE: 77 BPM | SYSTOLIC BLOOD PRESSURE: 120 MMHG | BODY MASS INDEX: 26.9 KG/M2 | HEIGHT: 73 IN | WEIGHT: 203 LBS

## 2020-05-13 PROCEDURE — 99213 OFFICE O/P EST LOW 20 MIN: CPT

## 2020-05-13 NOTE — HISTORY OF PRESENT ILLNESS
[de-identified] : The patient returns with his wife for evaluation of his drain site as a having some difficulty with the dressings. He has no new symptoms regarding his gallbladder, he remains free of abdominal pain or diarrhea and the drain output is unchanged from previous. He reports normal urine and stool color.

## 2020-05-13 NOTE — PHYSICAL EXAM
[Abdominal Masses] : No abdominal masses [Abdomen Tenderness] : ~T ~M No abdominal tenderness [No HSM] : no hepatosplenomegaly [de-identified] : healthy and anicteric [de-identified] : Soft and not distended, gallbladder not palpable, Lim sign negative. Drain exit site in the right upper quadrant is clean and dry but the dressing and the suture were loose. These were both removed, and under aseptic technique with the drain was resecured with a new 2-0 silk suture. A dry dressing was applied and the patient tolerated this well

## 2020-05-13 NOTE — ASSESSMENT
[FreeTextEntry1] : The patient continues to do well. Local care instructions were reviewed and all their questions were answered. We are in contact with his cardiologist and once the perioperative management of his anticoagulation has been outlined and will arrange for the surgery to be scheduled as an inpatient and as previously discussed. No home care nursing is needed at this time.

## 2020-05-21 ENCOUNTER — OUTPATIENT (OUTPATIENT)
Dept: OUTPATIENT SERVICES | Facility: HOSPITAL | Age: 60
LOS: 1 days | Discharge: HOME | End: 2020-05-21

## 2020-05-21 VITALS
DIASTOLIC BLOOD PRESSURE: 64 MMHG | OXYGEN SATURATION: 100 % | HEART RATE: 77 BPM | WEIGHT: 203.05 LBS | TEMPERATURE: 97 F | SYSTOLIC BLOOD PRESSURE: 132 MMHG | HEIGHT: 73 IN | RESPIRATION RATE: 18 BRPM

## 2020-05-21 DIAGNOSIS — Z01.818 ENCOUNTER FOR OTHER PREPROCEDURAL EXAMINATION: ICD-10-CM

## 2020-05-21 DIAGNOSIS — K81.0 ACUTE CHOLECYSTITIS: ICD-10-CM

## 2020-05-21 NOTE — H&P PST ADULT - REASON FOR ADMISSION
laparoscopic cholecystectomy possible open  s/p percutaneous cholecystostomy 2/19/20, with drain exchange done in ir 3/2020 laparoscopic cholecystectomy possible open due to gall stones  s/p percutaneous cholecystostomy 2/19/20, with drain exchange done in ir 3/2020

## 2020-05-21 NOTE — H&P PST ADULT - HISTORY OF PRESENT ILLNESS
PATIENT CURRENTLY DENIES CHEST PAIN  SHORTNESS OF BREATH  PALPITATIONS,  DYSURIA, OR UPPER RESPIRATORY INFECTION IN PAST 2 WEEKS  EXERCISE  TOLERANCE  1-2 FLIGHT OF STAIRS  WITHOUT SHORTNESS OF BREATH  denies travel outside the [place] in the past 30 days  pt denies any covid s/s, or tested positive in the past 2 weeks     pt to be admitted 5/31/2020 to be put iv anticoagulants

## 2020-05-26 PROBLEM — I25.10 ATHEROSCLEROTIC HEART DISEASE OF NATIVE CORONARY ARTERY WITHOUT ANGINA PECTORIS: Chronic | Status: ACTIVE | Noted: 2020-03-11

## 2020-05-27 ENCOUNTER — OUTPATIENT (OUTPATIENT)
Dept: OUTPATIENT SERVICES | Facility: HOSPITAL | Age: 60
LOS: 1 days | Discharge: HOME | End: 2020-05-27
Payer: COMMERCIAL

## 2020-05-27 DIAGNOSIS — R91.8 OTHER NONSPECIFIC ABNORMAL FINDING OF LUNG FIELD: ICD-10-CM

## 2020-05-27 PROCEDURE — 71250 CT THORAX DX C-: CPT | Mod: 26

## 2020-05-31 ENCOUNTER — INPATIENT (INPATIENT)
Facility: HOSPITAL | Age: 60
LOS: 2 days | Discharge: HOME | End: 2020-06-03
Attending: SURGERY | Admitting: SURGERY
Payer: COMMERCIAL

## 2020-05-31 VITALS
RESPIRATION RATE: 18 BRPM | WEIGHT: 203.05 LBS | DIASTOLIC BLOOD PRESSURE: 67 MMHG | SYSTOLIC BLOOD PRESSURE: 107 MMHG | HEART RATE: 88 BPM | TEMPERATURE: 97 F | HEIGHT: 73 IN

## 2020-05-31 LAB
ALBUMIN SERPL ELPH-MCNC: 3.8 G/DL — SIGNIFICANT CHANGE UP (ref 3.5–5.2)
ALP SERPL-CCNC: 103 U/L — SIGNIFICANT CHANGE UP (ref 30–115)
ALT FLD-CCNC: 29 U/L — SIGNIFICANT CHANGE UP (ref 0–41)
ANION GAP SERPL CALC-SCNC: 9 MMOL/L — SIGNIFICANT CHANGE UP (ref 7–14)
APPEARANCE UR: CLEAR — SIGNIFICANT CHANGE UP
APTT BLD: 38.4 SEC — SIGNIFICANT CHANGE UP (ref 27–39.2)
AST SERPL-CCNC: 28 U/L — SIGNIFICANT CHANGE UP (ref 0–41)
BASOPHILS # BLD AUTO: 0.02 K/UL — SIGNIFICANT CHANGE UP (ref 0–0.2)
BASOPHILS NFR BLD AUTO: 0.4 % — SIGNIFICANT CHANGE UP (ref 0–1)
BILIRUB SERPL-MCNC: 1.4 MG/DL — HIGH (ref 0.2–1.2)
BILIRUB UR-MCNC: NEGATIVE — SIGNIFICANT CHANGE UP
BLD GP AB SCN SERPL QL: SIGNIFICANT CHANGE UP
BUN SERPL-MCNC: 16 MG/DL — SIGNIFICANT CHANGE UP (ref 10–20)
CALCIUM SERPL-MCNC: 8.5 MG/DL — SIGNIFICANT CHANGE UP (ref 8.5–10.1)
CHLORIDE SERPL-SCNC: 100 MMOL/L — SIGNIFICANT CHANGE UP (ref 98–110)
CO2 SERPL-SCNC: 25 MMOL/L — SIGNIFICANT CHANGE UP (ref 17–32)
COLOR SPEC: SIGNIFICANT CHANGE UP
CREAT SERPL-MCNC: 0.8 MG/DL — SIGNIFICANT CHANGE UP (ref 0.7–1.5)
DIFF PNL FLD: NEGATIVE — SIGNIFICANT CHANGE UP
EOSINOPHIL # BLD AUTO: 0.09 K/UL — SIGNIFICANT CHANGE UP (ref 0–0.7)
EOSINOPHIL NFR BLD AUTO: 1.9 % — SIGNIFICANT CHANGE UP (ref 0–8)
GLUCOSE SERPL-MCNC: 80 MG/DL — SIGNIFICANT CHANGE UP (ref 70–99)
GLUCOSE UR QL: NEGATIVE — SIGNIFICANT CHANGE UP
HCT VFR BLD CALC: 39.2 % — LOW (ref 42–52)
HGB BLD-MCNC: 13.1 G/DL — LOW (ref 14–18)
IMM GRANULOCYTES NFR BLD AUTO: 0 % — LOW (ref 0.1–0.3)
INR BLD: 1.39 RATIO — HIGH (ref 0.65–1.3)
KETONES UR-MCNC: NEGATIVE — SIGNIFICANT CHANGE UP
LEUKOCYTE ESTERASE UR-ACNC: NEGATIVE — SIGNIFICANT CHANGE UP
LYMPHOCYTES # BLD AUTO: 2.29 K/UL — SIGNIFICANT CHANGE UP (ref 1.2–3.4)
LYMPHOCYTES # BLD AUTO: 49 % — SIGNIFICANT CHANGE UP (ref 20.5–51.1)
MAGNESIUM SERPL-MCNC: 2.1 MG/DL — SIGNIFICANT CHANGE UP (ref 1.8–2.4)
MCHC RBC-ENTMCNC: 30.1 PG — SIGNIFICANT CHANGE UP (ref 27–31)
MCHC RBC-ENTMCNC: 33.4 G/DL — SIGNIFICANT CHANGE UP (ref 32–37)
MCV RBC AUTO: 90.1 FL — SIGNIFICANT CHANGE UP (ref 80–94)
MONOCYTES # BLD AUTO: 0.6 K/UL — SIGNIFICANT CHANGE UP (ref 0.1–0.6)
MONOCYTES NFR BLD AUTO: 12.8 % — HIGH (ref 1.7–9.3)
NEUTROPHILS # BLD AUTO: 1.67 K/UL — SIGNIFICANT CHANGE UP (ref 1.4–6.5)
NEUTROPHILS NFR BLD AUTO: 35.9 % — LOW (ref 42.2–75.2)
NITRITE UR-MCNC: NEGATIVE — SIGNIFICANT CHANGE UP
NRBC # BLD: 0 /100 WBCS — SIGNIFICANT CHANGE UP (ref 0–0)
PH UR: 5 — SIGNIFICANT CHANGE UP (ref 5–8)
PHOSPHATE SERPL-MCNC: 4.6 MG/DL — SIGNIFICANT CHANGE UP (ref 2.1–4.9)
PLATELET # BLD AUTO: 196 K/UL — SIGNIFICANT CHANGE UP (ref 130–400)
POTASSIUM SERPL-MCNC: 4.6 MMOL/L — SIGNIFICANT CHANGE UP (ref 3.5–5)
POTASSIUM SERPL-SCNC: 4.6 MMOL/L — SIGNIFICANT CHANGE UP (ref 3.5–5)
PROT SERPL-MCNC: 6.1 G/DL — SIGNIFICANT CHANGE UP (ref 6–8)
PROT UR-MCNC: NEGATIVE — SIGNIFICANT CHANGE UP
PROTHROM AB SERPL-ACNC: 16 SEC — HIGH (ref 9.95–12.87)
RBC # BLD: 4.35 M/UL — LOW (ref 4.7–6.1)
RBC # FLD: 13.2 % — SIGNIFICANT CHANGE UP (ref 11.5–14.5)
SODIUM SERPL-SCNC: 134 MMOL/L — LOW (ref 135–146)
SP GR SPEC: 1.01 — SIGNIFICANT CHANGE UP (ref 1.01–1.02)
UROBILINOGEN FLD QL: SIGNIFICANT CHANGE UP
WBC # BLD: 4.67 K/UL — LOW (ref 4.8–10.8)
WBC # FLD AUTO: 4.67 K/UL — LOW (ref 4.8–10.8)

## 2020-05-31 PROCEDURE — 71045 X-RAY EXAM CHEST 1 VIEW: CPT | Mod: 26

## 2020-05-31 PROCEDURE — 93010 ELECTROCARDIOGRAM REPORT: CPT

## 2020-05-31 RX ORDER — ATORVASTATIN CALCIUM 80 MG/1
80 TABLET, FILM COATED ORAL AT BEDTIME
Refills: 0 | Status: DISCONTINUED | OUTPATIENT
Start: 2020-05-31 | End: 2020-06-02

## 2020-05-31 RX ORDER — EPTIFIBATIDE 2 MG/ML
2 INJECTION, SOLUTION INTRAVENOUS
Qty: 75 | Refills: 0 | Status: DISCONTINUED | OUTPATIENT
Start: 2020-05-31 | End: 2020-06-02

## 2020-05-31 RX ORDER — ASPIRIN/CALCIUM CARB/MAGNESIUM 324 MG
81 TABLET ORAL DAILY
Refills: 0 | Status: DISCONTINUED | OUTPATIENT
Start: 2020-05-31 | End: 2020-06-01

## 2020-05-31 RX ORDER — CEFOTETAN DISODIUM 1 G
1 VIAL (EA) INJECTION EVERY 12 HOURS
Refills: 0 | Status: DISCONTINUED | OUTPATIENT
Start: 2020-06-01 | End: 2020-06-02

## 2020-05-31 RX ORDER — PANTOPRAZOLE SODIUM 20 MG/1
40 TABLET, DELAYED RELEASE ORAL
Refills: 0 | Status: DISCONTINUED | OUTPATIENT
Start: 2020-05-31 | End: 2020-06-02

## 2020-05-31 RX ORDER — CHLORHEXIDINE GLUCONATE 213 G/1000ML
1 SOLUTION TOPICAL
Refills: 0 | Status: DISCONTINUED | OUTPATIENT
Start: 2020-05-31 | End: 2020-06-02

## 2020-05-31 RX ORDER — CEFOTETAN DISODIUM 1 G
1 VIAL (EA) INJECTION ONCE
Refills: 0 | Status: COMPLETED | OUTPATIENT
Start: 2020-05-31 | End: 2020-05-31

## 2020-05-31 RX ORDER — HEPARIN SODIUM 5000 [USP'U]/ML
5000 INJECTION INTRAVENOUS; SUBCUTANEOUS EVERY 8 HOURS
Refills: 0 | Status: DISCONTINUED | OUTPATIENT
Start: 2020-05-31 | End: 2020-05-31

## 2020-05-31 RX ORDER — METOPROLOL TARTRATE 50 MG
25 TABLET ORAL DAILY
Refills: 0 | Status: DISCONTINUED | OUTPATIENT
Start: 2020-05-31 | End: 2020-06-02

## 2020-05-31 RX ORDER — CEFOTETAN DISODIUM 1 G
VIAL (EA) INJECTION
Refills: 0 | Status: DISCONTINUED | OUTPATIENT
Start: 2020-05-31 | End: 2020-06-02

## 2020-05-31 RX ADMIN — Medication 100 GRAM(S): at 13:32

## 2020-05-31 RX ADMIN — EPTIFIBATIDE 14.7 MICROGRAM(S)/KG/MIN: 2 INJECTION, SOLUTION INTRAVENOUS at 19:26

## 2020-05-31 RX ADMIN — ATORVASTATIN CALCIUM 80 MILLIGRAM(S): 80 TABLET, FILM COATED ORAL at 21:54

## 2020-05-31 NOTE — H&P ADULT - NSHPLABSRESULTS_GEN_ALL_CORE
< from: MR Abdomen No Cont (04.16.20 @ 13:51) >    IMPRESSION:  Percutaneous gallbladder drainage catheter with tip in the gallbladder; gallbladder is collapsed and cannot be fully evaluated.  No evidence of perihepatic ascites.  No evidence of biliary duct dilatation. No evidence of CBD stone.  Fatty infiltration of the liver

## 2020-05-31 NOTE — CONSULT NOTE ADULT - SUBJECTIVE AND OBJECTIVE BOX
Date of Admission: 5/31/2020    CHIEF COMPLAINT: Planned Lap Maddy    HISTORY OF PRESENT ILLNESS: 60M w/ PMH of HTN and CAD s/p PCI (9/25/19, Dr. Mcclure) on aspirin and brilinta who presents for pre-admission optimization for laparoscopic cholecystectomy. Pt underwent percutaneous cholecystostomy on 2/19/20 at [facility] in [place] for cholecystitis with positive HIDA. Recently admitted and discharged on 3/12/20 for IR guided drain exchange of cholecystostomy drain, but drain has since fallen out. Denies pain, nausea, vomiting.    Pt admitted pre-op for conversion of antiplatelet therapy to IV medication.       PAST MEDICAL & SURGICAL HISTORY:  HTN (hypertension)  CAD in native artery: stents 9/2019  No significant past surgical history      FAMILY HISTORY:  [x] no pertinent family history of premature cardiovascular disease in first degree relatives.  Mother:   Father:   Siblings:     SOCIAL HISTORY:    [x] Non-smoker  [ ] Smoker  [ ] Alcohol    Allergies    No Known Allergies    Intolerances    	    REVIEW OF SYSTEMS:  CONSTITUTIONAL: denies fever, weight loss, or fatigue  CARDIOLOGY: denies chest pain, shortness of breath or syncopal episodes.   RESPIRATORY: denies shortness of breath, wheezing.   NEUROLOGICAL: denies weakness, no focal deficits to report.  ENDOCRINOLOGICAL: no recent change in diabetic medications.   GI: see HPI  PSYCHIATRY: normal mood and affect  HEENT: no nasal discharge, no ecchymosis  SKIN: no ecchymosis, no breakdown  MUSCULOSKELETAL: Full range of motion x4.     PHYSICAL EXAM:  T(C): 36.1 (05-31-20 @ 13:51), Max: 36.1 (05-31-20 @ 13:51)  HR: 88 (05-31-20 @ 13:51) (88 - 88)  BP: 107/67 (05-31-20 @ 13:51) (107/67 - 107/67)  RR: 18 (05-31-20 @ 13:51) (18 - 18)  SpO2: 97% (05-31-20 @ 14:35) (97% - 97%)  Wt(kg): --  I&O's Summary      General Appearance: well appearing, normal for age and gender. 	  Neck: normal JVP, no bruit.   Eyes: No xanthomalasia, Extra Ocular muscles intact.   Cardiovascular: regular rate and rhythm S1 S2, No JVD, No murmurs, No edema  Respiratory: Lungs clear to auscultation	  Psychiatry: Alert and oriented x 3, Mood & affect appropriate  Gastrointestinal:  Soft, Non-tender  Skin/Integumen: No rashes, No ecchymoses, No cyanosis	  Neurologic: Non-focal  Musculoskeletal/extremities: Normal range of motion, No clubbing, cyanosis or edema  Vascular: Peripheral pulses palpable 2+ bilaterally    LABS:	 	      TELEMETRY EVENTS: 	not on telemetry     ECG:  	Sinus rhythm at 75 bpm   RADIOLOGY:  < from: Xray Chest 1 View- PORTABLE-Urgent (05.31.20 @ 13:48) >  Impression:      No radiographic evidence of acute cardiopulmonary disease.    < end of copied text >    OTHER: 	    PREVIOUS DIAGNOSTIC TESTING:    [ ] Echocardiogram:  < from: Transthoracic Echocardiogram (09.25.19 @ 10:36) >  Summary:   1. Left ventricular ejection fraction, by visual estimation, is 60 to   65%.   2. Spectral Doppler shows impaired relaxation pattern of left   ventricular myocardial filling (Grade I diastolic dysfunction).   3. Mild mitral valve regurgitation.    < end of copied text >    [ ] Catheterization: 9.25.19   FINDINGS    Left Heart Catheterization:  LVEF%: 55  LVEDP: WNL  Right radial 6 Fr  Radial D Stat  Co-dominant                              Left main Normal  LAD: Normal                        Diag: Normal  Left Circumflex:  Mid Cx 95% diffuse lesion  OM: Normal  Right Coronary Artery: Normal  RPDA Normal    INTERVENTION  PCI of mid Cx     IMPLANTS:  Synergy 3.5 28    	    Home Medications:    MEDICATIONS  (STANDING):  cefoTEtan  IVPB      chlorhexidine 4% Liquid 1 Application(s) Topical <User Schedule>  metoprolol succinate ER 25 milliGRAM(s) Oral daily  pantoprazole    Tablet 40 milliGRAM(s) Oral before breakfast    MEDICATIONS  (PRN):

## 2020-05-31 NOTE — CONSULT NOTE ADULT - ASSESSMENT
Patient is a 60y old  Male who presents with a chief complaint of pre-admission for laparoscopic cholecystectomy.  Planned on Tuesday 5/31/2020. Last dose of Brilinta was yesterday.    Cholecystitis- planned for interval lap ashlie  HTN (hypertension)  NSTEMI s/p PCI to LCx on 9/2019    Recommendations:   Please obtain STAT labs: CBC, PT/PTT, BMP  Start Integrilin drip at 2 mcg/kg/min (max:15 mg/hour) if plts>[id number] and PT/PTT WNL   Needs telemonitoring transfer to   Monitor CBC and Cr q4-6 hours  Stop drip if Plts<[id number]  Monitor SBP, avoid hypertension  Monitor for signs and symptoms of bleeding  Patient is considered at intermediate risk of MACE going for a moderate risk surgery Patient is a 60y old  Male who presents with a chief complaint of pre-admission for laparoscopic cholecystectomy.  Planned on Tuesday 5/31/2020. Last dose of Brilinta was yesterday.    Cholecystitis- planned for interval lap ashlie  HTN (hypertension)  NSTEMI s/p PCI to LCx on 9/2019    Recommendations:   Please obtain STAT labs: CBC, PT/PTT, BMP  Start Integrilin drip at 2 mcg/kg/min (max:15 mg/hour) if plts>[id number] and PT/PTT WNL   Needs telemonitoring transfer to   Monitor CBC and Cr q4-6 hours  Stop drip if Plts<[id number], keep active T&S  Monitor SBP, avoid hypertension  Monitor for signs and symptoms of bleeding  Patient is considered at intermediate risk of MACE going for a moderate risk surgery Patient is a 60y old  Male who presents with a chief complaint of pre-admission for laparoscopic cholecystectomy.  Planned on Tuesday 5/31/2020. Last dose of Brilinta was yesterday.    Cholecystitis- planned for interval lap ashlie  HTN (hypertension)  NSTEMI s/p PCI to LCx on 9/2019    Recommendations:   Please obtain STAT labs: CBC, PT/PTT, BMP  Start Integrilin drip at 2 mcg/kg/min (max:15 mg/hour) if plts>[id number] and PT/PTT WNL   Needs telemonitoring transfer to   Monitor CBC and Cr q4-6 hours  Stop drip if Plts<[id number], keep active T&S  Monitor SBP, avoid hypertension  Monitor for signs and symptoms of bleeding  c/w ASA, lipitor and metoprolol  Patient is considered at intermediate risk of MACE going for a moderate risk surgery Patient is a 60y old  Male who presents with a chief complaint of pre-admission for laparoscopic cholecystectomy.  Planned on Tuesday 5/31/2020. Last dose of Brilinta was yesterday.    Cholecystitis- planned for interval lap ashlie  HTN (hypertension)  NSTEMI s/p PCI to LCx on 9/2019    Recommendations:   Please obtain STAT labs: CBC, PT/PTT, BMP  Start Integrilin drip at 2 mcg/kg/min (max:15 mg/hour) if plts>[id number] and PT/PTT WNL   Needs telemonitoring transfer to   Monitor CBC and Cr q4-6 hours  Stop drip if Plts<[id number], keep active T&S  Monitor SBP, avoid hypertension  Monitor for signs and symptoms of bleeding  c/w ASA (hold ASA if needed to be held as per surgery team), lipitor and metoprolol  Patient is considered at intermediate risk of MACE going for a moderate risk surgery

## 2020-05-31 NOTE — H&P ADULT - ASSESSMENT
60M w/ PMH of HTN and CAD s/p PCI (9/25/19, Dr. Mcclure) on aspirin and brilinta who presents for pre-admission optimization for laparoscopic cholecystectomy.    PLAN:   - FU cardiology for IV antiplatelet therapy  - low-fat diet  - start cefotetan BID  - resume home medications   - d/w Dr. Avalos and patient

## 2020-05-31 NOTE — H&P ADULT - HISTORY OF PRESENT ILLNESS
60M w/ PMH of HTN and CAD s/p PCI (9/25/19, Dr. Mcclure) on aspirin and brilinta who presents for pre-admission optimization for laparoscopic cholecystectomy. Pt underwent percutaneous cholecystostomy on 2/19/20 at [facility] in [place] for cholecystitis with positive HIDA. Recently admitted and discharged on 3/12/20 for IR guided drain exchange of cholecystostomy drain, but drain has since fallen out. Denies pain, nausea, vomiting.    Pt admitted pre-op for conversion of antiplatelet therapy to IV medication.

## 2020-05-31 NOTE — PROCEDURE NOTE - NSPROCDETAILS_GEN_ALL_CORE
location identified, draped/prepped, sterile technique used/ultrasound assessment/sterile technique, catheter placed/ultrasound guidance/sterile dressing applied

## 2020-06-01 LAB
ALBUMIN SERPL ELPH-MCNC: 3.3 G/DL — LOW (ref 3.5–5.2)
ALBUMIN SERPL ELPH-MCNC: 3.9 G/DL — SIGNIFICANT CHANGE UP (ref 3.5–5.2)
ALP SERPL-CCNC: 104 U/L — SIGNIFICANT CHANGE UP (ref 30–115)
ALP SERPL-CCNC: 89 U/L — SIGNIFICANT CHANGE UP (ref 30–115)
ALT FLD-CCNC: 27 U/L — SIGNIFICANT CHANGE UP (ref 0–41)
ALT FLD-CCNC: 30 U/L — SIGNIFICANT CHANGE UP (ref 0–41)
ANION GAP SERPL CALC-SCNC: 9 MMOL/L — SIGNIFICANT CHANGE UP (ref 7–14)
ANION GAP SERPL CALC-SCNC: 9 MMOL/L — SIGNIFICANT CHANGE UP (ref 7–14)
APTT BLD: 37.1 SEC — SIGNIFICANT CHANGE UP (ref 27–39.2)
APTT BLD: 37.3 SEC — SIGNIFICANT CHANGE UP (ref 27–39.2)
APTT BLD: 37.7 SEC — SIGNIFICANT CHANGE UP (ref 27–39.2)
AST SERPL-CCNC: 22 U/L — SIGNIFICANT CHANGE UP (ref 0–41)
AST SERPL-CCNC: 24 U/L — SIGNIFICANT CHANGE UP (ref 0–41)
BASOPHILS # BLD AUTO: 0.03 K/UL — SIGNIFICANT CHANGE UP (ref 0–0.2)
BASOPHILS NFR BLD AUTO: 0.5 % — SIGNIFICANT CHANGE UP (ref 0–1)
BILIRUB DIRECT SERPL-MCNC: 0.2 MG/DL — SIGNIFICANT CHANGE UP (ref 0–0.2)
BILIRUB INDIRECT FLD-MCNC: 1.1 MG/DL — SIGNIFICANT CHANGE UP (ref 0.2–1.2)
BILIRUB SERPL-MCNC: 1.3 MG/DL — HIGH (ref 0.2–1.2)
BILIRUB SERPL-MCNC: 1.7 MG/DL — HIGH (ref 0.2–1.2)
BUN SERPL-MCNC: 14 MG/DL — SIGNIFICANT CHANGE UP (ref 10–20)
BUN SERPL-MCNC: 15 MG/DL — SIGNIFICANT CHANGE UP (ref 10–20)
CALCIUM SERPL-MCNC: 7.2 MG/DL — LOW (ref 8.5–10.1)
CALCIUM SERPL-MCNC: 9.4 MG/DL — SIGNIFICANT CHANGE UP (ref 8.5–10.1)
CALCIUM SERPL-MCNC: 9.6 MG/DL — SIGNIFICANT CHANGE UP (ref 8.5–10.1)
CHLORIDE SERPL-SCNC: 105 MMOL/L — SIGNIFICANT CHANGE UP (ref 98–110)
CHLORIDE SERPL-SCNC: 113 MMOL/L — HIGH (ref 98–110)
CO2 SERPL-SCNC: 21 MMOL/L — SIGNIFICANT CHANGE UP (ref 17–32)
CO2 SERPL-SCNC: 24 MMOL/L — SIGNIFICANT CHANGE UP (ref 17–32)
CREAT SERPL-MCNC: 0.6 MG/DL — LOW (ref 0.7–1.5)
CREAT SERPL-MCNC: 0.8 MG/DL — SIGNIFICANT CHANGE UP (ref 0.7–1.5)
EOSINOPHIL # BLD AUTO: 0.62 K/UL — SIGNIFICANT CHANGE UP (ref 0–0.7)
EOSINOPHIL NFR BLD AUTO: 10.4 % — HIGH (ref 0–8)
GLUCOSE SERPL-MCNC: 142 MG/DL — HIGH (ref 70–99)
GLUCOSE SERPL-MCNC: 74 MG/DL — SIGNIFICANT CHANGE UP (ref 70–99)
HCT VFR BLD CALC: 38.9 % — LOW (ref 42–52)
HCT VFR BLD CALC: 40.7 % — LOW (ref 42–52)
HCT VFR BLD CALC: 41.1 % — LOW (ref 42–52)
HCT VFR BLD CALC: 42.1 % — SIGNIFICANT CHANGE UP (ref 42–52)
HGB BLD-MCNC: 13 G/DL — LOW (ref 14–18)
HGB BLD-MCNC: 13.2 G/DL — LOW (ref 14–18)
HGB BLD-MCNC: 13.7 G/DL — LOW (ref 14–18)
HGB BLD-MCNC: 14.3 G/DL — SIGNIFICANT CHANGE UP (ref 14–18)
IMM GRANULOCYTES NFR BLD AUTO: 0.2 % — SIGNIFICANT CHANGE UP (ref 0.1–0.3)
INR BLD: 1.35 RATIO — HIGH (ref 0.65–1.3)
INR BLD: 1.37 RATIO — HIGH (ref 0.65–1.3)
INR BLD: 1.38 RATIO — HIGH (ref 0.65–1.3)
LYMPHOCYTES # BLD AUTO: 2.73 K/UL — SIGNIFICANT CHANGE UP (ref 1.2–3.4)
LYMPHOCYTES # BLD AUTO: 45.8 % — SIGNIFICANT CHANGE UP (ref 20.5–51.1)
MCHC RBC-ENTMCNC: 29 PG — SIGNIFICANT CHANGE UP (ref 27–31)
MCHC RBC-ENTMCNC: 29.7 PG — SIGNIFICANT CHANGE UP (ref 27–31)
MCHC RBC-ENTMCNC: 30.3 PG — SIGNIFICANT CHANGE UP (ref 27–31)
MCHC RBC-ENTMCNC: 30.4 PG — SIGNIFICANT CHANGE UP (ref 27–31)
MCHC RBC-ENTMCNC: 32.4 G/DL — SIGNIFICANT CHANGE UP (ref 32–37)
MCHC RBC-ENTMCNC: 33.3 G/DL — SIGNIFICANT CHANGE UP (ref 32–37)
MCHC RBC-ENTMCNC: 33.4 G/DL — SIGNIFICANT CHANGE UP (ref 32–37)
MCHC RBC-ENTMCNC: 34 G/DL — SIGNIFICANT CHANGE UP (ref 32–37)
MCV RBC AUTO: 89.2 FL — SIGNIFICANT CHANGE UP (ref 80–94)
MCV RBC AUTO: 89.5 FL — SIGNIFICANT CHANGE UP (ref 80–94)
MCV RBC AUTO: 89.6 FL — SIGNIFICANT CHANGE UP (ref 80–94)
MCV RBC AUTO: 90.7 FL — SIGNIFICANT CHANGE UP (ref 80–94)
MONOCYTES # BLD AUTO: 0.63 K/UL — HIGH (ref 0.1–0.6)
MONOCYTES NFR BLD AUTO: 10.6 % — HIGH (ref 1.7–9.3)
NEUTROPHILS # BLD AUTO: 1.94 K/UL — SIGNIFICANT CHANGE UP (ref 1.4–6.5)
NEUTROPHILS NFR BLD AUTO: 32.5 % — LOW (ref 42.2–75.2)
NRBC # BLD: 0 /100 WBCS — SIGNIFICANT CHANGE UP (ref 0–0)
PLATELET # BLD AUTO: 191 K/UL — SIGNIFICANT CHANGE UP (ref 130–400)
PLATELET # BLD AUTO: 203 K/UL — SIGNIFICANT CHANGE UP (ref 130–400)
PLATELET # BLD AUTO: 207 K/UL — SIGNIFICANT CHANGE UP (ref 130–400)
PLATELET # BLD AUTO: 214 K/UL — SIGNIFICANT CHANGE UP (ref 130–400)
POTASSIUM SERPL-MCNC: 3.7 MMOL/L — SIGNIFICANT CHANGE UP (ref 3.5–5)
POTASSIUM SERPL-MCNC: 3.9 MMOL/L — SIGNIFICANT CHANGE UP (ref 3.5–5)
POTASSIUM SERPL-SCNC: 3.7 MMOL/L — SIGNIFICANT CHANGE UP (ref 3.5–5)
POTASSIUM SERPL-SCNC: 3.9 MMOL/L — SIGNIFICANT CHANGE UP (ref 3.5–5)
PROT SERPL-MCNC: 5.4 G/DL — LOW (ref 6–8)
PROT SERPL-MCNC: 6 G/DL — SIGNIFICANT CHANGE UP (ref 6–8)
PROTHROM AB SERPL-ACNC: 15.5 SEC — HIGH (ref 9.95–12.87)
PROTHROM AB SERPL-ACNC: 15.8 SEC — HIGH (ref 9.95–12.87)
PROTHROM AB SERPL-ACNC: 15.9 SEC — HIGH (ref 9.95–12.87)
RBC # BLD: 4.29 M/UL — LOW (ref 4.7–6.1)
RBC # BLD: 4.55 M/UL — LOW (ref 4.7–6.1)
RBC # BLD: 4.61 M/UL — LOW (ref 4.7–6.1)
RBC # BLD: 4.7 M/UL — SIGNIFICANT CHANGE UP (ref 4.7–6.1)
RBC # FLD: 13.2 % — SIGNIFICANT CHANGE UP (ref 11.5–14.5)
RBC # FLD: 13.3 % — SIGNIFICANT CHANGE UP (ref 11.5–14.5)
SARS-COV-2 RNA SPEC QL NAA+PROBE: SIGNIFICANT CHANGE UP
SODIUM SERPL-SCNC: 138 MMOL/L — SIGNIFICANT CHANGE UP (ref 135–146)
SODIUM SERPL-SCNC: 143 MMOL/L — SIGNIFICANT CHANGE UP (ref 135–146)
WBC # BLD: 4.47 K/UL — LOW (ref 4.8–10.8)
WBC # BLD: 4.88 K/UL — SIGNIFICANT CHANGE UP (ref 4.8–10.8)
WBC # BLD: 4.93 K/UL — SIGNIFICANT CHANGE UP (ref 4.8–10.8)
WBC # BLD: 5.96 K/UL — SIGNIFICANT CHANGE UP (ref 4.8–10.8)
WBC # FLD AUTO: 4.47 K/UL — LOW (ref 4.8–10.8)
WBC # FLD AUTO: 4.88 K/UL — SIGNIFICANT CHANGE UP (ref 4.8–10.8)
WBC # FLD AUTO: 4.93 K/UL — SIGNIFICANT CHANGE UP (ref 4.8–10.8)
WBC # FLD AUTO: 5.96 K/UL — SIGNIFICANT CHANGE UP (ref 4.8–10.8)

## 2020-06-01 PROCEDURE — 99221 1ST HOSP IP/OBS SF/LOW 40: CPT

## 2020-06-01 RX ORDER — CALCIUM GLUCONATE 100 MG/ML
2 VIAL (ML) INTRAVENOUS ONCE
Refills: 0 | Status: COMPLETED | OUTPATIENT
Start: 2020-06-01 | End: 2020-06-01

## 2020-06-01 RX ORDER — SODIUM CHLORIDE 9 MG/ML
1000 INJECTION, SOLUTION INTRAVENOUS
Refills: 0 | Status: DISCONTINUED | OUTPATIENT
Start: 2020-06-02 | End: 2020-06-02

## 2020-06-01 RX ADMIN — Medication 100 GRAM(S): at 18:11

## 2020-06-01 RX ADMIN — Medication 200 GRAM(S): at 09:17

## 2020-06-01 RX ADMIN — Medication 100 GRAM(S): at 06:28

## 2020-06-01 RX ADMIN — EPTIFIBATIDE 14.7 MICROGRAM(S)/KG/MIN: 2 INJECTION, SOLUTION INTRAVENOUS at 20:37

## 2020-06-01 RX ADMIN — PANTOPRAZOLE SODIUM 40 MILLIGRAM(S): 20 TABLET, DELAYED RELEASE ORAL at 06:28

## 2020-06-01 RX ADMIN — EPTIFIBATIDE 14.7 MICROGRAM(S)/KG/MIN: 2 INJECTION, SOLUTION INTRAVENOUS at 06:59

## 2020-06-01 RX ADMIN — Medication 25 MILLIGRAM(S): at 06:28

## 2020-06-01 RX ADMIN — ATORVASTATIN CALCIUM 80 MILLIGRAM(S): 80 TABLET, FILM COATED ORAL at 21:42

## 2020-06-01 NOTE — PROGRESS NOTE ADULT - ASSESSMENT
60M w/ PMH of HTN and CAD s/p PCI (9/25/19, Dr. Mcclure) on aspirin and brilinta who presents for pre-admission optimization for laparoscopic cholecystectomy.    PLAN:   - DASH, IVL  - NPO at midnight 6/2, IVF  - continue integrilin gtt  - FU Dr. Mcclure for restarting aspirin  - Q4-6H CBC/BMP  - continue cefotetan  - encourage ambulation, IS

## 2020-06-01 NOTE — PROGRESS NOTE ADULT - SUBJECTIVE AND OBJECTIVE BOX
Denies any chest pain, SOB or palpitations.    No orthopnea or PND.   Scheduled for Lap Maddy tomorrow       MEDICATIONS  (STANDING):  atorvastatin 80 milliGRAM(s) Oral at bedtime  cefoTEtan  IVPB      cefoTEtan  IVPB 1 Gram(s) IV Intermittent every 12 hours  chlorhexidine 4% Liquid 1 Application(s) Topical <User Schedule>  eptifibatide Infusion 75 mg/100 mL 2 MICROgram(s)/kG/Min (14.7 mL/Hr) IV Continuous <Continuous>  metoprolol succinate ER 25 milliGRAM(s) Oral daily  pantoprazole    Tablet 40 milliGRAM(s) Oral before breakfast    MEDICATIONS  (PRN):            Vital Signs Last 24 Hrs  T(C): 36.4 (01 Jun 2020 06:00), Max: 36.4 (01 Jun 2020 06:00)  T(F): 97.5 (01 Jun 2020 06:00), Max: 97.5 (01 Jun 2020 06:00)  HR: 13 (01 Jun 2020 06:00) (13 - 88)  BP: 117/64 (01 Jun 2020 06:00) (107/67 - 130/77)  BP(mean): 94 (31 May 2020 22:16) (94 - 94)  RR: 19 (01 Jun 2020 06:00) (18 - 19)  SpO2: 98% (31 May 2020 22:16) (97% - 98%)     REVIEW OF SYSTEMS:  CONSTITUTIONAL: No fever, weight loss, or fatigue  CARDIOLOGY: PAtient denies chest pain, shortness of breath or syncopal episodes.   RESPIRATORY: denies shortness of breath, wheezeing.   NEUROLOGICAL: NO weakness, no focal deficits to report.  GI: no BRBPR, no N,V,diarrhea.    PSYCHIATRY: normal mood and affect  HEENT: no nasal discharge, no ecchymosis  SKIN: no ecchymosis, no breakdown  MUSCULOSKELETAL: Full range of motion x4.        PHYSICAL EXAM:  · CONSTITUTIONAL:	Well-developed, well nourished    BMI-  ·RESPIRATORY:   airway patent; breath sounds equal; good air movement; respirations non-labored; clear to auscultation bilaterally; no chest wall tenderness; no intercostal retractions; no rales,rhonchi or wheeze  · CARDIOVASCULAR	regular rate and rhythm  no rub  no murmur  normal PMI  · EXTREMITIES: No cyanosis, clubbing or edema  · VASCULAR: 	Equal and normal pulses (carotid, femoral, dorsalis pedis)  	  TELEMETRY: Sinus     LABS:                        13.7   4.88  )-----------( 203      ( 01 Jun 2020 04:35 )             41.1     05-31    143  |  113<H>  |  14  ----------------------------<  74  3.7   |  21  |  0.6<L>    Ca    7.2<L>      31 May 2020 23:50  Phos  4.6     05-31  Mg     2.1     05-31    TPro  5.4<L>  /  Alb  3.3<L>  /  TBili  1.3<H>  /  DBili  0.2  /  AST  22  /  ALT  27  /  AlkPhos  89  06-01        PT/INR - ( 31 May 2020 17:24 )   PT: 16.00 sec;   INR: 1.39 ratio         PTT - ( 31 May 2020 17:24 )  PTT:38.4 sec    I&O's Summary    31 May 2020 07:01  -  01 Jun 2020 07:00  --------------------------------------------------------  IN: 176.4 mL / OUT: 1350 mL / NET: -1173.6 mL      BNP  RADIOLOGY & ADDITIONAL STUDIES:    IMPRESSION AND PLAN:    Continue current care  Integrilin drip to be held 2 hours prior to surgery  ASA needed to be held by Surgery but will resume post op   Brilinta bolus the morning after the procedure

## 2020-06-01 NOTE — PROGRESS NOTE ADULT - ATTENDING COMMENTS
The patient seen and examined with house staff at 0830 hrs. History and physical as noted. Admitted yesterday to convert from oral to IV antiplatelet therapy in light of his cardiac stents, and on the direction of his cardiologist. The patient looks and feels well and is asymptomatic as far as his gallbladder. The cholecystostomy tube dislodged last week and he has had no biliary symptoms since then and no fever abdominal pain jaundice or change in the bowel or bladder habit.  The patient is anicteric and in no distress, appetite is good.  Abdomen is soft and not distended. Gallbladder not palpable, Lim sign negative, tube exit site is clean and dry.  Blood work chest x-ray and chest CT noted, liver function tests are normal, covid test is negative.  He is now on IV Integrilin, aspirin is being held, no subcutaneous heparin being given. He will be n.p.o. after midnight for surgery tomorrow. The procedure was reviewed in detail with his risks and benefits, including the possibility of conversion to an open approach as may be deemed necessary at the time of surgery, especially so in light of the recent acute cholecystitis and percutaneous drainage.  All his questions were answered and and consent has been signed and is on the chart. Serum calcium will be repeated.

## 2020-06-01 NOTE — PROGRESS NOTE ADULT - SUBJECTIVE AND OBJECTIVE BOX
GENERAL SURGERY PROGRESS NOTE     VICKY SPENCER  60y  Male  Hospital day :1d  POD:  Procedure:     OVERNIGHT EVENTS:  Admitted for IV integrilin. Midline placed in LUE.    T(F): 96.2 (20 @ 22:16), Max: 97.2 (20 @ 18:26)  HR: 78 (20 @ 22:16) (77 - 88)  BP: 130/77 (20 @ 22:16) (107/67 - 130/77)  RR: 19 (20 @ 22:16) (18 - 19)  SpO2: 98% (20 @ 22:16) (97% - 98%)     GI proph:  pantoprazole    Tablet 40 milliGRAM(s) Oral before breakfast  AC/ proph: aspirin enteric coated 81 milliGRAM(s) Oral daily    ABx: cefoTEtan  IVPB      cefoTEtan  IVPB 1 Gram(s) IV Intermittent every 12 hours      PHYSICAL EXAM:  GENERAL: NAD, well-appearing  CHEST/LUNG: Clear to auscultation bilaterally  HEART: Regular rate and rhythm  ABDOMEN: Soft, Nontender, Nondistended; well-healing PTC  EXTREMITIES: No clubbing, cyanosis, or edema      LABS                      14.3   5.96  )-----------( 214      ( 31 May 2020 23:50 )             42.1       Auto Neutrophil %: 32.5 % (20 @ 23:50)  Auto Immature Granulocyte %: 0.2 % (20 @ 23:50)  Auto Neutrophil %: 35.9 % (20 @ 17:24)  Auto Immature Granulocyte %: 0.0 % (20 @ 17:24)        143  |  113<H>  |  14  ----------------------------<  74  3.7   |  21  |  0.6<L>    Calcium, Total Serum: 7.2 mg/dL (20 @ 23:50)    LFTs:             5.4  | 1.3  | 22       ------------------[89      ( 2020 01:04 )  3.3  | 0.2  | 27          Coags:     16.00  ----< 1.39    ( 31 May 2020 17:24 )     38.4      Urinalysis Basic - ( 31 May 2020 18:19 )    Color: Light Yellow / Appearance: Clear / S.015 / pH: x  Gluc: x / Ketone: Negative  / Bili: Negative / Urobili: <2 mg/dL   Blood: x / Protein: Negative / Nitrite: Negative   Leuk Esterase: Negative / RBC: x / WBC x   Sq Epi: x / Non Sq Epi: x / Bacteria: x      RADIOLOGY & ADDITIONAL TESTS:  < from: Xray Chest 1 View- PORTABLE-Urgent (20 @ 13:48) >  Impression:      No radiographic evidence of acute cardiopulmonary disease.

## 2020-06-02 ENCOUNTER — NON-APPOINTMENT (OUTPATIENT)
Age: 60
End: 2020-06-02

## 2020-06-02 ENCOUNTER — RESULT REVIEW (OUTPATIENT)
Age: 60
End: 2020-06-02

## 2020-06-02 LAB
APTT BLD: 35.7 SEC — SIGNIFICANT CHANGE UP (ref 27–39.2)
APTT BLD: 38.4 SEC — SIGNIFICANT CHANGE UP (ref 27–39.2)
CA-I BLD-SCNC: 1.3 MMOL/L — SIGNIFICANT CHANGE UP (ref 1.12–1.3)
GRAM STN FLD: SIGNIFICANT CHANGE UP
HCT VFR BLD CALC: 38.8 % — LOW (ref 42–52)
HCT VFR BLD CALC: 39.8 % — LOW (ref 42–52)
HGB BLD-MCNC: 12.9 G/DL — LOW (ref 14–18)
HGB BLD-MCNC: 13 G/DL — LOW (ref 14–18)
INR BLD: 1.38 RATIO — HIGH (ref 0.65–1.3)
INR BLD: 1.4 RATIO — HIGH (ref 0.65–1.3)
MCHC RBC-ENTMCNC: 28.8 PG — SIGNIFICANT CHANGE UP (ref 27–31)
MCHC RBC-ENTMCNC: 30.1 PG — SIGNIFICANT CHANGE UP (ref 27–31)
MCHC RBC-ENTMCNC: 32.4 G/DL — SIGNIFICANT CHANGE UP (ref 32–37)
MCHC RBC-ENTMCNC: 33.5 G/DL — SIGNIFICANT CHANGE UP (ref 32–37)
MCV RBC AUTO: 88.8 FL — SIGNIFICANT CHANGE UP (ref 80–94)
MCV RBC AUTO: 89.8 FL — SIGNIFICANT CHANGE UP (ref 80–94)
NRBC # BLD: 0 /100 WBCS — SIGNIFICANT CHANGE UP (ref 0–0)
NRBC # BLD: 0 /100 WBCS — SIGNIFICANT CHANGE UP (ref 0–0)
PLATELET # BLD AUTO: 172 K/UL — SIGNIFICANT CHANGE UP (ref 130–400)
PLATELET # BLD AUTO: 185 K/UL — SIGNIFICANT CHANGE UP (ref 130–400)
PROTHROM AB SERPL-ACNC: 15.9 SEC — HIGH (ref 9.95–12.87)
PROTHROM AB SERPL-ACNC: 16.1 SEC — HIGH (ref 9.95–12.87)
RBC # BLD: 4.32 M/UL — LOW (ref 4.7–6.1)
RBC # BLD: 4.48 M/UL — LOW (ref 4.7–6.1)
RBC # FLD: 13 % — SIGNIFICANT CHANGE UP (ref 11.5–14.5)
RBC # FLD: 13.2 % — SIGNIFICANT CHANGE UP (ref 11.5–14.5)
SPECIMEN SOURCE: SIGNIFICANT CHANGE UP
WBC # BLD: 4.55 K/UL — LOW (ref 4.8–10.8)
WBC # BLD: 4.98 K/UL — SIGNIFICANT CHANGE UP (ref 4.8–10.8)
WBC # FLD AUTO: 4.55 K/UL — LOW (ref 4.8–10.8)
WBC # FLD AUTO: 4.98 K/UL — SIGNIFICANT CHANGE UP (ref 4.8–10.8)

## 2020-06-02 PROCEDURE — 47562 LAPAROSCOPIC CHOLECYSTECTOMY: CPT

## 2020-06-02 PROCEDURE — 88304 TISSUE EXAM BY PATHOLOGIST: CPT | Mod: 26

## 2020-06-02 RX ORDER — ATORVASTATIN CALCIUM 80 MG/1
80 TABLET, FILM COATED ORAL AT BEDTIME
Refills: 0 | Status: DISCONTINUED | OUTPATIENT
Start: 2020-06-02 | End: 2020-06-03

## 2020-06-02 RX ORDER — PANTOPRAZOLE SODIUM 20 MG/1
40 TABLET, DELAYED RELEASE ORAL
Refills: 0 | Status: DISCONTINUED | OUTPATIENT
Start: 2020-06-02 | End: 2020-06-03

## 2020-06-02 RX ORDER — SODIUM CHLORIDE 9 MG/ML
1000 INJECTION, SOLUTION INTRAVENOUS
Refills: 0 | Status: DISCONTINUED | OUTPATIENT
Start: 2020-06-02 | End: 2020-06-02

## 2020-06-02 RX ORDER — SODIUM CHLORIDE 9 MG/ML
1000 INJECTION, SOLUTION INTRAVENOUS
Refills: 0 | Status: DISCONTINUED | OUTPATIENT
Start: 2020-06-02 | End: 2020-06-03

## 2020-06-02 RX ORDER — HYDROMORPHONE HYDROCHLORIDE 2 MG/ML
0.5 INJECTION INTRAMUSCULAR; INTRAVENOUS; SUBCUTANEOUS ONCE
Refills: 0 | Status: DISCONTINUED | OUTPATIENT
Start: 2020-06-02 | End: 2020-06-02

## 2020-06-02 RX ORDER — HYDROMORPHONE HYDROCHLORIDE 2 MG/ML
1 INJECTION INTRAMUSCULAR; INTRAVENOUS; SUBCUTANEOUS
Refills: 0 | Status: DISCONTINUED | OUTPATIENT
Start: 2020-06-02 | End: 2020-06-02

## 2020-06-02 RX ORDER — OXYCODONE AND ACETAMINOPHEN 5; 325 MG/1; MG/1
1 TABLET ORAL EVERY 4 HOURS
Refills: 0 | Status: DISCONTINUED | OUTPATIENT
Start: 2020-06-02 | End: 2020-06-03

## 2020-06-02 RX ORDER — HYDROMORPHONE HYDROCHLORIDE 2 MG/ML
0.5 INJECTION INTRAMUSCULAR; INTRAVENOUS; SUBCUTANEOUS EVERY 4 HOURS
Refills: 0 | Status: DISCONTINUED | OUTPATIENT
Start: 2020-06-02 | End: 2020-06-02

## 2020-06-02 RX ORDER — CHLORHEXIDINE GLUCONATE 213 G/1000ML
1 SOLUTION TOPICAL
Refills: 0 | Status: DISCONTINUED | OUTPATIENT
Start: 2020-06-02 | End: 2020-06-03

## 2020-06-02 RX ORDER — ASPIRIN/CALCIUM CARB/MAGNESIUM 324 MG
81 TABLET ORAL ONCE
Refills: 0 | Status: COMPLETED | OUTPATIENT
Start: 2020-06-02 | End: 2020-06-02

## 2020-06-02 RX ORDER — METOPROLOL TARTRATE 50 MG
25 TABLET ORAL DAILY
Refills: 0 | Status: DISCONTINUED | OUTPATIENT
Start: 2020-06-02 | End: 2020-06-03

## 2020-06-02 RX ORDER — ONDANSETRON 8 MG/1
4 TABLET, FILM COATED ORAL ONCE
Refills: 0 | Status: COMPLETED | OUTPATIENT
Start: 2020-06-02 | End: 2020-06-02

## 2020-06-02 RX ORDER — HYDROMORPHONE HYDROCHLORIDE 2 MG/ML
0.5 INJECTION INTRAMUSCULAR; INTRAVENOUS; SUBCUTANEOUS
Refills: 0 | Status: DISCONTINUED | OUTPATIENT
Start: 2020-06-02 | End: 2020-06-02

## 2020-06-02 RX ADMIN — Medication 25 MILLIGRAM(S): at 05:22

## 2020-06-02 RX ADMIN — SODIUM CHLORIDE 100 MILLILITER(S): 9 INJECTION, SOLUTION INTRAVENOUS at 19:30

## 2020-06-02 RX ADMIN — HYDROMORPHONE HYDROCHLORIDE 0.5 MILLIGRAM(S): 2 INJECTION INTRAMUSCULAR; INTRAVENOUS; SUBCUTANEOUS at 19:52

## 2020-06-02 RX ADMIN — HYDROMORPHONE HYDROCHLORIDE 0.5 MILLIGRAM(S): 2 INJECTION INTRAMUSCULAR; INTRAVENOUS; SUBCUTANEOUS at 23:53

## 2020-06-02 RX ADMIN — Medication 100 GRAM(S): at 05:26

## 2020-06-02 RX ADMIN — OXYCODONE AND ACETAMINOPHEN 1 TABLET(S): 5; 325 TABLET ORAL at 21:40

## 2020-06-02 RX ADMIN — ONDANSETRON 4 MILLIGRAM(S): 8 TABLET, FILM COATED ORAL at 12:58

## 2020-06-02 RX ADMIN — HYDROMORPHONE HYDROCHLORIDE 0.5 MILLIGRAM(S): 2 INJECTION INTRAMUSCULAR; INTRAVENOUS; SUBCUTANEOUS at 13:25

## 2020-06-02 RX ADMIN — SODIUM CHLORIDE 100 MILLILITER(S): 9 INJECTION, SOLUTION INTRAVENOUS at 00:01

## 2020-06-02 RX ADMIN — ATORVASTATIN CALCIUM 80 MILLIGRAM(S): 80 TABLET, FILM COATED ORAL at 21:41

## 2020-06-02 RX ADMIN — CHLORHEXIDINE GLUCONATE 1 APPLICATION(S): 213 SOLUTION TOPICAL at 05:25

## 2020-06-02 RX ADMIN — Medication 81 MILLIGRAM(S): at 21:41

## 2020-06-02 RX ADMIN — HYDROMORPHONE HYDROCHLORIDE 0.5 MILLIGRAM(S): 2 INJECTION INTRAMUSCULAR; INTRAVENOUS; SUBCUTANEOUS at 13:11

## 2020-06-02 NOTE — PRE-ANESTHESIA EVALUATION ADULT - NSANTHPMHFT_GEN_ALL_CORE
Cardiac stent placed september 2019, on Brilinta at home, converted to Integrelin gtt in the hospital, stopped 2 hrs prior the surgery

## 2020-06-02 NOTE — CHART NOTE - NSCHARTNOTEFT_GEN_A_CORE
PACU ANESTHESIA ADMISSION NOTE      Procedure: Laparoscopic cholecystectomy    Post op diagnosis:  H/O acute cholecystitis      ____  Intubated  TV:______       Rate: ______      FiO2: ______    _x___  Patent Airway    _x___  Full return of protective reflexes    _x___  Full recovery from anesthesia / back to baseline status    Vitals:    See anesthesia record      Mental Status:  _x___ Awake   _____ Alert   _____ Drowsy   _____ Sedated    Nausea/Vomiting:  _x___  NO       ______Yes,   See Post - Op Orders         Pain Scale (0-10):  __0___    Treatment: _x___ None    ____ See Post - Op/PCA Orders    Post - Operative Fluids:   __x__ Oral   ____ See Post - Op Orders    Plan: Discharge:   ____Home       __x___Floor     _____Critical Care    _____  Other:_________________    Comments:  No anesthesia issues or complications noted.  Discharge when criteria met.

## 2020-06-02 NOTE — PROGRESS NOTE ADULT - SUBJECTIVE AND OBJECTIVE BOX
VICKY SPENCER  60y Male   [id number]    Hospital Day: 3  Post Operative Day:  Procedure:  Patient is a 60y old  Male who presents for pre-admission for laparoscopic cholecystectomy (2020 09:38)    PAST MEDICAL & SURGICAL HISTORY:  HTN (hypertension)  CAD in native artery: stents 2019  No significant past surgical history      Events of the Last 24h:  Vital Signs Last 24 Hrs  T(C): 36.4 (2020 22:00), Max: 36.4 (2020 06:00)  T(F): 97.5 (2020 22:00), Max: 97.5 (2020 06:00)  HR: 76 (2020 22:00) (13 - 84)  BP: 117/58 (2020 22:00) (99/61 - 117/64)  BP(mean): --  RR: 18 (2020 22:00) (16 - 19)  SpO2: --        Diet, DASH/TLC:   Sodium & Cholesterol Restricted (20 @ 12:34)  Diet, NPO after Midnight:      NPO Start Date: 2020,   NPO Start Time: 23:59 (20 @ 07:59)      I&O's Summary    31 May 2020 07:  -  2020 07:00  --------------------------------------------------------  IN: 176.4 mL / OUT: 1350 mL / NET: -1173.6 mL    2020 07:  -  2020 01:43  --------------------------------------------------------  IN: 2416.4 mL / OUT: 1950 mL / NET: 466.4 mL     I&O's Detail    31 May 2020 07:  -  2020 07:00  --------------------------------------------------------  IN:    eptifibatide Infusion 75 mg/100 mL: 176.4 mL  Total IN: 176.4 mL    OUT:    Voided: 1350 mL  Total OUT: 1350 mL    Total NET: -1173.6 mL      2020 07:01  -  2020 01:43  --------------------------------------------------------  IN:    eptifibatide Infusion 75 mg/100 mL: 176.4 mL    Oral Fluid: 2240 mL  Total IN: 2416.4 mL    OUT:    Voided: 1950 mL  Total OUT: 1950 mL    Total NET: 466.4 mL          MEDICATIONS  (STANDING):  atorvastatin 80 milliGRAM(s) Oral at bedtime  cefoTEtan  IVPB 1 Gram(s) IV Intermittent every 12 hours  cefoTEtan  IVPB      chlorhexidine 4% Liquid 1 Application(s) Topical <User Schedule>  dextrose 5% + sodium chloride 0.45%. 1000 milliLiter(s) (100 mL/Hr) IV Continuous <Continuous>  eptifibatide Infusion 75 mg/100 mL 2 MICROgram(s)/kG/Min (14.7 mL/Hr) IV Continuous <Continuous>  metoprolol succinate ER 25 milliGRAM(s) Oral daily  pantoprazole    Tablet 40 milliGRAM(s) Oral before breakfast    MEDICATIONS  (PRN):      PHYSICAL EXAM:    GENERAL: NAD    HEENT: NCAT    CHEST/LUNGS: CTAB    HEART: RRR,  No murmurs, rubs, or gallops    ABDOMEN: SNTND +BS    EXTREMITIES:  FROM, No clubbing, cyanosis, or edema, palpable pulse    NEURO: No focal neurological deficits    SKIN: No rashes or lesions    INCISION/WOUNDS:                          12.9   4.98  )-----------( 185      ( 2020 00:43 )             39.8        CBC Full  -  ( 2020 00:43 )  WBC Count : 4.98 K/uL  RBC Count : 4.48 M/uL  Hemoglobin : 12.9 g/dL  Hematocrit : 39.8 %  Platelet Count - Automated : 185 K/uL  Mean Cell Volume : 88.8 fL  Mean Cell Hemoglobin : 28.8 pg  Mean Cell Hemoglobin Concentration : 32.4 g/dL  Auto Neutrophil # : x  Auto Lymphocyte # : x  Auto Monocyte # : x  Auto Eosinophil # : x  Auto Basophil # : x  Auto Neutrophil % : x  Auto Lymphocyte % : x  Auto Monocyte % : x  Auto Eosinophil % : x  Auto Basophil % : x               x     |  x     |  x                  Ca: 9.4    BMP:   ----------------------------< x      Mg: x     (20 @ 12:38)             x      |  x     | x                  Ph: x        LFT:     TPro: 6.0 / Alb: 3.9 / TBili: 1.7 / DBili: x / AST: 24 / ALT: 30 / AlkPhos: 104   (20 @ 04:35)    LIVER FUNCTIONS - ( 2020 04:35 )  Alb: 3.9 g/dL / Pro: 6.0 g/dL / ALK PHOS: 104 U/L / ALT: 30 U/L / AST: 24 U/L / GGT: x           PT/INR - ( 2020 00:43 )   PT: 16.10 sec;   INR: 1.40 ratio         PTT - ( 2020 00:43 )  PTT:35.7 sec      Urinalysis Basic - ( 31 May 2020 18:19 )    Color: Light Yellow / Appearance: Clear / S.015 / pH: x  Gluc: x / Ketone: Negative  / Bili: Negative / Urobili: <2 mg/dL   Blood: x / Protein: Negative / Nitrite: Negative   Leuk Esterase: Negative / RBC: x / WBC x   Sq Epi: x / Non Sq Epi: x / Bacteria: x        < from: CT Chest No Cont (20 @ 14:43) >    IMPRESSION:    1.  No CT evidence of acute or intrathoracicpathology.    2.  Stable pulmonary nodules measuring up to 5 mm as above.    3.  Continued follow-up as indicated clinically.        < end of copied text >

## 2020-06-02 NOTE — BRIEF OPERATIVE NOTE - OPERATION/FINDINGS
transected fibrous tract from hx of per. ashlie drain. cystic duct and artery identified. Cystic duct thick and dilated, endo KASIA 30 mm load used to transect. cystic artery clipped and transected. liver bed oozing controlled with electrocautery, argon beam and Nakul

## 2020-06-02 NOTE — CHART NOTE - NSCHARTNOTEFT_GEN_A_CORE
Post Operative Check    Patient is post op from a Laparoscopic cholecystectomy and is doing well post operatively. Patient is tolerating CLD, has no complaints of pain, has not yet ambulated or voided.    Vitals:  T(C): 36.8 (20 @ 14:45), Max: 37 (20 @ 08:14)  HR: 77 (20 @ 14:45) (67 - 86)  BP: 105/61 (20 @ 14:45) (105/61 - 146/85)  RR: 16 (20 @ 14:45) (14 - 18)  SpO2: 96% (20 @ 14:00) (96% - 100%)     @ 07:01  -   @ 07:00  --------------------------------------------------------  IN:    dextrose 5% + sodium chloride 0.45%.: 800 mL    eptifibatide Infusion 75 mg/100 mL: 294 mL    Oral Fluid: 2240 mL  Total IN: 3334 mL    OUT:    Voided: 1950 mL  Total OUT: 1950 mL    Total NET: 1384 mL       @ 07:01  -   @ 16:21  --------------------------------------------------------  IN:  Total IN: 0 mL    OUT:    Voided: 250 mL  Total OUT: 250 mL    Total NET: -250 mL    PHYSICAL EXAM:  GENERAL: NAD, well-appearing  CHEST/LUNG: Clear to auscultation bilaterally  HEART: Regular rate and rhythm  ABDOMEN: Soft, Nontender, Nondistended; dressing over umbilicus c/d/i, post site dressing with small amount of blood staining dressing, no signs of active bleeding  EXTREMITIES:  No clubbing, cyanosis, or edema    Labs:               13.0   4.55  )-----------( 172      ( 2020 05:07 )             38.8           138  |  105  |  15  ----------------------------<  142<H>  3.9   |  24  |  0.8    LFTs:             6.0  | 1.7  | 24       ------------------[104     ( 2020 04:35 )  3.9  | x    | 30          Coags:     15.90  ----< 1.38    ( 2020 05:07 )     38.4      Urinalysis Basic - ( 31 May 2020 18:19 )    Color: Light Yellow / Appearance: Clear / S.015 / pH: x  Gluc: x / Ketone: Negative  / Bili: Negative / Urobili: <2 mg/dL   Blood: x / Protein: Negative / Nitrite: Negative   Leuk Esterase: Negative / RBC: x / WBC x   Sq Epi: x / Non Sq Epi: x / Bacteria: x    Assessment:  Patient is a 60y old Male s/p laparoscopic cholecystectomy    Plan:  - CLD  - monitor incisions for signs of bleeding  - f/u 2330 labs

## 2020-06-02 NOTE — PROGRESS NOTE ADULT - ASSESSMENT
Preopped for laparoscopic cholecystectomy  trend cbc q 4hr   trend coags q4hr  Hold integrelin at 8 am   npo w/ fluids

## 2020-06-02 NOTE — BRIEF OPERATIVE NOTE - NSICDXBRIEFPREOP_GEN_ALL_CORE_FT
PRE-OP DIAGNOSIS:  H/O acute cholecystitis 02-Jun-2020 12:37:14 s/p percutaneous cholecystostomy Pravin Arreola

## 2020-06-03 ENCOUNTER — TRANSCRIPTION ENCOUNTER (OUTPATIENT)
Age: 60
End: 2020-06-03

## 2020-06-03 VITALS
HEART RATE: 89 BPM | RESPIRATION RATE: 18 BRPM | WEIGHT: 218.26 LBS | TEMPERATURE: 98 F | DIASTOLIC BLOOD PRESSURE: 53 MMHG | SYSTOLIC BLOOD PRESSURE: 112 MMHG

## 2020-06-03 LAB
ALBUMIN SERPL ELPH-MCNC: 3.7 G/DL — SIGNIFICANT CHANGE UP (ref 3.5–5.2)
ALP SERPL-CCNC: 108 U/L — SIGNIFICANT CHANGE UP (ref 30–115)
ALT FLD-CCNC: 76 U/L — HIGH (ref 0–41)
ANION GAP SERPL CALC-SCNC: 13 MMOL/L — SIGNIFICANT CHANGE UP (ref 7–14)
AST SERPL-CCNC: 60 U/L — HIGH (ref 0–41)
BASOPHILS # BLD AUTO: 0 K/UL — SIGNIFICANT CHANGE UP (ref 0–0.2)
BASOPHILS NFR BLD AUTO: 0 % — SIGNIFICANT CHANGE UP (ref 0–1)
BILIRUB DIRECT SERPL-MCNC: 0.2 MG/DL — SIGNIFICANT CHANGE UP (ref 0–0.2)
BILIRUB INDIRECT FLD-MCNC: 0.9 MG/DL — SIGNIFICANT CHANGE UP (ref 0.2–1.2)
BILIRUB SERPL-MCNC: 1.1 MG/DL — SIGNIFICANT CHANGE UP (ref 0.2–1.2)
BUN SERPL-MCNC: 11 MG/DL — SIGNIFICANT CHANGE UP (ref 10–20)
CALCIUM SERPL-MCNC: 8.9 MG/DL — SIGNIFICANT CHANGE UP (ref 8.5–10.1)
CHLORIDE SERPL-SCNC: 100 MMOL/L — SIGNIFICANT CHANGE UP (ref 98–110)
CO2 SERPL-SCNC: 25 MMOL/L — SIGNIFICANT CHANGE UP (ref 17–32)
CREAT SERPL-MCNC: 0.7 MG/DL — SIGNIFICANT CHANGE UP (ref 0.7–1.5)
EOSINOPHIL # BLD AUTO: 0 K/UL — SIGNIFICANT CHANGE UP (ref 0–0.7)
EOSINOPHIL NFR BLD AUTO: 0 % — SIGNIFICANT CHANGE UP (ref 0–8)
GLUCOSE SERPL-MCNC: 146 MG/DL — HIGH (ref 70–99)
HCT VFR BLD CALC: 38.1 % — LOW (ref 42–52)
HGB BLD-MCNC: 12.8 G/DL — LOW (ref 14–18)
LYMPHOCYTES # BLD AUTO: 0.25 K/UL — LOW (ref 1.2–3.4)
LYMPHOCYTES # BLD AUTO: 2.6 % — LOW (ref 20.5–51.1)
MAGNESIUM SERPL-MCNC: 1.8 MG/DL — SIGNIFICANT CHANGE UP (ref 1.8–2.4)
MCHC RBC-ENTMCNC: 30.2 PG — SIGNIFICANT CHANGE UP (ref 27–31)
MCHC RBC-ENTMCNC: 33.6 G/DL — SIGNIFICANT CHANGE UP (ref 32–37)
MCV RBC AUTO: 89.9 FL — SIGNIFICANT CHANGE UP (ref 80–94)
METHOD TYPE: SIGNIFICANT CHANGE UP
MONOCYTES # BLD AUTO: 0.25 K/UL — SIGNIFICANT CHANGE UP (ref 0.1–0.6)
MONOCYTES NFR BLD AUTO: 2.6 % — SIGNIFICANT CHANGE UP (ref 1.7–9.3)
NEUTROPHILS # BLD AUTO: 8.87 K/UL — HIGH (ref 1.4–6.5)
NEUTROPHILS NFR BLD AUTO: 92.2 % — HIGH (ref 42.2–75.2)
PHOSPHATE SERPL-MCNC: 3.7 MG/DL — SIGNIFICANT CHANGE UP (ref 2.1–4.9)
PLATELET # BLD AUTO: 184 K/UL — SIGNIFICANT CHANGE UP (ref 130–400)
POTASSIUM SERPL-MCNC: 4.2 MMOL/L — SIGNIFICANT CHANGE UP (ref 3.5–5)
POTASSIUM SERPL-SCNC: 4.2 MMOL/L — SIGNIFICANT CHANGE UP (ref 3.5–5)
PROT SERPL-MCNC: 6.2 G/DL — SIGNIFICANT CHANGE UP (ref 6–8)
RBC # BLD: 4.24 M/UL — LOW (ref 4.7–6.1)
RBC # FLD: 13.1 % — SIGNIFICANT CHANGE UP (ref 11.5–14.5)
SODIUM SERPL-SCNC: 138 MMOL/L — SIGNIFICANT CHANGE UP (ref 135–146)
SURGICAL PATHOLOGY STUDY: SIGNIFICANT CHANGE UP
WBC # BLD: 9.45 K/UL — SIGNIFICANT CHANGE UP (ref 4.8–10.8)
WBC # FLD AUTO: 9.45 K/UL — SIGNIFICANT CHANGE UP (ref 4.8–10.8)

## 2020-06-03 RX ORDER — TICAGRELOR 90 MG/1
180 TABLET ORAL ONCE
Refills: 0 | Status: COMPLETED | OUTPATIENT
Start: 2020-06-03 | End: 2020-06-03

## 2020-06-03 RX ORDER — OXYCODONE HYDROCHLORIDE 5 MG/1
5 TABLET ORAL EVERY 4 HOURS
Refills: 0 | Status: DISCONTINUED | OUTPATIENT
Start: 2020-06-03 | End: 2020-06-03

## 2020-06-03 RX ORDER — MORPHINE SULFATE 50 MG/1
2 CAPSULE, EXTENDED RELEASE ORAL ONCE
Refills: 0 | Status: DISCONTINUED | OUTPATIENT
Start: 2020-06-03 | End: 2020-06-03

## 2020-06-03 RX ORDER — ACETAMINOPHEN 500 MG
2 TABLET ORAL
Qty: 0 | Refills: 0 | DISCHARGE
Start: 2020-06-03

## 2020-06-03 RX ORDER — OXYCODONE HYDROCHLORIDE 5 MG/1
1 TABLET ORAL
Qty: 18 | Refills: 0
Start: 2020-06-03 | End: 2020-06-05

## 2020-06-03 RX ORDER — ACETAMINOPHEN 500 MG
650 TABLET ORAL EVERY 6 HOURS
Refills: 0 | Status: DISCONTINUED | OUTPATIENT
Start: 2020-06-03 | End: 2020-06-03

## 2020-06-03 RX ORDER — SENNA PLUS 8.6 MG/1
2 TABLET ORAL
Qty: 14 | Refills: 0
Start: 2020-06-03 | End: 2020-06-09

## 2020-06-03 RX ADMIN — PANTOPRAZOLE SODIUM 40 MILLIGRAM(S): 20 TABLET, DELAYED RELEASE ORAL at 05:13

## 2020-06-03 RX ADMIN — MORPHINE SULFATE 2 MILLIGRAM(S): 50 CAPSULE, EXTENDED RELEASE ORAL at 08:02

## 2020-06-03 RX ADMIN — CHLORHEXIDINE GLUCONATE 1 APPLICATION(S): 213 SOLUTION TOPICAL at 05:13

## 2020-06-03 RX ADMIN — OXYCODONE AND ACETAMINOPHEN 1 TABLET(S): 5; 325 TABLET ORAL at 05:13

## 2020-06-03 RX ADMIN — Medication 650 MILLIGRAM(S): at 11:19

## 2020-06-03 RX ADMIN — TICAGRELOR 180 MILLIGRAM(S): 90 TABLET ORAL at 08:18

## 2020-06-03 RX ADMIN — Medication 25 MILLIGRAM(S): at 05:13

## 2020-06-03 RX ADMIN — OXYCODONE HYDROCHLORIDE 5 MILLIGRAM(S): 5 TABLET ORAL at 11:38

## 2020-06-03 NOTE — PROGRESS NOTE ADULT - ASSESSMENT
Pain management  Trend hemoglobin   Will give brillinta loading dose today   monitor for bleeding   clear liquid diet  IVF

## 2020-06-03 NOTE — DISCHARGE NOTE PROVIDER - HOSPITAL COURSE
60M w/ PMH of HTN and CAD s/p PCI (9/25/19, Dr. Mcclure) on aspirin and brilinta presented for pre-admission optimization for laparoscopic cholecystectomy. Patient recently underwent drainage at [facility]. On admission patient was started on integrelin drip and monitored with serial coags and cbcs. On HD 3 patient was taken for laparoscopic cholecystectomy , post op course was uncomplicated, POD 0 patient was started on CLD and restarted on home ASA.  POD1 brilinta loading dose was administered and low fat , soft diet was started and tolerated.  Pain was controled, patient is ambulating and tolerating diet with no nausea or vomitng, he is cleared for discharge home today. 60M w/ PMH of HTN and CAD s/p PCI (9/25/19, Dr. Mcclure) on aspirin and brilinta presented for pre-admission optimization for laparoscopic cholecystectomy. Patient recently underwent drainage at [facility]. On admission patient was started on integrelin drip and monitored with serial coags and cbcs. On HD 3 patient was taken for laparoscopic cholecystectomy , post op course was uncomplicated, POD 0 patient was started on CLD and restarted on home ASA.  POD1 brilinta loading dose was administered and low fat , soft diet was started and tolerated.  Pain was controled, patient is ambulating and tolerating diet with no nausea or vomitng, he is cleared for discharge home today.         Patient seen with house staff at 1000 hrs. Alert and stable, tolerating oral feeds, voiding well. Incisional pain controlled with meds has some chest and back pain with deep inspiration which is likely incisional in nature, abdomen soft, dressings are dry and intact, O2 saturation 95-96% at bedside with room air.        Patient is ready for discharge, with return to his normal antiplatelet regimen. Full wound care, diet, and activity instructions were given and he will followup in the office within the next one to 2 weeks.  He will also continue to follow with his cardiologist. The case was also discussed with his PCP, Dr. GOMEZ Morales

## 2020-06-03 NOTE — DISCHARGE NOTE NURSING/CASE MANAGEMENT/SOCIAL WORK - PATIENT PORTAL LINK FT
You can access the FollowMyHealth Patient Portal offered by [facility] by registering at the following website: [email or web address]. By joining [employer]’s FollowMyHealth portal, you will also be able to view your health information using other applications (apps) compatible with our system.

## 2020-06-03 NOTE — DISCHARGE NOTE PROVIDER - NSDCFUADDINST_GEN_ALL_CORE_FT
Patient Name: VICKY SPENCER  MRN: [id number]  60y Male  Location: [address] ([facility] [id number])    Post Operative Instructions  You may remove your dressing in 24-48 hours.  Leave the steri-strips on and shower normally, they will fall off on there own.      Pain medication prescribed:   Aspirin Enteric Coated 81 mg oral delayed release tablet: 1 tab(s) orally once a day MDD:1  oxyCODONE 5 mg oral tablet: 1 tab(s) orally every 4 hours MDD:6     - Please take pain medications prescribed only as needed for severe pain, otherwise you may take Tylenol, 650mg every 6 hours as needed and/or Motrin, 600mg every 6 hours as needed for mild pain control    Additional Instructions:  Please call the clinic and confirm your appointment for follow up, see the follow up instructions and the physician's office number  below. Please call the clinic for any questions or concerns.    06-03-20 @ 08:38

## 2020-06-03 NOTE — DISCHARGE NOTE PROVIDER - CARE PROVIDER_API CALL
VANNESSA DUARTE  General Surgery  [address] [place] [address]  Phone: [phone]  Fax: [phone]  Follow Up Time:

## 2020-06-03 NOTE — DISCHARGE NOTE PROVIDER - NSDCMRMEDTOKEN_GEN_ALL_CORE_FT
acetaminophen 325 mg oral tablet: 2 tab(s) orally every 6 hours  Aspirin Enteric Coated 81 mg oral delayed release tablet: 1 tab(s) orally once a day MDD:1  atorvastatin 80 mg oral tablet: 1 tab(s) orally once a day (at bedtime)  Brilinta (ticagrelor) 90 mg oral tablet: 1 tab(s) orally 2 times a day MDD:2  metoprolol succinate 25 mg oral tablet, extended release: 1 tab(s) orally once a day  oxyCODONE 5 mg oral tablet: 1 tab(s) orally every 4 hours MDD:6  senna oral tablet: 2 tab(s) orally once a day MDD:2

## 2020-06-03 NOTE — PROGRESS NOTE ADULT - SUBJECTIVE AND OBJECTIVE BOX
VICKY SPENCER  60y Male   [id number]    Hospital Day: 4  Post Operative Day:1  Procedure:s/p laparoscopic cholecystectomy  Patient is a 60y old  Male who presents with a chief complaint of pre-admission for laparoscopic cholecystectomy (02 Jun 2020 01:43)    PAST MEDICAL & SURGICAL HISTORY:  HTN (hypertension)  CAD in native artery: stents 9/2019  No significant past surgical history      Events of the Last 24h:  Vital Signs Last 24 Hrs  T(C): 35.6 (02 Jun 2020 20:42), Max: 37 (02 Jun 2020 08:14)  T(F): 96.1 (02 Jun 2020 20:42), Max: 98.6 (02 Jun 2020 08:14)  HR: 85 (02 Jun 2020 20:42) (67 - 86)  BP: 109/70 (02 Jun 2020 20:42) (105/61 - 146/85)  BP(mean): --  RR: 18 (02 Jun 2020 20:42) (14 - 18)  SpO2: 95% (02 Jun 2020 19:30) (95% - 100%)        Diet, Clear Liquid (06-02-20 @ 12:55)      I&O's Summary    01 Jun 2020 07:01  -  02 Jun 2020 07:00  --------------------------------------------------------  IN: 3334 mL / OUT: 1950 mL / NET: 1384 mL    02 Jun 2020 07:01  -  03 Jun 2020 00:33  --------------------------------------------------------  IN: 400 mL / OUT: 550 mL / NET: -150 mL     I&O's Detail    01 Jun 2020 07:01  -  02 Jun 2020 07:00  --------------------------------------------------------  IN:    dextrose 5% + sodium chloride 0.45%.: 800 mL    eptifibatide Infusion 75 mg/100 mL: 294 mL    Oral Fluid: 2240 mL  Total IN: 3334 mL    OUT:    Voided: 1950 mL  Total OUT: 1950 mL    Total NET: 1384 mL      02 Jun 2020 07:01  -  03 Jun 2020 00:33  --------------------------------------------------------  IN:    lactated ringers.: 400 mL  Total IN: 400 mL    OUT:    Voided: 550 mL  Total OUT: 550 mL    Total NET: -150 mL          MEDICATIONS  (STANDING):  atorvastatin 80 milliGRAM(s) Oral at bedtime  chlorhexidine 4% Liquid 1 Application(s) Topical <User Schedule>  lactated ringers. 1000 milliLiter(s) (100 mL/Hr) IV Continuous <Continuous>  metoprolol succinate ER 25 milliGRAM(s) Oral daily  pantoprazole    Tablet 40 milliGRAM(s) Oral before breakfast    MEDICATIONS  (PRN):  oxycodone    5 mG/acetaminophen 325 mG 1 Tablet(s) Oral every 4 hours PRN Moderate Pain (4 - 6)      PHYSICAL EXAM:    GENERAL: NAD    HEENT: NCAT    CHEST/LUNGS: CTAB    HEART: RRR,  No murmurs, rubs, or gallops    ABDOMEN: SNTND +BS, incision clean dry intact , minimal blood right sided port incision , no bleeding no hematoma     EXTREMITIES:  FROM, No clubbing, cyanosis, or edema, palpable pulse    NEURO: No focal neurological deficits    SKIN: No rashes or lesions    INCISION/WOUNDS:                          13.0   4.55  )-----------( 172      ( 02 Jun 2020 05:07 )             38.8        CBC Full  -  ( 02 Jun 2020 05:07 )  WBC Count : 4.55 K/uL  RBC Count : 4.32 M/uL  Hemoglobin : 13.0 g/dL  Hematocrit : 38.8 %  Platelet Count - Automated : 172 K/uL  Mean Cell Volume : 89.8 fL  Mean Cell Hemoglobin : 30.1 pg  Mean Cell Hemoglobin Concentration : 33.5 g/dL  Auto Neutrophil # : x  Auto Lymphocyte # : x  Auto Monocyte # : x  Auto Eosinophil # : x  Auto Basophil # : x  Auto Neutrophil % : x  Auto Lymphocyte % : x  Auto Monocyte % : x  Auto Eosinophil % : x  Auto Basophil % : x               x     |  x     |  x                  Ca: 9.4    BMP:   ----------------------------< x      Mg: x     (06-01-20 @ 12:38)             x      |  x     | x                  Ph: x        LFT:     TPro: 6.0 / Alb: 3.9 / TBili: 1.7 / DBili: x / AST: 24 / ALT: 30 / AlkPhos: 104   (06-01-20 @ 04:35)    LIVER FUNCTIONS - ( 01 Jun 2020 04:35 )  Alb: 3.9 g/dL / Pro: 6.0 g/dL / ALK PHOS: 104 U/L / ALT: 30 U/L / AST: 24 U/L / GGT: x           PT/INR - ( 02 Jun 2020 05:07 )   PT: 15.90 sec;   INR: 1.38 ratio         PTT - ( 02 Jun 2020 05:07 )  PTT:38.4 sec          Culture - Body Fluid with Gram Stain (collected 02 Jun 2020 14:00)  Source: .Body Fluid GALLBLADDER FLUID  Gram Stain (02 Jun 2020 23:15):    No polymorphonuclear cells seen    Gram Positive Cocci in Pairs and Chains seen    by cytocentrifuge

## 2020-06-03 NOTE — PROGRESS NOTE ADULT - REASON FOR ADMISSION
pre-admission for laparoscopic cholecystectomy

## 2020-06-04 ENCOUNTER — EMERGENCY (EMERGENCY)
Facility: HOSPITAL | Age: 60
LOS: 0 days | Discharge: HOME | End: 2020-06-05
Attending: EMERGENCY MEDICINE | Admitting: EMERGENCY MEDICINE
Payer: COMMERCIAL

## 2020-06-04 VITALS
WEIGHT: 203.05 LBS | HEART RATE: 108 BPM | DIASTOLIC BLOOD PRESSURE: 62 MMHG | RESPIRATION RATE: 20 BRPM | SYSTOLIC BLOOD PRESSURE: 138 MMHG | HEIGHT: 73 IN | TEMPERATURE: 100 F

## 2020-06-04 VITALS
HEART RATE: 92 BPM | SYSTOLIC BLOOD PRESSURE: 133 MMHG | DIASTOLIC BLOOD PRESSURE: 67 MMHG | OXYGEN SATURATION: 98 % | RESPIRATION RATE: 20 BRPM

## 2020-06-04 DIAGNOSIS — Z79.891 LONG TERM (CURRENT) USE OF OPIATE ANALGESIC: ICD-10-CM

## 2020-06-04 DIAGNOSIS — R50.9 FEVER, UNSPECIFIED: ICD-10-CM

## 2020-06-04 DIAGNOSIS — Z79.82 LONG TERM (CURRENT) USE OF ASPIRIN: ICD-10-CM

## 2020-06-04 DIAGNOSIS — E78.5 HYPERLIPIDEMIA, UNSPECIFIED: ICD-10-CM

## 2020-06-04 DIAGNOSIS — Z20.828 CONTACT WITH AND (SUSPECTED) EXPOSURE TO OTHER VIRAL COMMUNICABLE DISEASES: ICD-10-CM

## 2020-06-04 DIAGNOSIS — Z90.49 ACQUIRED ABSENCE OF OTHER SPECIFIED PARTS OF DIGESTIVE TRACT: ICD-10-CM

## 2020-06-04 DIAGNOSIS — Z79.899 OTHER LONG TERM (CURRENT) DRUG THERAPY: ICD-10-CM

## 2020-06-04 DIAGNOSIS — I10 ESSENTIAL (PRIMARY) HYPERTENSION: ICD-10-CM

## 2020-06-04 DIAGNOSIS — R10.13 EPIGASTRIC PAIN: ICD-10-CM

## 2020-06-04 DIAGNOSIS — I25.10 ATHEROSCLEROTIC HEART DISEASE OF NATIVE CORONARY ARTERY WITHOUT ANGINA PECTORIS: ICD-10-CM

## 2020-06-04 DIAGNOSIS — R10.33 PERIUMBILICAL PAIN: ICD-10-CM

## 2020-06-04 LAB
-  AMPICILLIN: SIGNIFICANT CHANGE UP
-  TETRACYCLINE: SIGNIFICANT CHANGE UP
-  VANCOMYCIN: SIGNIFICANT CHANGE UP
ALBUMIN SERPL ELPH-MCNC: 3.8 G/DL — SIGNIFICANT CHANGE UP (ref 3.5–5.2)
ALP SERPL-CCNC: 98 U/L — SIGNIFICANT CHANGE UP (ref 30–115)
ALT FLD-CCNC: 44 U/L — HIGH (ref 0–41)
ANION GAP SERPL CALC-SCNC: 13 MMOL/L — SIGNIFICANT CHANGE UP (ref 7–14)
APPEARANCE UR: CLEAR — SIGNIFICANT CHANGE UP
APTT BLD: 33.9 SEC — SIGNIFICANT CHANGE UP (ref 27–39.2)
AST SERPL-CCNC: 26 U/L — SIGNIFICANT CHANGE UP (ref 0–41)
BASOPHILS # BLD AUTO: 0.02 K/UL — SIGNIFICANT CHANGE UP (ref 0–0.2)
BASOPHILS NFR BLD AUTO: 0.2 % — SIGNIFICANT CHANGE UP (ref 0–1)
BILIRUB DIRECT SERPL-MCNC: 0.2 MG/DL — SIGNIFICANT CHANGE UP (ref 0–0.2)
BILIRUB INDIRECT FLD-MCNC: 1.8 MG/DL — HIGH (ref 0.2–1.2)
BILIRUB SERPL-MCNC: 2 MG/DL — HIGH (ref 0.2–1.2)
BILIRUB UR-MCNC: NEGATIVE — SIGNIFICANT CHANGE UP
BUN SERPL-MCNC: 10 MG/DL — SIGNIFICANT CHANGE UP (ref 10–20)
CALCIUM SERPL-MCNC: 8.8 MG/DL — SIGNIFICANT CHANGE UP (ref 8.5–10.1)
CHLORIDE SERPL-SCNC: 99 MMOL/L — SIGNIFICANT CHANGE UP (ref 98–110)
CO2 SERPL-SCNC: 26 MMOL/L — SIGNIFICANT CHANGE UP (ref 17–32)
COLOR SPEC: SIGNIFICANT CHANGE UP
CREAT SERPL-MCNC: 0.9 MG/DL — SIGNIFICANT CHANGE UP (ref 0.7–1.5)
DIFF PNL FLD: NEGATIVE — SIGNIFICANT CHANGE UP
EOSINOPHIL # BLD AUTO: 0.06 K/UL — SIGNIFICANT CHANGE UP (ref 0–0.7)
EOSINOPHIL NFR BLD AUTO: 0.6 % — SIGNIFICANT CHANGE UP (ref 0–8)
GLUCOSE SERPL-MCNC: 105 MG/DL — HIGH (ref 70–99)
GLUCOSE UR QL: NEGATIVE — SIGNIFICANT CHANGE UP
HCT VFR BLD CALC: 36.3 % — LOW (ref 42–52)
HGB BLD-MCNC: 12.3 G/DL — LOW (ref 14–18)
IMM GRANULOCYTES NFR BLD AUTO: 0.5 % — HIGH (ref 0.1–0.3)
INR BLD: 1.48 RATIO — HIGH (ref 0.65–1.3)
KETONES UR-MCNC: NEGATIVE — SIGNIFICANT CHANGE UP
LACTATE SERPL-SCNC: 1.2 MMOL/L — SIGNIFICANT CHANGE UP (ref 0.7–2)
LEUKOCYTE ESTERASE UR-ACNC: NEGATIVE — SIGNIFICANT CHANGE UP
LIDOCAIN IGE QN: 14 U/L — SIGNIFICANT CHANGE UP (ref 7–60)
LYMPHOCYTES # BLD AUTO: 1.68 K/UL — SIGNIFICANT CHANGE UP (ref 1.2–3.4)
LYMPHOCYTES # BLD AUTO: 16.7 % — LOW (ref 20.5–51.1)
MCHC RBC-ENTMCNC: 30.3 PG — SIGNIFICANT CHANGE UP (ref 27–31)
MCHC RBC-ENTMCNC: 33.9 G/DL — SIGNIFICANT CHANGE UP (ref 32–37)
MCV RBC AUTO: 89.4 FL — SIGNIFICANT CHANGE UP (ref 80–94)
MONOCYTES # BLD AUTO: 1.06 K/UL — HIGH (ref 0.1–0.6)
MONOCYTES NFR BLD AUTO: 10.6 % — HIGH (ref 1.7–9.3)
NEUTROPHILS # BLD AUTO: 7.16 K/UL — HIGH (ref 1.4–6.5)
NEUTROPHILS NFR BLD AUTO: 71.4 % — SIGNIFICANT CHANGE UP (ref 42.2–75.2)
NITRITE UR-MCNC: NEGATIVE — SIGNIFICANT CHANGE UP
NRBC # BLD: 0 /100 WBCS — SIGNIFICANT CHANGE UP (ref 0–0)
PH UR: 6.5 — SIGNIFICANT CHANGE UP (ref 5–8)
PLATELET # BLD AUTO: 184 K/UL — SIGNIFICANT CHANGE UP (ref 130–400)
POTASSIUM SERPL-MCNC: 3.7 MMOL/L — SIGNIFICANT CHANGE UP (ref 3.5–5)
POTASSIUM SERPL-SCNC: 3.7 MMOL/L — SIGNIFICANT CHANGE UP (ref 3.5–5)
PROT SERPL-MCNC: 6.3 G/DL — SIGNIFICANT CHANGE UP (ref 6–8)
PROT UR-MCNC: NEGATIVE — SIGNIFICANT CHANGE UP
PROTHROM AB SERPL-ACNC: 17 SEC — HIGH (ref 9.95–12.87)
RBC # BLD: 4.06 M/UL — LOW (ref 4.7–6.1)
RBC # FLD: 13.2 % — SIGNIFICANT CHANGE UP (ref 11.5–14.5)
SODIUM SERPL-SCNC: 138 MMOL/L — SIGNIFICANT CHANGE UP (ref 135–146)
SP GR SPEC: 1.01 — LOW (ref 1.01–1.02)
UROBILINOGEN FLD QL: SIGNIFICANT CHANGE UP
WBC # BLD: 10.03 K/UL — SIGNIFICANT CHANGE UP (ref 4.8–10.8)
WBC # FLD AUTO: 10.03 K/UL — SIGNIFICANT CHANGE UP (ref 4.8–10.8)

## 2020-06-04 PROCEDURE — 99284 EMERGENCY DEPT VISIT MOD MDM: CPT | Mod: CR

## 2020-06-04 PROCEDURE — 71045 X-RAY EXAM CHEST 1 VIEW: CPT | Mod: 26

## 2020-06-04 RX ORDER — SODIUM CHLORIDE 9 MG/ML
1000 INJECTION INTRAMUSCULAR; INTRAVENOUS; SUBCUTANEOUS ONCE
Refills: 0 | Status: COMPLETED | OUTPATIENT
Start: 2020-06-04 | End: 2020-06-04

## 2020-06-04 RX ORDER — MORPHINE SULFATE 50 MG/1
4 CAPSULE, EXTENDED RELEASE ORAL ONCE
Refills: 0 | Status: DISCONTINUED | OUTPATIENT
Start: 2020-06-04 | End: 2020-06-04

## 2020-06-04 RX ORDER — ACETAMINOPHEN 500 MG
650 TABLET ORAL ONCE
Refills: 0 | Status: COMPLETED | OUTPATIENT
Start: 2020-06-04 | End: 2020-06-04

## 2020-06-04 RX ADMIN — SODIUM CHLORIDE 1000 MILLILITER(S): 9 INJECTION INTRAMUSCULAR; INTRAVENOUS; SUBCUTANEOUS at 22:00

## 2020-06-04 NOTE — ED PROVIDER NOTE - CARE PROVIDER_API CALL
VANNESSA DUARTE  General Surgery  [address] [place] [address]  Phone: [phone]  Fax: [phone]  Follow Up Time: 1-3 Days

## 2020-06-04 NOTE — ED PROVIDER NOTE - PROGRESS NOTE DETAILS
Pt seen and evaluated by surgery, recommend no further workup at this time. State to DC pt with incentive spirometer. Pt to follow-up with Dr. Avalos. Pt comfortable with plan, will dc.

## 2020-06-04 NOTE — ED PROVIDER NOTE - PATIENT PORTAL LINK FT
You can access the FollowMyHealth Patient Portal offered by [facility] by registering at the following website: [email or web address]. By joining [employer]’s FollowMyHealth portal, you will also be able to view your health information using other applications (apps) compatible with our system.

## 2020-06-04 NOTE — ED ADULT NURSE NOTE - OBJECTIVE STATEMENT
The patient is a 60y Male complaining of fever. pt reports temperature of 102.8 the yesterday night with chills, s/p cholecystectomy on June 2nd and was d/c'd to home yesterday. pt takes tylenol for fever, and is on percocet for abd. pain, med hx of CAD, surg hx of stents placed last yr

## 2020-06-04 NOTE — ED PROVIDER NOTE - PHYSICAL EXAMINATION
VITAL SIGNS: I have reviewed nursing notes and confirm.  CONSTITUTIONAL: Well-developed; well-nourished; in no acute distress.  SKIN: Skin exam is warm and dry, no acute rash.  HEAD: Normocephalic; atraumatic.  EYES: Conjunctiva and sclera clear.  ENT: No nasal discharge; airway clear.   CARD: S1, S2 normal; no murmurs, gallops, or rubs. Regular rate and rhythm.  RESP: No wheezes, rales or rhonchi. Speaking in full sentences.   ABD: Normal bowel sounds; soft; non-distended; (+) mild epigastric and periumbilical TTP; No rebound or guarding. No CVA tenderness. (+) surgical sites clean/dry/intact.  EXT: Normal ROM. No clubbing, cyanosis or edema. No calf TTP or swelling.   NEURO: Alert, oriented. Grossly unremarkable. No focal deficits.

## 2020-06-04 NOTE — ED ADULT NURSE NOTE - NSIMPLEMENTINTERV_GEN_ALL_ED
Implemented All Fall with Harm Risk Interventions:  [place] to call system. Call bell, personal items and telephone within reach. Instruct patient to call for assistance. Room bathroom lighting operational. Non-slip footwear when patient is off stretcher. Physically safe environment: no spills, clutter or unnecessary equipment. Stretcher in lowest position, wheels locked, appropriate side rails in place. Provide visual cue, wrist band, yellow gown, etc. Monitor gait and stability. Monitor for mental status changes and reorient to person, place, and time. Review medications for side effects contributing to fall risk. Reinforce activity limits and safety measures with patient and family. Provide visual clues: red socks.

## 2020-06-04 NOTE — CONSULT NOTE ADULT - SUBJECTIVE AND OBJECTIVE BOX
VICKY SPENCER [id number]  60y Male    HPI: 60M PMH CAD w/ stents 9/19 on brillinta, HTN, s/p lap ashlie 6/2 presenting to the ED with chief complaint of fevers to 102 at home managed with tylenol. He denies nausea, vomiting, inability to tolerate PO. He is able to ambulate comfortably and is voiding without issue. He reports not having recently had a bowel movement. He continues to have post-surgical pain requiring 2 tylenol q6h and oxycodone q4h. On initial evaluation he appears comfortable albeit slightly diaphoretic.       PAST MEDICAL & SURGICAL HISTORY:  HTN (hypertension)  CAD in native artery: stents 9/2019  No significant past surgical history        MEDICATIONS  (STANDING):    MEDICATIONS  (PRN):      Allergies    No Known Allergies    Intolerances        REVIEW OF SYSTEMS    [x ] A ten-point review of systems was otherwise negative except as noted.  [ ] Due to altered mental status/intubation, subjective information were not able to be obtained from the patient. History was obtained, to the extent possible, from review of the chart and collateral sources of information.      Vital Signs Last 24 Hrs  T(C): 37.8 (04 Jun 2020 21:04), Max: 37.8 (04 Jun 2020 21:04)  T(F): 100 (04 Jun 2020 21:04), Max: 100 (04 Jun 2020 21:04)  HR: 92 (04 Jun 2020 21:21) (92 - 108)  BP: 133/67 (04 Jun 2020 21:21) (133/67 - 138/62)  BP(mean): --  RR: 20 (04 Jun 2020 21:21) (20 - 20)  SpO2: 98% (04 Jun 2020 21:21) (98% - 98%)    PHYSICAL EXAM:  GENERAL: NAD, well-appearing, diaphoretic  CHEST/LUNG: Clear to auscultation bilaterally  HEART: Regular rate and rhythm  ABDOMEN: Soft, janelle-incisional tenderness, mildly distended, surgical sites c/d/i;         LABS:  Labs:  CAPILLARY BLOOD GLUCOSE                              12.3   10.03 )-----------( 184      ( 04 Jun 2020 22:12 )             36.3       Auto Neutrophil %: 71.4 % (06-04-20 @ 22:12)  Auto Immature Granulocyte %: 0.5 % (06-04-20 @ 22:12)    06-03    138  |  100  |  11  ----------------------------<  146<H>  4.2   |  25  |  0.7          LFTs:             6.2  | 1.1  | 60       ------------------[108     ( 03 Jun 2020 00:21 )  3.7  | 0.2  | 76            Culture - Body Fluid with Gram Stain (collected 02 Jun 2020 14:00)  Source: .Body Fluid GALLBLADDER FLUID  Gram Stain (02 Jun 2020 23:15):    No polymorphonuclear cells seen    Gram Positive Cocci in Pairs and Chains seen    by cytocentrifuge  Preliminary Report (03 Jun 2020 17:44):    Numerous Enterococcus faecalis  Organism: Enterococcus faecalis (03 Jun 2020 17:43)  Organism: Enterococcus faecalis (03 Jun 2020 17:43)          RADIOLOGY & ADDITIONAL STUDIES:

## 2020-06-04 NOTE — ED PROVIDER NOTE - ATTENDING CONTRIBUTION TO CARE
59yo M history of CAD PCI nephrolithiasis prior hernia repairs, recent lap ashlie 6/2 with Dr. Avalos presenting with fever since today. Per pt, mild abdominal pain, periumbilical. No nausea/vomiting/diarrhea. Per pt, hasn't passed flatus since procedure. No cough, dysuria/hematuria.  Constitutional: Well appearing. No acute distress. Non toxic.   Eyes: PERRLA. Extraocular movements intact, no entrapment. Conjunctiva normal.   ENT: No nasal discharge. Moist mucus membranes.  Neck: Supple, non tender, full range of motion.  CV: RRR no murmurs, rubs, or gallops. +S1S2.   Pulm: Clear to auscultation bilaterally. Normal work of breathing.  Abd: soft incision sites c/d/i epigastric and periumbilical ttp no rebound no guarding ND +BS.   Ext: Warm and well perfused x4, moving all extremities, no edema.   Psy: Cooperative, appropriate.   Skin: Warm, dry, no rash  Neuro: CN2-12 grossly intact no sensory or motor deficits throughout, no drift, no ataxia

## 2020-06-04 NOTE — CONSULT NOTE ADULT - ASSESSMENT
60M PMH CAD w/ stents 9/19 on brillinta, HTN, s/p lap ashlie 6/2 presenting to the ED with chief complaint of fevers to 102 at home managed with tylenol.     Plan:  - please send full set of labs with blood cultures  - obtain CXR  - further testing pending lab results  - plan discussed with and agreed upon by Dr. Avalos 60M PMH CAD w/ stents 9/19 on brillinta, HTN, s/p lap ashlie 6/2 presenting to the ED with chief complaint of fevers to 102 at home managed with tylenol.     Plan:  - please send full set of labs with blood cultures  - obtain CXR  - labs and imaging reviewed, patient cleared for discharge with incentive spirometry  - encourage patient to limit pain med intake frequency and to use stool softeners  - patient should follow up with Dr. Avalos as previously discussed  - plan discussed with and agreed upon by Dr. Avalos

## 2020-06-04 NOTE — ED ADULT TRIAGE NOTE - CHIEF COMPLAINT QUOTE
" pt reports fever  of 102.8 and chills, s/p cholecystectomy in june 2 and was d/c yesterday to home"

## 2020-06-04 NOTE — ED PROVIDER NOTE - CLINICAL SUMMARY MEDICAL DECISION MAKING FREE TEXT BOX
61yo M history of CAD PCI nephrolithiasis prior hernia repairs, recent lap ashlie 6/2 with Dr. Avalos presenting with fever since today. Per pt, mild abdominal pain, periumbilical. No nausea/vomiting/diarrhea. Per pt, hasn't passed flatus since procedure. No cough, dysuria/hematuria. labs imaging reviewed. sp surgery eval 61yo M history of CAD PCI nephrolithiasis prior hernia repairs, recent lap ashlie 6/2 with Dr. Avalos presenting with fever since today. Per pt, mild abdominal pain, periumbilical. No nausea/vomiting/diarrhea. Per pt, hasn't passed flatus since procedure. No cough, dysuria/hematuria. labs imaging reviewed. sp surgery eval and discussed with Dr. Avalos, recs no CT, no abx, recs discharge with incentive spirometer and outpatient follow-up

## 2020-06-04 NOTE — ED PROVIDER NOTE - OBJECTIVE STATEMENT
59 yo M with PMHx of HTN, HLD, kidney stones, CAD, and recent lap ashlie 6/2 with Dr. Avalos presents to the ED c/o fever that started last night and has been persisting. Pt states tmax was 102F and has been improving with tylenol. He admits to mild soreness around incision sites, otherwise he denies other complaints. Pt denies nausea, vomiting, abdominal pain, diarrhea, headache, dizziness, weakness, chest pain, SOB, back pain, LOC, trauma, urinary symptoms, cough, calf pain/swelling.

## 2020-06-04 NOTE — ED PROVIDER NOTE - NS ED ROS FT
Review of Systems  Constitutional:  (+) fever, chills.  Eyes:  No visual changes, eye pain, or discharge.  ENMT:  No hearing changes, pain, or discharge. No nasal congestion, discharge, or bleeding. No throat pain, swelling, or difficulty swallowing.  Cardiac:  No chest pain, palpitations, syncope, or edema.  Respiratory:  No dyspnea, cough. No hemoptysis.  GI:  No nausea, vomiting, diarrhea, abdominal pain.   :  No dysuria, hematuria, frequency, or burning.   MS:  No back pain.  Skin:  No skin rash, pruritis, jaundice, or lesions.  Neuro:  No headache, dizziness, loss of sensation, or focal weakness.  No change in mental status.

## 2020-06-05 DIAGNOSIS — I25.10 ATHEROSCLEROTIC HEART DISEASE OF NATIVE CORONARY ARTERY WITHOUT ANGINA PECTORIS: ICD-10-CM

## 2020-06-05 DIAGNOSIS — K80.10 CALCULUS OF GALLBLADDER WITH CHRONIC CHOLECYSTITIS WITHOUT OBSTRUCTION: ICD-10-CM

## 2020-06-05 DIAGNOSIS — K66.0 PERITONEAL ADHESIONS (POSTPROCEDURAL) (POSTINFECTION): ICD-10-CM

## 2020-06-05 DIAGNOSIS — I25.2 OLD MYOCARDIAL INFARCTION: ICD-10-CM

## 2020-06-05 DIAGNOSIS — I11.9 HYPERTENSIVE HEART DISEASE WITHOUT HEART FAILURE: ICD-10-CM

## 2020-06-05 DIAGNOSIS — Z95.5 PRESENCE OF CORONARY ANGIOPLASTY IMPLANT AND GRAFT: ICD-10-CM

## 2020-06-05 LAB — SARS-COV-2 RNA SPEC QL NAA+PROBE: SIGNIFICANT CHANGE UP

## 2020-06-05 RX ORDER — KETOROLAC TROMETHAMINE 30 MG/ML
15 SYRINGE (ML) INJECTION ONCE
Refills: 0 | Status: COMPLETED | OUTPATIENT
Start: 2020-06-05 | End: 2020-06-05

## 2020-06-06 LAB
CULTURE RESULTS: NO GROWTH — SIGNIFICANT CHANGE UP
SPECIMEN SOURCE: SIGNIFICANT CHANGE UP

## 2020-06-07 LAB
CULTURE RESULTS: SIGNIFICANT CHANGE UP
ORGANISM # SPEC MICROSCOPIC CNT: SIGNIFICANT CHANGE UP
ORGANISM # SPEC MICROSCOPIC CNT: SIGNIFICANT CHANGE UP
SPECIMEN SOURCE: SIGNIFICANT CHANGE UP

## 2020-06-18 ENCOUNTER — APPOINTMENT (OUTPATIENT)
Dept: SURGERY | Facility: CLINIC | Age: 60
End: 2020-06-18
Payer: COMMERCIAL

## 2020-06-18 VITALS
TEMPERATURE: 97.7 F | HEIGHT: 73 IN | WEIGHT: 196 LBS | BODY MASS INDEX: 25.98 KG/M2 | HEART RATE: 82 BPM | SYSTOLIC BLOOD PRESSURE: 112 MMHG | DIASTOLIC BLOOD PRESSURE: 82 MMHG

## 2020-06-18 DIAGNOSIS — K81.0 ACUTE CHOLECYSTITIS: ICD-10-CM

## 2020-06-18 PROCEDURE — 99024 POSTOP FOLLOW-UP VISIT: CPT

## 2020-06-18 NOTE — HISTORY OF PRESENT ILLNESS
[de-identified] : the patient reports no abdominal pain or other symptoms, denies fevers and chills\par tolerating regular diet but avoiding spicy foods per instructions\par no wound complaints, abd s, nt, nd, wounds with no erythema or exudates\par routine recovery after cholecystectomy\par rto prn

## 2021-04-20 NOTE — PRE-OP CHECKLIST - ALLERGY BAND ON
Impression: Vitreous hemorrhage, left eye: H43.12. Plan: symptomatic x 5 days. likely 2/2 PDR OS. pt denies photopsia.  
retina consult 1-2 weeks or prn no known allergies

## 2021-06-10 NOTE — ED ADULT TRIAGE NOTE - PAIN RATING/NUMBER SCALE (0-10): REST
Caller: Jina Riley    Relationship: Emergency Contact    Best call back number: [phone]     Medication needed:   Requested Prescriptions     Pending Prescriptions Disp Refills   • HYDROcodone-acetaminophen (NORCO) 7.5-325 MG per tablet 60 tablet 0     Sig: Take 1 tablet by mouth 2 (Two) Times a Day.       When do you need the refill by: ASAP    What additional details did the patient provide when requesting the medication: PATIENT'S WIFE STATED PATIENT HAS 1 DAY LEFT    Does the patient have less than a 3 day supply:  [] Yes  [] No    What is the patient's preferred pharmacy: [address] [phone] [facility] [phone]               7

## 2021-07-28 ENCOUNTER — INPATIENT (INPATIENT)
Facility: HOSPITAL | Age: 61
LOS: 5 days | Discharge: HOME | End: 2021-08-03
Attending: INTERNAL MEDICINE | Admitting: INTERNAL MEDICINE
Payer: COMMERCIAL

## 2021-07-28 VITALS
TEMPERATURE: 103 F | WEIGHT: 220.02 LBS | HEIGHT: 73 IN | DIASTOLIC BLOOD PRESSURE: 82 MMHG | SYSTOLIC BLOOD PRESSURE: 126 MMHG | HEART RATE: 152 BPM | OXYGEN SATURATION: 97 %

## 2021-07-28 LAB
ALBUMIN SERPL ELPH-MCNC: 4.5 G/DL — SIGNIFICANT CHANGE UP (ref 3.5–5.2)
ALP SERPL-CCNC: 307 U/L — HIGH (ref 30–115)
ALT FLD-CCNC: 470 U/L — HIGH (ref 0–41)
ANION GAP SERPL CALC-SCNC: 13 MMOL/L — SIGNIFICANT CHANGE UP (ref 7–14)
APTT BLD: 27.5 SEC — SIGNIFICANT CHANGE UP (ref 27–39.2)
AST SERPL-CCNC: 493 U/L — HIGH (ref 0–41)
BASE EXCESS BLDV CALC-SCNC: 1.9 MMOL/L — SIGNIFICANT CHANGE UP (ref -2–2)
BASOPHILS # BLD AUTO: 0.01 K/UL — SIGNIFICANT CHANGE UP (ref 0–0.2)
BASOPHILS NFR BLD AUTO: 0.2 % — SIGNIFICANT CHANGE UP (ref 0–1)
BILIRUB SERPL-MCNC: 4.7 MG/DL — HIGH (ref 0.2–1.2)
BUN SERPL-MCNC: 18 MG/DL — SIGNIFICANT CHANGE UP (ref 10–20)
CA-I SERPL-SCNC: 1.16 MMOL/L — SIGNIFICANT CHANGE UP (ref 1.12–1.3)
CALCIUM SERPL-MCNC: 9.4 MG/DL — SIGNIFICANT CHANGE UP (ref 8.5–10.1)
CHLORIDE SERPL-SCNC: 101 MMOL/L — SIGNIFICANT CHANGE UP (ref 98–110)
CO2 SERPL-SCNC: 22 MMOL/L — SIGNIFICANT CHANGE UP (ref 17–32)
CREAT SERPL-MCNC: 1.1 MG/DL — SIGNIFICANT CHANGE UP (ref 0.7–1.5)
EOSINOPHIL # BLD AUTO: 0.01 K/UL — SIGNIFICANT CHANGE UP (ref 0–0.7)
EOSINOPHIL NFR BLD AUTO: 0.2 % — SIGNIFICANT CHANGE UP (ref 0–8)
GAS PNL BLDV: 139 MMOL/L — SIGNIFICANT CHANGE UP (ref 136–145)
GAS PNL BLDV: SIGNIFICANT CHANGE UP
GLUCOSE SERPL-MCNC: 124 MG/DL — HIGH (ref 70–99)
HCO3 BLDV-SCNC: 25 MMOL/L — SIGNIFICANT CHANGE UP (ref 22–29)
HCT VFR BLD CALC: 47.9 % — SIGNIFICANT CHANGE UP (ref 42–52)
HCT VFR BLDA CALC: 51.1 % — HIGH (ref 34–44)
HGB BLD CALC-MCNC: 16.7 G/DL — SIGNIFICANT CHANGE UP (ref 14–18)
HGB BLD-MCNC: 15.9 G/DL — SIGNIFICANT CHANGE UP (ref 14–18)
IMM GRANULOCYTES NFR BLD AUTO: 0.2 % — SIGNIFICANT CHANGE UP (ref 0.1–0.3)
INR BLD: 1.28 RATIO — SIGNIFICANT CHANGE UP (ref 0.65–1.3)
LACTATE BLDV-MCNC: 2.1 MMOL/L — HIGH (ref 0.5–1.6)
LYMPHOCYTES # BLD AUTO: 0.55 K/UL — LOW (ref 1.2–3.4)
LYMPHOCYTES # BLD AUTO: 11.7 % — LOW (ref 20.5–51.1)
MCHC RBC-ENTMCNC: 28.8 PG — SIGNIFICANT CHANGE UP (ref 27–31)
MCHC RBC-ENTMCNC: 33.2 G/DL — SIGNIFICANT CHANGE UP (ref 32–37)
MCV RBC AUTO: 86.8 FL — SIGNIFICANT CHANGE UP (ref 80–94)
MONOCYTES # BLD AUTO: 0.04 K/UL — LOW (ref 0.1–0.6)
MONOCYTES NFR BLD AUTO: 0.9 % — LOW (ref 1.7–9.3)
NEUTROPHILS # BLD AUTO: 4.07 K/UL — SIGNIFICANT CHANGE UP (ref 1.4–6.5)
NEUTROPHILS NFR BLD AUTO: 86.8 % — HIGH (ref 42.2–75.2)
NRBC # BLD: 0 /100 WBCS — SIGNIFICANT CHANGE UP (ref 0–0)
OTHER CELLS CSF MANUAL: 14 ML/DL — LOW (ref 18–22)
PCO2 BLDV: 33 MMHG — LOW (ref 41–51)
PH BLDV: 7.48 — HIGH (ref 7.26–7.43)
PLATELET # BLD AUTO: 165 K/UL — SIGNIFICANT CHANGE UP (ref 130–400)
PO2 BLDV: 32 MMHG — SIGNIFICANT CHANGE UP (ref 20–40)
POTASSIUM BLDV-SCNC: 4 MMOL/L — SIGNIFICANT CHANGE UP (ref 3.3–5.6)
POTASSIUM SERPL-MCNC: 4.2 MMOL/L — SIGNIFICANT CHANGE UP (ref 3.5–5)
POTASSIUM SERPL-SCNC: 4.2 MMOL/L — SIGNIFICANT CHANGE UP (ref 3.5–5)
PROT SERPL-MCNC: 7.2 G/DL — SIGNIFICANT CHANGE UP (ref 6–8)
PROTHROM AB SERPL-ACNC: 14.7 SEC — HIGH (ref 9.95–12.87)
RBC # BLD: 5.52 M/UL — SIGNIFICANT CHANGE UP (ref 4.7–6.1)
RBC # FLD: 13.2 % — SIGNIFICANT CHANGE UP (ref 11.5–14.5)
SAO2 % BLDV: 64 % — SIGNIFICANT CHANGE UP
SARS-COV-2 RNA SPEC QL NAA+PROBE: SIGNIFICANT CHANGE UP
SODIUM SERPL-SCNC: 136 MMOL/L — SIGNIFICANT CHANGE UP (ref 135–146)
WBC # BLD: 4.69 K/UL — LOW (ref 4.8–10.8)
WBC # FLD AUTO: 4.69 K/UL — LOW (ref 4.8–10.8)

## 2021-07-28 PROCEDURE — 36556 INSERT NON-TUNNEL CV CATH: CPT

## 2021-07-28 PROCEDURE — 71045 X-RAY EXAM CHEST 1 VIEW: CPT | Mod: 26

## 2021-07-28 PROCEDURE — 99291 CRITICAL CARE FIRST HOUR: CPT | Mod: 25

## 2021-07-28 PROCEDURE — 93010 ELECTROCARDIOGRAM REPORT: CPT

## 2021-07-28 RX ORDER — NOREPINEPHRINE BITARTRATE/D5W 8 MG/250ML
0.05 PLASTIC BAG, INJECTION (ML) INTRAVENOUS
Qty: 8 | Refills: 0 | Status: DISCONTINUED | OUTPATIENT
Start: 2021-07-28 | End: 2021-08-01

## 2021-07-28 RX ORDER — SODIUM CHLORIDE 9 MG/ML
3100 INJECTION, SOLUTION INTRAVENOUS ONCE
Refills: 0 | Status: COMPLETED | OUTPATIENT
Start: 2021-07-28 | End: 2021-07-28

## 2021-07-28 RX ORDER — ACETAMINOPHEN 500 MG
650 TABLET ORAL ONCE
Refills: 0 | Status: COMPLETED | OUTPATIENT
Start: 2021-07-28 | End: 2021-07-28

## 2021-07-28 RX ORDER — CEFEPIME 1 G/1
2000 INJECTION, POWDER, FOR SOLUTION INTRAMUSCULAR; INTRAVENOUS ONCE
Refills: 0 | Status: COMPLETED | OUTPATIENT
Start: 2021-07-28 | End: 2021-07-28

## 2021-07-28 RX ADMIN — CEFEPIME 100 MILLIGRAM(S): 1 INJECTION, POWDER, FOR SOLUTION INTRAMUSCULAR; INTRAVENOUS at 21:56

## 2021-07-28 RX ADMIN — SODIUM CHLORIDE 3100 MILLILITER(S): 9 INJECTION, SOLUTION INTRAVENOUS at 20:45

## 2021-07-28 RX ADMIN — Medication 9.36 MICROGRAM(S)/KG/MIN: at 23:45

## 2021-07-28 RX ADMIN — Medication 650 MILLIGRAM(S): at 20:30

## 2021-07-28 NOTE — ED PROVIDER NOTE - OBJECTIVE STATEMENT
60 y/o M w/ PMH of HTN, CAD s/p PCI (9/25/19, Dr. Mcclure) on aspirin and brilinta, cholecystitis s/p cholecystectomy by Dr Avalos in 2020 presents with acute onset epigastric abd pain, fever Tmax 106.7 rectally on presentation, and chills.  was seen in [facility] for evaluation, he was told he has gas around his pancreas with mildly elevated LFT's and was discharged home in the afternoon.  after d/c patient developed fever, chills and wife at bedside reports patient was confused.  patient AAOx3 but somnolent

## 2021-07-28 NOTE — ED PROVIDER NOTE - NS ED ROS FT
Constitutional:  (+) fevers and chills.  Eyes:  No visual changes, eye pain, or discharge.  ENT:  No hearing changes. No sore throat.  Neck:  No neck pain or stiffness.  Cardiac:  No CP or edema.  Resp:  No cough or SOB. No hemoptysis.   GI:  (+) abd pain. No nausea, vomiting, diarrhea.  :  No dysuria, frequency, or hematuria.  MSK:  No myalgias or joint pain/swelling.  Neuro: (+) somnolent No headache, dizziness, or weakness.  Skin:  No skin rash.

## 2021-07-28 NOTE — ED PROVIDER NOTE - PROGRESS NOTE DETAILS
kondrat-  CT findings with evidence of Cholangitis.  patient is s/p cholecystectomy no need for surgical consultation.  GI consulted.  patient approved for ICU.

## 2021-07-28 NOTE — ED PROVIDER NOTE - CLINICAL SUMMARY MEDICAL DECISION MAKING FREE TEXT BOX
Case signed out to me by Dr. Ledezma -- patient presented with abdominal pain, fever, eventually progressed to being hypotensive, started on pressors, found to have tranaminitis with elevated bili.     CT scan demonstrates inflammation around CBD suggestive of cholangitis. Patient is post cholecystectomy.    Likely cholangitis, will admit to ICU. Received fluids, antipyretics, broad spectrum abx and is on pressors.

## 2021-07-28 NOTE — ED PROVIDER NOTE - CARE PLAN
Principal Discharge DX:	Cholangitis  Secondary Diagnosis:	Sepsis  Secondary Diagnosis:	Abdominal pain

## 2021-07-28 NOTE — ED PROVIDER NOTE - PHYSICAL EXAMINATION
PHYSICAL EXAM: I have reviewed current vital signs.  GENERAL: NAD, well-nourished; well-developed.  HEAD:  Normocephalic, atraumatic.  EYES: EOMI, PERRL, conjunctiva and sclera clear.  ENT: MMM, no erythema/exudates.  NECK: Supple, no JVD.  CHEST/LUNG: Clear to auscultation bilaterally; no wheezes, rales, or rhonchi.  HEART: Regular rate and rhythm, normal S1 and S2; no murmurs, rubs, or gallops.  ABDOMEN:  epigastric abd tenderness with guarding.  Soft, nondistended.  EXTREMITIES:  2+ peripheral pulses; no clubbing, cyanosis, or edema.  PSYCH: Cooperative, appropriate, normal mood and affect.  NEUROLOGY: A&O x 3. Motor 5/5. Sensory intact. No focal neurological deficits. CN II - XII intact. (-) dysmetria, facial droop, pronator drift.  SKIN: Warm and diaphoretic.

## 2021-07-28 NOTE — ED PROVIDER NOTE - ATTENDING CONTRIBUTION TO CARE
see mdm for attending note 61M p/w fever @ 106, ams. Pt was anox3 earlier today seen in an ED in [facility] for complaints for abd pain. ct findings consistent with colitis and mildly elevated bnp.     vs- febrile 106- tachy    pe- altered mentation, tachy, ctab, no rash, pt wincing to pain with palpation to abd- non distended, non peritoneal.     concern for sepsis- started on ivf 30cc/kg and abx     on reeval- pt noted to be hypotensive s/p fluids. stared on pressors with RIJ TLC placed - verified on xr - /62    labs revealed elevated lft's    case s/o to dr. stanford pending ct abd pelvis/us and final admission to icu

## 2021-07-29 ENCOUNTER — TRANSCRIPTION ENCOUNTER (OUTPATIENT)
Age: 61
End: 2021-07-29

## 2021-07-29 LAB
-  K. PNEUMONIAE GROUP: SIGNIFICANT CHANGE UP
AMPHET UR-MCNC: NEGATIVE — SIGNIFICANT CHANGE UP
APAP SERPL-MCNC: <5 UG/ML — LOW (ref 10–30)
APPEARANCE UR: ABNORMAL
BACTERIA # UR AUTO: NEGATIVE — SIGNIFICANT CHANGE UP
BARBITURATES UR SCN-MCNC: NEGATIVE — SIGNIFICANT CHANGE UP
BENZODIAZ UR-MCNC: NEGATIVE — SIGNIFICANT CHANGE UP
BILIRUB UR-MCNC: NEGATIVE — SIGNIFICANT CHANGE UP
COCAINE METAB.OTHER UR-MCNC: NEGATIVE — SIGNIFICANT CHANGE UP
COLOR SPEC: YELLOW — SIGNIFICANT CHANGE UP
DIFF PNL FLD: NEGATIVE — SIGNIFICANT CHANGE UP
EPI CELLS # UR: 0 /HPF — SIGNIFICANT CHANGE UP (ref 0–5)
GLUCOSE UR QL: NEGATIVE — SIGNIFICANT CHANGE UP
GRAM STN FLD: SIGNIFICANT CHANGE UP
HYALINE CASTS # UR AUTO: 0 /LPF — SIGNIFICANT CHANGE UP (ref 0–7)
KETONES UR-MCNC: SIGNIFICANT CHANGE UP
LEUKOCYTE ESTERASE UR-ACNC: NEGATIVE — SIGNIFICANT CHANGE UP
LIDOCAIN IGE QN: 24 U/L — SIGNIFICANT CHANGE UP (ref 7–60)
METHADONE UR-MCNC: NEGATIVE — SIGNIFICANT CHANGE UP
METHOD TYPE: SIGNIFICANT CHANGE UP
NITRITE UR-MCNC: NEGATIVE — SIGNIFICANT CHANGE UP
OPIATES UR-MCNC: NEGATIVE — SIGNIFICANT CHANGE UP
PCP SPEC-MCNC: SIGNIFICANT CHANGE UP
PH UR: 6 — SIGNIFICANT CHANGE UP (ref 5–8)
PROPOXYPHENE QUALITATIVE URINE RESULT: NEGATIVE — SIGNIFICANT CHANGE UP
PROT UR-MCNC: NEGATIVE — SIGNIFICANT CHANGE UP
RBC CASTS # UR COMP ASSIST: 2 /HPF — SIGNIFICANT CHANGE UP (ref 0–4)
SALICYLATES SERPL-MCNC: <0.3 MG/DL — LOW (ref 4–30)
SP GR SPEC: 1.02 — SIGNIFICANT CHANGE UP (ref 1.01–1.03)
SPECIMEN SOURCE: SIGNIFICANT CHANGE UP
SPECIMEN SOURCE: SIGNIFICANT CHANGE UP
UROBILINOGEN FLD QL: SIGNIFICANT CHANGE UP
WBC UR QL: 0 /HPF — SIGNIFICANT CHANGE UP (ref 0–5)

## 2021-07-29 PROCEDURE — 71260 CT THORAX DX C+: CPT | Mod: 26,MA

## 2021-07-29 PROCEDURE — 74177 CT ABD & PELVIS W/CONTRAST: CPT | Mod: 26,MA

## 2021-07-29 PROCEDURE — 71045 X-RAY EXAM CHEST 1 VIEW: CPT | Mod: 26

## 2021-07-29 PROCEDURE — 99291 CRITICAL CARE FIRST HOUR: CPT

## 2021-07-29 PROCEDURE — 76705 ECHO EXAM OF ABDOMEN: CPT | Mod: 26

## 2021-07-29 RX ORDER — AMPICILLIN SODIUM AND SULBACTAM SODIUM 250; 125 MG/ML; MG/ML
3 INJECTION, POWDER, FOR SUSPENSION INTRAMUSCULAR; INTRAVENOUS EVERY 6 HOURS
Refills: 0 | Status: DISCONTINUED | OUTPATIENT
Start: 2021-07-29 | End: 2021-07-29

## 2021-07-29 RX ORDER — AMPICILLIN SODIUM AND SULBACTAM SODIUM 250; 125 MG/ML; MG/ML
3 INJECTION, POWDER, FOR SUSPENSION INTRAMUSCULAR; INTRAVENOUS ONCE
Refills: 0 | Status: DISCONTINUED | OUTPATIENT
Start: 2021-07-29 | End: 2021-07-29

## 2021-07-29 RX ORDER — SODIUM CHLORIDE 9 MG/ML
1000 INJECTION, SOLUTION INTRAVENOUS
Refills: 0 | Status: DISCONTINUED | OUTPATIENT
Start: 2021-07-29 | End: 2021-07-31

## 2021-07-29 RX ORDER — METRONIDAZOLE 500 MG
500 TABLET ORAL ONCE
Refills: 0 | Status: COMPLETED | OUTPATIENT
Start: 2021-07-29 | End: 2021-07-29

## 2021-07-29 RX ORDER — ATORVASTATIN CALCIUM 80 MG/1
80 TABLET, FILM COATED ORAL AT BEDTIME
Refills: 0 | Status: DISCONTINUED | OUTPATIENT
Start: 2021-07-29 | End: 2021-08-02

## 2021-07-29 RX ORDER — VANCOMYCIN HCL 1 G
1000 VIAL (EA) INTRAVENOUS ONCE
Refills: 0 | Status: COMPLETED | OUTPATIENT
Start: 2021-07-29 | End: 2021-07-29

## 2021-07-29 RX ORDER — METRONIDAZOLE 500 MG
500 TABLET ORAL EVERY 8 HOURS
Refills: 0 | Status: DISCONTINUED | OUTPATIENT
Start: 2021-07-29 | End: 2021-07-30

## 2021-07-29 RX ORDER — SENNA PLUS 8.6 MG/1
2 TABLET ORAL AT BEDTIME
Refills: 0 | Status: DISCONTINUED | OUTPATIENT
Start: 2021-07-29 | End: 2021-08-03

## 2021-07-29 RX ORDER — AMPICILLIN SODIUM AND SULBACTAM SODIUM 250; 125 MG/ML; MG/ML
INJECTION, POWDER, FOR SUSPENSION INTRAMUSCULAR; INTRAVENOUS
Refills: 0 | Status: DISCONTINUED | OUTPATIENT
Start: 2021-07-29 | End: 2021-07-29

## 2021-07-29 RX ORDER — ACETAMINOPHEN 500 MG
1000 TABLET ORAL ONCE
Refills: 0 | Status: COMPLETED | OUTPATIENT
Start: 2021-07-29 | End: 2021-07-29

## 2021-07-29 RX ORDER — ACETAMINOPHEN 500 MG
650 TABLET ORAL EVERY 6 HOURS
Refills: 0 | Status: DISCONTINUED | OUTPATIENT
Start: 2021-07-29 | End: 2021-07-31

## 2021-07-29 RX ORDER — CEFEPIME 1 G/1
2000 INJECTION, POWDER, FOR SOLUTION INTRAMUSCULAR; INTRAVENOUS EVERY 8 HOURS
Refills: 0 | Status: DISCONTINUED | OUTPATIENT
Start: 2021-07-29 | End: 2021-07-30

## 2021-07-29 RX ORDER — SODIUM CHLORIDE 9 MG/ML
1000 INJECTION, SOLUTION INTRAVENOUS ONCE
Refills: 0 | Status: COMPLETED | OUTPATIENT
Start: 2021-07-29 | End: 2021-07-29

## 2021-07-29 RX ORDER — ONDANSETRON 8 MG/1
4 TABLET, FILM COATED ORAL EVERY 6 HOURS
Refills: 0 | Status: DISCONTINUED | OUTPATIENT
Start: 2021-07-29 | End: 2021-08-03

## 2021-07-29 RX ORDER — METRONIDAZOLE 500 MG
TABLET ORAL
Refills: 0 | Status: DISCONTINUED | OUTPATIENT
Start: 2021-07-29 | End: 2021-07-30

## 2021-07-29 RX ORDER — MORPHINE SULFATE 50 MG/1
2 CAPSULE, EXTENDED RELEASE ORAL EVERY 4 HOURS
Refills: 0 | Status: DISCONTINUED | OUTPATIENT
Start: 2021-07-29 | End: 2021-08-03

## 2021-07-29 RX ORDER — CHLORHEXIDINE GLUCONATE 213 G/1000ML
1 SOLUTION TOPICAL DAILY
Refills: 0 | Status: DISCONTINUED | OUTPATIENT
Start: 2021-07-29 | End: 2021-08-03

## 2021-07-29 RX ADMIN — SENNA PLUS 2 TABLET(S): 8.6 TABLET ORAL at 21:48

## 2021-07-29 RX ADMIN — Medication 250 MILLIGRAM(S): at 02:01

## 2021-07-29 RX ADMIN — ATORVASTATIN CALCIUM 80 MILLIGRAM(S): 80 TABLET, FILM COATED ORAL at 21:48

## 2021-07-29 RX ADMIN — Medication 1000 MILLIGRAM(S): at 16:31

## 2021-07-29 RX ADMIN — SODIUM CHLORIDE 1000 MILLILITER(S): 9 INJECTION, SOLUTION INTRAVENOUS at 08:28

## 2021-07-29 RX ADMIN — CEFEPIME 100 MILLIGRAM(S): 1 INJECTION, POWDER, FOR SOLUTION INTRAMUSCULAR; INTRAVENOUS at 16:31

## 2021-07-29 RX ADMIN — Medication 100 MILLIGRAM(S): at 21:05

## 2021-07-29 RX ADMIN — CEFEPIME 100 MILLIGRAM(S): 1 INJECTION, POWDER, FOR SOLUTION INTRAMUSCULAR; INTRAVENOUS at 21:05

## 2021-07-29 RX ADMIN — Medication 100 MILLIGRAM(S): at 07:38

## 2021-07-29 RX ADMIN — SODIUM CHLORIDE 150 MILLILITER(S): 9 INJECTION, SOLUTION INTRAVENOUS at 07:39

## 2021-07-29 RX ADMIN — Medication 400 MILLIGRAM(S): at 15:14

## 2021-07-29 RX ADMIN — Medication 100 MILLIGRAM(S): at 16:31

## 2021-07-29 RX ADMIN — SODIUM CHLORIDE 150 MILLILITER(S): 9 INJECTION, SOLUTION INTRAVENOUS at 16:35

## 2021-07-29 RX ADMIN — CEFEPIME 100 MILLIGRAM(S): 1 INJECTION, POWDER, FOR SOLUTION INTRAMUSCULAR; INTRAVENOUS at 07:23

## 2021-07-29 NOTE — CHART NOTE - NSCHARTNOTEFT_GEN_A_CORE
PACU ANESTHESIA ADMISSION NOTE      Procedure:   Post op diagnosis:      ____  Intubated  TV:______       Rate: ______      FiO2: ______    _x___  Patent Airway    _x___  Full return of protective reflexes    _x___  Full recovery from anesthesia / back to baseline status    Vitals:    BP 95/54  P 105  R 14  T 38.5  Sat 97  Mental Status:  _x___ Awake   _____ Alert   _____ Drowsy   _____ Sedated    Nausea/Vomiting:  _x___  NO       ______Yes,   See Post - Op Orders         Pain Scale (0-10):  __0___    Treatment: _x___ None    ____ See Post - Op/PCA Orders    Post - Operative Fluids:   __x____ See Post - Op Orders    Plan: Discharge:   x___Critical Care    _____  Other:_________________    Comments:  No anesthesia issues or complications noted.  Discharge when criteria met.

## 2021-07-29 NOTE — PRE-ANESTHESIA EVALUATION ADULT - TEMPERATURE IN CELSIUS (DEGREES C)
Called patient he is not able to do a video visit his PC is not equipped with a camera or microphone, informed Dr. Dewitt she will do a telephone call.    36.8

## 2021-07-29 NOTE — H&P ADULT - HISTORY OF PRESENT ILLNESS
patient is a 62 y/o M w/ PMH of HTN, CAD s/p PCI (9/25/19, Dr. Mcclure) on aspirin and brilinta, cholecystitis s/p cholecystectomy by Dr Avalos in 2020 presents with fever, chills and severe epigastric abdominal pain x 1 day. per patient developed severe epigastric abdominal pain yesterday morning (7/28/2021), he went to our [facility] for evaluation, he was told he has gas around his pancrease and was discharged home in the afternoon. yesterday afternoon pt developed shaking chills with fever and persistent epigastric pain so he came in to the hospital for further evaluation.     in ED. patient was found to be febrile with Tmax of 106, hypotensive with BP of 80/50s, and tachycardiac to the 150s.    patient is a 62 y/o M w/ PMH of HTN, CAD s/p PCI (9/25/19, Dr. Mcclure) on aspirin and brilinta, cholecystitis s/p cholecystectomy by Dr Avalos in 2020 presents with fever, chills and severe epigastric abdominal pain x 1 day. per patient developed severe epigastric abdominal pain yesterday morning (7/28/2021), he went to our [facility] for evaluation, he was told he has gas around his pancrease and was discharged home in the afternoon. Then later in the evening around 9pm pt developed shaking chills with low fever with persistent epigastric pain, associated with nausea and non bilious vomiting x 2 so he came in to the hospital for further evaluation.     in ED. patient was found to be febrile with Tmax of 106, hypotensive with BP of 80/50s, and tachycardiac to the 150s. patient was started on levophed for BP support. received 3L of fluid bolus in the ED. his CT abdomen and pelvis is significant for Mild inflammatory changes about the nondilated CBD which may reflect cholangitis. T bili 4.7.    Patient was seen and examined bedside. at the time of examination patient was fatigued however in no acute distress. patient states his epigastric pain is mostly controlled.    I discussed patient's case with GI physician on call Dr Rooney   patient is a 62 y/o M w/ PMH of HTN, CAD s/p PCI (9/25/19, Dr. Mcclure) on aspirin and brilinta, cholecystitis s/p cholecystectomy by Dr Avalos in 2020 presents with fever, chills and severe epigastric abdominal pain x 1 day. per patient developed severe epigastric abdominal pain yesterday morning (7/28/2021), he went to our [facility] for evaluation, he was told he has gas around his pancrease and was discharged home in the afternoon. Then later in the evening around 9pm pt developed shaking chills with low fever with persistent epigastric pain, associated with nausea and non bilious vomiting x 2 so he came in to the hospital for further evaluation.     in ED. patient was found to be febrile with Tmax of 106, hypotensive with BP of 80/50s, and tachycardiac to the 150s. patient was started on levophed for BP support. received 3L of fluid bolus in the ED. his CT abdomen and pelvis is significant for Mild inflammatory changes about the nondilated CBD which may reflect cholangitis. T bili 4.7.    Patient was seen and examined bedside. at the time of examination patient was fatigued however in no acute distress. patient states his epigastric pain is mostly controlled.    I discussed patient's case with GI on call

## 2021-07-29 NOTE — ED ADULT NURSE NOTE - CHPI ED NUR SYMPTOMS NEG
no abdominal pain/no cough/no decreased eating/drinking/no diarrhea/no headache/no rash/no shortness of breath

## 2021-07-29 NOTE — CONSULT NOTE ADULT - SUBJECTIVE AND OBJECTIVE BOX
Patient  is a 60 y/o with PMHx of HTN, CAD s/p PCI (9/25/19, Dr. Mcclure) on aspirin and Brilinta cholecystitis s/p cholecystectomy by Dr Avalos in 2020 who presented to [facility] with abdominal pain. Patient notes that pain started 24 hours prior, sharp, severe 10/10, sudden in onset, without know precipitating factor. Patient along with pain noted fever and in the ED had a T max of 106 and was also found hypotensive and tachycardic. Since admission he does feel better and no longer has pain. He was seen at a prior hospital but discharge.     PAST MEDICAL & SURGICAL HISTORY:  CAD requring PCI with stents   HTN (hypertension)  CCY      MEDICATIONS  (STANDING):  atorvastatin 80 milliGRAM(s) Oral at bedtime  cefepime   IVPB 2000 milliGRAM(s) IV Intermittent every 8 hours  lactated ringers Bolus 1000 milliLiter(s) IV Bolus once  lactated ringers. 1000 milliLiter(s) (150 mL/Hr) IV Continuous <Continuous>  metroNIDAZOLE  IVPB      metroNIDAZOLE  IVPB 500 milliGRAM(s) IV Intermittent once  metroNIDAZOLE  IVPB 500 milliGRAM(s) IV Intermittent every 8 hours  norepinephrine Infusion 0.05 MICROgram(s)/kG/Min (9.36 mL/Hr) IV Continuous <Continuous>  senna 2 Tablet(s) Oral at bedtime    MEDICATIONS  (PRN):  morphine  - Injectable 2 milliGRAM(s) IV Push every 4 hours PRN Moderate Pain (4 - 6)  ondansetron Injectable 4 milliGRAM(s) IV Push every 6 hours PRN Nausea and/or Vomiting      Allergies  No Known Allergies      Review of Systems  General:  See HPI  HEENT: Denies Trouble Swallowing ,Denies  Sore Throat , Denies Change in hearing/vision/speech ,Denies Dizziness    Cardio: Denies  Chest Pain , Palpitations    Respiratory: Denies worsening of SOB, Denies Cough  Abdomen: See detailed HPI  Neuro: Denies Headache Denies Dizziness, Denies Paresthesias  MSK: Denies pain in Bones/Joints/Muscles   Psych: Patient denies depression, denies suicidal or homicidal ideations  Integ: Patient Denies rash, or new skin lesions       Vital Signs   T(F): 100 (29 Jul 2021 06:00), Max: 106.5 (28 Jul 2021 20:25)  HR: 92 (29 Jul 2021 06:00) (92 - 152)  BP: 109/68 (29 Jul 2021 06:00) (82/48 - 136/63)  BP(mean): 84 (29 Jul 2021 06:00) (60 - 84)  RR: 18 (29 Jul 2021 06:00) (17 - 20)  SpO2: 100% (29 Jul 2021 06:00) (96% - 100%)  Physical Exam  Gen: NAD, diaphoretic   HEENT: NC/AT, Mucosal Membranes Dry  Cardio: S1/S2 No S3/S4, Tachy regular  Resp: CTA B/L  Abdomen: Soft, ND/NT  Neuro: AAOx3  Extremities: FROM x 4    LABS:                        15.9   4.69  )-----------( 165      ( 28 Jul 2021 20:49 )             47.9     07-28    136  |  101  |  18  ----------------------------<  124<H>  4.2   |  22  |  1.1    Ca    9.4      28 Jul 2021 20:49    TPro  7.2  /  Alb  4.5  /  TBili  4.7<H>  /  DBili  x   /  AST  493<H>  /  ALT  470<H>  /  AlkPhos  307<H>  07-28        Lipase:24         Blood Gas Venous - Lactate: 2.1 mmoL/L (07-28-21 @ 20:42)      Coags:     14.70  ----< 1.28    ( 28 Jul 2021 20:49 )     27.5        RADIOLOGY & ADDITIONAL STUDIES:  US Abdomen Upper Quadrant Right 07.29.21   IMPRESSION:    Postcholecystectomy without evidence of CBD dilatation.  CBD 5mm    CT of Abdomen   IMPRESSION:  Mild inflammatory changes about the nondilated CBD may reflect evidence of cholangitis.    Otherwise, no evidence of acute thoracic, abdominal or pelvic pathology.    Stable pulmonary nodules.

## 2021-07-29 NOTE — CONSULT NOTE ADULT - ASSESSMENT
Patient  is a 62 y/o with PMHx of HTN, CAD s/p PCI (9/25/19, Dr. Mcclure) on aspirin and Brilinta cholecystitis s/p cholecystectomy by Dr Avalos in 2020 who presented to [facility] with abdominal pain. Patient notes that pain started 24 hours prior, sharp, severe 10/10, sudden in onset, without know precipitating factor. Patient along with pain noted fever and in the ED had a T max of 106 and was also found hypotensive and tachycardic. While he does not have CBD dilation he had some inflammation noted around the CBD on CT scan as well as an elevated T quynh to 4.7. Patient is NPO and he is aware not to eat or drink anything. May plan ERCP latter today with stent placement ( Would not be able to perform sphincterotomy as on DAPT for prior PCI.    Abdominal Pain/ Cholangitis  - Met SIRS criteria in ED  - CT scan with CBD of 5 mm but inflammation noted around the CBD  - No evidence of Pancreatitis as Lipase 24 and no inflammation noted on any imaging modalities of the Pancreas   - Patient admitted to ICU   - On Cefepime and Flagyl  - Maintain NPO  - INR 1.28, Platelet count 165- WAS on DAPT ASA and Brilinta   - ERCP later today possible   - Will follow

## 2021-07-29 NOTE — H&P ADULT - ASSESSMENT
patient is a 62 y/o M w/ PMH of HTN, CAD s/p PCI (9/25/19, Dr. Mcclure) on aspirin and brilinta, cholecystitis s/p cholecystectomy by Dr Avalos in 2020 presents with fever, chills and severe epigastric abdominal pain x 1 day.     IMPRESSION:  Sceptic Shock  Probable cholangitis  HO CAD s/p PCI in 2019  HO Cholecystectomy  Pulmonary nodules    PLAN:    CNS: Avoid CNS depressants    HEENT: Oral care    PULMONARY:  HOB @ 45 degrees.  Keep O2 > 94%. aspiration precautions Pulmonary toilet.     CARDIOVASCULAR: Fluid resuscitation with LR at 150cc/hr. Avoid fluid overload. Hold Aspirin and Brilinta     GI: GI prophylaxis.  NPO.  Bowel regimen. [facility] follow up in the AM for ERCP.     RENAL:  Follow up lytes.  Correct as needed    INFECTIOUS DISEASE: Follow up cultures. Unasyn for antibiotics.     HEMATOLOGICAL:  DVT prophylaxis with SCDs for now.    ENDOCRINE:  Follow up FS.  Insulin protocol if needed.    MUSCULOSKELETAL: bed rest    Lines: Right IJ CVC  Dispo: MICU  full code         patient is a 60 y/o M w/ PMH of HTN, CAD s/p PCI (9/25/19, Dr. Mcclure) on aspirin and brilinta, cholecystitis s/p cholecystectomy by Dr Avalos in 2020 presents with fever, chills and severe epigastric abdominal pain x 1 day.     IMPRESSION:  Sceptic Shock  Probable cholangitis  HO CAD s/p PCI in 2019  HO Cholecystectomy  Pulmonary nodules    PLAN:    CNS: Avoid CNS depressants. Pain control    HEENT: Oral care    PULMONARY:  HOB @ 45 degrees.  Keep O2 > 94%. aspiration precautions Pulmonary toilet.     CARDIOVASCULAR: Fluid resuscitation with LR at 150cc/hr. Avoid fluid overload. Hold Aspirin and Brilinta. Hold metoprolol    GI: GI prophylaxis.  NPO.  Bowel regimen. [facility] follow up in the AM for ERCP.     RENAL:  Follow up lytes.  Correct as needed    INFECTIOUS DISEASE: Follow up cultures. Start Unasyn for antibiotics. Avoid hepatotoxic agents, hypothermic blanket.    HEMATOLOGICAL:  DVT prophylaxis with SCDs for now.    ENDOCRINE:  Follow up FS.  Insulin protocol if needed.    MUSCULOSKELETAL: bed rest    Lines: Right IJ CVC  Dispo: MICU  full code         patient is a 62 y/o M w/ PMH of HTN, CAD s/p PCI (9/25/19, Dr. Mcclure) on aspirin and brilinta, cholecystitis s/p cholecystectomy by Dr Avalos in 2020 presents with fever, chills and severe epigastric abdominal pain x 1 day.     IMPRESSION:  Sceptic Shock  Probable cholangitis  HO CAD s/p PCI in 2019  HO Cholecystectomy  Pulmonary nodules    PLAN:    CNS: Avoid CNS depressants. Pain control    HEENT: Oral care    PULMONARY:  HOB @ 45 degrees.  Keep O2 > 94%. aspiration precautions Pulmonary toilet.     CARDIOVASCULAR: Fluid resuscitation with LR at 150cc/hr. Avoid fluid overload. Hold Aspirin and Brilinta. Hold metoprolol    GI: GI prophylaxis.  NPO.  Bowel regimen. [facility] follow up in the AM for ERCP.     RENAL:  Follow up lytes.  Correct as needed    INFECTIOUS DISEASE: Follow up cultures. Start Unasyn 3g q6hrs for antibiotics. Avoid hepatotoxic agents, hypothermic blanket.    HEMATOLOGICAL:  DVT prophylaxis with SCDs for now.    ENDOCRINE:  Follow up FS.  Insulin protocol if needed.    MUSCULOSKELETAL: bed rest    Lines: Right IJ CVC  Dispo: MICU  full code         patient is a 62 y/o M w/ PMH of HTN, CAD s/p PCI (9/25/19, Dr. Mcclure) on aspirin and brilinta, cholecystitis s/p cholecystectomy by Dr Avalos in 2020 presents with fever, chills and severe epigastric abdominal pain x 1 day.     IMPRESSION:  Sepsis present on admission  Septic Shock  Probable cholangitis  HO CAD s/p PCI in 2019  HO Cholecystectomy  Pulmonary nodules    PLAN:    CNS: Avoid CNS depressants. Pain control    HEENT: Oral care    PULMONARY:  HOB @ 45 degrees.  Keep O2 > 94%. aspiration precautions Pulmonary toilet.     CARDIOVASCULAR: Fluid resuscitation with LR at 150cc/hr. cardio f/up    GI: GI prophylaxis.  NPO.  Bowel regimen. Advance GI follow up     RENAL:  Follow up lytes.  Correct as needed    INFECTIOUS DISEASE: Follow up cultures. cefepime/ flagyl . Avoid hepatotoxic agents    HEMATOLOGICAL:  DVT prophylaxis with SCDs for now.    ENDOCRINE:  Follow up FS.  Insulin protocol if needed.    MUSCULOSKELETAL: bed rest    Lines: Right IJ CVC  Dispo: MICU  full code

## 2021-07-29 NOTE — H&P ADULT - ATTENDING COMMENTS
events noted, sepsis present on admission/ cholangitis/ increase LFT, taper pressors, give more IVF for cheetah, f/up blood cx, GI eval, MICU

## 2021-07-29 NOTE — H&P ADULT - NSHPLABSRESULTS_GEN_ALL_CORE
Labs:  CAPILLARY BLOOD GLUCOSE                              15.9   4.69  )-----------( 165      ( 2021 20:49 )             47.9       Auto Neutrophil %: 86.8 % (21 @ 20:49)  Auto Immature Granulocyte %: 0.2 % (21 @ 20:49)        136  |  101  |  18  ----------------------------<  124<H>  4.2   |  22  |  1.1      Calcium, Total Serum: 9.4 mg/dL (21 @ 20:49)      LFTs:             x    | x    | x        ------------------[x       ( 2021 00:50 )  x    | x    | x           Lipase:24     Amylase:x         Blood Gas Venous - Lactate: 2.1 mmoL/L (21 @ 20:42)      Coags:     14.70  ----< 1.28    ( 2021 20:49 )     27.5                Urinalysis Basic - ( 2021 00:00 )    Color: Yellow / Appearance: Slightly Turbid / S.016 / pH: x  Gluc: x / Ketone: Trace  / Bili: Negative / Urobili: <2 mg/dL   Blood: x / Protein: Negative / Nitrite: Negative   Leuk Esterase: Negative / RBC: 2 /HPF / WBC 0 /HPF   Sq Epi: x / Non Sq Epi: 0 /HPF / Bacteria: Negative        < from: US Abdomen Upper Quadrant Right (21 @ 02:03) >    IMPRESSION:    Postcholecystectomy without evidence of CBD dilatation.    < end of copied text >    < from: CT Abdomen and Pelvis w/ IV Cont (21 @ 01:28) >    IMPRESSION:      Mild inflammatory changes about the nondilated CBD may reflect evidence of cholangitis.    Otherwise, no evidence of acute thoracic, abdominal or pelvic pathology.    Stable pulmonary nodules.      --- End of Report ---    < end of copied text >

## 2021-07-29 NOTE — ED ADULT NURSE NOTE - NSIMPLEMENTINTERV_GEN_ALL_ED
Implemented All Fall with Harm Risk Interventions:  [place] to call system. Call bell, personal items and telephone within reach. Instruct patient to call for assistance. Room bathroom lighting operational. Non-slip footwear when patient is off stretcher. Physically safe environment: no spills, clutter or unnecessary equipment. Stretcher in lowest position, wheels locked, appropriate side rails in place. Provide visual cue, wrist band, yellow gown, etc. Monitor gait and stability. Monitor for mental status changes and reorient to person, place, and time. Review medications for side effects contributing to fall risk. Reinforce activity limits and safety measures with patient and family. Provide visual clues: red socks.

## 2021-07-29 NOTE — CONSULT NOTE ADULT - ATTENDING COMMENTS
Patient seen and examined during GI rounds.  Patient seen in endoscopy suite wife is at bedside, has hemodynamic instability secondary to cholangitis, status post remote history of cholecystectomy.  Patient needs an urgent ERCP please follow ERCP report and recommendations

## 2021-07-29 NOTE — ED ADULT NURSE NOTE - OBJECTIVE STATEMENT
60 y/o male pt came c/o fever and vomiting since the morning, pt stated he was having some epigastric and chest pain in the morning, but no fever, went to [facility] and had a chest pain work up in ER with CT scan, which did not show anything abnormal, came back home and became febrile. Pt is lethargic and confused, fever in .5 rectal, rapid cooling was initiated with ice and cooling blanket, septic work up and treatment was ordered, will reassess.

## 2021-07-29 NOTE — CONSULT NOTE ADULT - SUBJECTIVE AND OBJECTIVE BOX
HPI:  patient is a 62 y/o M w/ PMH of HTN, CAD s/p PCI (9/25/19, Dr. Mcclure) on aspirin and brilinta, cholecystitis s/p cholecystectomy by Dr Avalos in 2020 presents with fever, chills and severe epigastric abdominal pain x 1 day. per patient developed severe epigastric abdominal pain yesterday morning (7/28/2021), he went to our [facility] for evaluation, he was told he has gas around his pancrease and was discharged home in the afternoon. Then later in the evening around 9pm pt developed shaking chills with low fever with persistent epigastric pain, associated with nausea and non bilious vomiting x 2 so he came in to the hospital for further evaluation.     in ED. patient was found to be febrile with Tmax of 106, hypotensive with BP of 80/50s, and tachycardiac to the 150s. patient was started on levophed for BP support. received 3L of fluid bolus in the ED. his CT abdomen and pelvis is significant for Mild inflammatory changes about the nondilated CBD which may reflect cholangitis. T bili 4.7.    Patient was seen and examined bedside. at the time of examination patient was fatigued however in no acute distress. patient states his epigastric pain is mostly controlled.    I discussed patient's case with GI on call   (29 Jul 2021 03:33)      PAST MEDICAL & SURGICAL HISTORY  CAD in native artery  stents 9/2019    HTN (hypertension)    No significant past surgical history        FAMILY HISTORY:  FAMILY HISTORY:  FH: aortic dissection  mom        SOCIAL HISTORY:  []smoker  []Alcohol  []Drug    ALLERGIES:  No Known Allergies      MEDICATIONS:  MEDICATIONS  (STANDING):  atorvastatin 80 milliGRAM(s) Oral at bedtime  cefepime   IVPB 2000 milliGRAM(s) IV Intermittent every 8 hours  lactated ringers. 1000 milliLiter(s) (150 mL/Hr) IV Continuous <Continuous>  metroNIDAZOLE  IVPB      metroNIDAZOLE  IVPB 500 milliGRAM(s) IV Intermittent every 8 hours  norepinephrine Infusion 0.05 MICROgram(s)/kG/Min (9.36 mL/Hr) IV Continuous <Continuous>  senna 2 Tablet(s) Oral at bedtime    MEDICATIONS  (PRN):  morphine  - Injectable 2 milliGRAM(s) IV Push every 4 hours PRN Moderate Pain (4 - 6)  ondansetron Injectable 4 milliGRAM(s) IV Push every 6 hours PRN Nausea and/or Vomiting      HOME MEDICATIONS:  Home Medications:  acetaminophen 325 mg oral tablet: 2 tab(s) orally every 6 hours (03 Jun 2020 08:33)      VITALS:   T(F): 98.3 (07-29 @ 11:16), Max: 106.5 (07-28 @ 20:25)  HR: 92 (07-29 @ 11:20) (88 - 152)  BP: 124/68 (07-29 @ 11:20) (82/48 - 136/63)  BP(mean): 82 (07-29 @ 11:20) (60 - 84)  RR: 18 (07-29 @ 11:20) (15 - 21)  SpO2: 97% (07-29 @ 11:20) (96% - 100%)    I&O's Summary    28 Jul 2021 07:01  -  29 Jul 2021 07:00  --------------------------------------------------------  IN: 0 mL / OUT: 1700 mL / NET: -1700 mL    29 Jul 2021 07:01  -  29 Jul 2021 13:56  --------------------------------------------------------  IN: 1400 mL / OUT: 1300 mL / NET: 100 mL        REVIEW OF SYSTEMS:  Couldn't evaluate the patient as he was already undergoing ERCP; as per initial HPI:  CONSTITUTIONAL: fever  EYES: No visual changes  ENT: No vertigo or throat pain   NECK: No pain or stiffness  RESPIRATORY: No cough, wheezing, hemoptysis; No shortness of breath  CARDIOVASCULAR: No chest pain or palpitations  GASTROINTESTINAL: Abdominal pain, nausea, vomiting  GENITOURINARY: No dysuria, frequency or hematuria  NEUROLOGICAL: No numbness or weakness  SKIN: No itching, no rashes  MSK: No pain    PHYSICAL EXAM:  Couldn't examine the patient as he was already getting the procedure      LABS:                        15.9   4.69  )-----------( 165      ( 28 Jul 2021 20:49 )             47.9     07-28    136  |  101  |  18  ----------------------------<  124<H>  4.2   |  22  |  1.1    Ca    9.4      28 Jul 2021 20:49    TPro  7.2  /  Alb  4.5  /  TBili  4.7<H>  /  DBili  x   /  AST  493<H>  /  ALT  470<H>  /  AlkPhos  307<H>  07-28    PT/INR - ( 28 Jul 2021 20:49 )   PT: 14.70 sec;   INR: 1.28 ratio         PTT - ( 28 Jul 2021 20:49 )  PTT:27.5 sec          Troponin trend:            RADIOLOGY:  -CXR:  < from: Xray Chest 1 View AP/PA (07.29.21 @ 01:42) >  Impression:    Right IJ catheter tip overlies SVC.      Lower lung volumes. No consolidation, effusion pneumothorax.      < end of copied text >    -TTE:  < from: Transthoracic Echocardiogram (09.25.19 @ 10:36) >  Summary:   1. Left ventricular ejection fraction, by visual estimation, is 60 to   65%.   2. Spectral Doppler shows impaired relaxation pattern of left   ventricular myocardial filling (Grade I diastolic dysfunction).   3. Mild mitral valve regurgitation.    < end of copied text >    -CCTA:  -STRESS TEST:  -CATHETERIZATION:  < from: Cardiac Cath Lab - Adult (09.25.19 @ 07:26) >  CORONARY CIRCULATION: The coronary circulation is co-dominant. There was    significant 1-vessel coronary artery disease (circumflex). Left main: The    vessel was medium to large sized. Angiography showed no evidence of    disease. LAD: The vessel was medium to large sized. Angiography showed no    evidence of disease. Distal LAD: The vessel was small sized. Angiography    showed minor luminal irregularities with no flow limiting lesions.    Circumflex: The vessel was medium sized (co-dominant). Angiography showed    mild atherosclerosis with no flow limiting lesions. Mid circumflex: The    vessel was medium sized. Angiography showed moderate atherosclerosis with    no clinical lesions appreciated. There was a diffuse 95 % stenosis. There    was MILDRED grade 3 flow through the vessel (brisk flow). This lesion is a    likely culprit for the patient's recent myocardial infarction. An    intervention was performed. RCA: The vessel was medium sized    (co-dominant). Angiography showed no evidence of disease.    < end of copied text >      ECG: sinus tachycardia HPI:  patient is a 62 y/o M w/ PMH of HTN, CAD s/p PCI (9/25/19, Dr. Mcclure) on aspirin and brilinta, cholecystitis s/p cholecystectomy by Dr Avalos in 2020 presents with fever, chills and severe epigastric abdominal pain x 1 day. per patient developed severe epigastric abdominal pain yesterday morning (7/28/2021), he went to our [facility] for evaluation, he was told he has gas around his pancrease and was discharged home in the afternoon. Then later in the evening around 9pm pt developed shaking chills with low fever with persistent epigastric pain, associated with nausea and non bilious vomiting x 2 so he came in to the hospital for further evaluation. He denies chest pain or SOB.    in ED. patient was found to be febrile with Tmax of 106, hypotensive with BP of 80/50s, and tachycardiac to the 150s. patient was started on levophed for BP support. received 3L of fluid bolus in the ED. his CT abdomen and pelvis is significant for Mild inflammatory changes about the nondilated CBD which may reflect cholangitis. T bili 4.7.         PAST MEDICAL & SURGICAL HISTORY  CAD in native artery  stents 9/2019    HTN (hypertension)    No significant past surgical history        FAMILY HISTORY:  FAMILY HISTORY:  FH: aortic dissection  mom        SOCIAL HISTORY:  []smoker  []Alcohol  []Drug    ALLERGIES:  No Known Allergies      MEDICATIONS:  MEDICATIONS  (STANDING):  atorvastatin 80 milliGRAM(s) Oral at bedtime  cefepime   IVPB 2000 milliGRAM(s) IV Intermittent every 8 hours  lactated ringers. 1000 milliLiter(s) (150 mL/Hr) IV Continuous <Continuous>  metroNIDAZOLE  IVPB      metroNIDAZOLE  IVPB 500 milliGRAM(s) IV Intermittent every 8 hours  norepinephrine Infusion 0.05 MICROgram(s)/kG/Min (9.36 mL/Hr) IV Continuous <Continuous>  senna 2 Tablet(s) Oral at bedtime    MEDICATIONS  (PRN):  morphine  - Injectable 2 milliGRAM(s) IV Push every 4 hours PRN Moderate Pain (4 - 6)  ondansetron Injectable 4 milliGRAM(s) IV Push every 6 hours PRN Nausea and/or Vomiting      HOME MEDICATIONS:  Home Medications:  acetaminophen 325 mg oral tablet: 2 tab(s) orally every 6 hours (03 Jun 2020 08:33)      VITALS:   T(F): 98.3 (07-29 @ 11:16), Max: 106.5 (07-28 @ 20:25)  HR: 92 (07-29 @ 11:20) (88 - 152)  BP: 124/68 (07-29 @ 11:20) (82/48 - 136/63)  BP(mean): 82 (07-29 @ 11:20) (60 - 84)  RR: 18 (07-29 @ 11:20) (15 - 21)  SpO2: 97% (07-29 @ 11:20) (96% - 100%)    I&O's Summary    28 Jul 2021 07:01  -  29 Jul 2021 07:00  --------------------------------------------------------  IN: 0 mL / OUT: 1700 mL / NET: -1700 mL    29 Jul 2021 07:01  -  29 Jul 2021 13:56  --------------------------------------------------------  IN: 1400 mL / OUT: 1300 mL / NET: 100 mL        REVIEW OF SYSTEMS:    CONSTITUTIONAL: fever  EYES: No visual changes  ENT: No vertigo or throat pain   NECK: No pain or stiffness  RESPIRATORY: No cough, wheezing, hemoptysis; No shortness of breath  CARDIOVASCULAR: No chest pain or palpitations  GASTROINTESTINAL: no abdominal pain, but complains of nausea, and had 1 episode of vomiting  GENITOURINARY: No dysuria, frequency or hematuria  NEUROLOGICAL: No numbness or weakness  SKIN: No itching, no rashes  MSK: No pain    PHYSICAL EXAM:    GA: Jaundiced, ill appearing   Neck: no JVP  Heart: Nl S1 S2 no murmur   Lungs: clear to anterior auscultation  Abd: Soft nontender  Ext: Nl pulses no edema  Neuro: nonfocal  Skin: no lesions or rashes     LABS:                        15.9   4.69  )-----------( 165      ( 28 Jul 2021 20:49 )             47.9     07-28    136  |  101  |  18  ----------------------------<  124<H>  4.2   |  22  |  1.1    Ca    9.4      28 Jul 2021 20:49    TPro  7.2  /  Alb  4.5  /  TBili  4.7<H>  /  DBili  x   /  AST  493<H>  /  ALT  470<H>  /  AlkPhos  307<H>  07-28    PT/INR - ( 28 Jul 2021 20:49 )   PT: 14.70 sec;   INR: 1.28 ratio         PTT - ( 28 Jul 2021 20:49 )  PTT:27.5 sec          Troponin trend:            RADIOLOGY:  -CXR:  < from: Xray Chest 1 View AP/PA (07.29.21 @ 01:42) >  Impression:    Right IJ catheter tip overlies SVC.      Lower lung volumes. No consolidation, effusion pneumothorax.      < end of copied text >    -TTE:  < from: Transthoracic Echocardiogram (09.25.19 @ 10:36) >  Summary:   1. Left ventricular ejection fraction, by visual estimation, is 60 to   65%.   2. Spectral Doppler shows impaired relaxation pattern of left   ventricular myocardial filling (Grade I diastolic dysfunction).   3. Mild mitral valve regurgitation.    < end of copied text >    -CCTA:  -STRESS TEST:  -CATHETERIZATION:  < from: Cardiac Cath Lab - Adult (09.25.19 @ 07:26) >  CORONARY CIRCULATION: The coronary circulation is co-dominant. There was    significant 1-vessel coronary artery disease (circumflex). Left main: The    vessel was medium to large sized. Angiography showed no evidence of    disease. LAD: The vessel was medium to large sized. Angiography showed no    evidence of disease. Distal LAD: The vessel was small sized. Angiography    showed minor luminal irregularities with no flow limiting lesions.    Circumflex: The vessel was medium sized (co-dominant). Angiography showed    mild atherosclerosis with no flow limiting lesions. Mid circumflex: The    vessel was medium sized. Angiography showed moderate atherosclerosis with    no clinical lesions appreciated. There was a diffuse 95 % stenosis. There    was MILDRED grade 3 flow through the vessel (brisk flow). This lesion is a    likely culprit for the patient's recent myocardial infarction. An    intervention was performed. RCA: The vessel was medium sized    (co-dominant). Angiography showed no evidence of disease.    < end of copied text >      ECG: sinus tachycardia

## 2021-07-29 NOTE — CONSULT NOTE ADULT - ASSESSMENT
60 y/o M w/ PMH of HTN, CAD s/p PCI (9/25/19, Dr. Mcclure) on aspirin and brilinta, cholecystitis s/p cholecystectomy by Dr Avalos in 2020 presents with fever, chills and severe epigastric abdominal pain x 1 day. Admitted for septic shock secondary to cholangitis and undergoing ERCP    # CAD s/p PCI on DAPT  - OK to discontinue DAPT for procedure; please resume as soon as safe and possible 62 y/o M w/ PMH of HTN, CAD s/p PCI (9/25/19, Dr. Mcclure) on aspirin and brilinta, cholecystitis s/p cholecystectomy by Dr Avalos in 2020 presents with fever, chills and severe epigastric abdominal pain x 1 day. Admitted for septic shock secondary to cholangitis and undergoing ERCP    # CAD s/p PCI on DAPT  - OK to discontinue DAPT for procedure; please resume as soon as safe and possible  - Will follow

## 2021-07-30 LAB
ALBUMIN SERPL ELPH-MCNC: 3 G/DL — LOW (ref 3.5–5.2)
ALP SERPL-CCNC: 179 U/L — HIGH (ref 30–115)
ALT FLD-CCNC: 240 U/L — HIGH (ref 0–41)
AMYLASE P1 CFR SERPL: 31 U/L — SIGNIFICANT CHANGE UP (ref 25–115)
ANION GAP SERPL CALC-SCNC: 9 MMOL/L — SIGNIFICANT CHANGE UP (ref 7–14)
APTT BLD: 33.6 SEC — SIGNIFICANT CHANGE UP (ref 27–39.2)
AST SERPL-CCNC: 109 U/L — HIGH (ref 0–41)
BASOPHILS # BLD AUTO: 0.01 K/UL — SIGNIFICANT CHANGE UP (ref 0–0.2)
BASOPHILS NFR BLD AUTO: 0.1 % — SIGNIFICANT CHANGE UP (ref 0–1)
BILIRUB SERPL-MCNC: 5.5 MG/DL — HIGH (ref 0.2–1.2)
BLD GP AB SCN SERPL QL: SIGNIFICANT CHANGE UP
BUN SERPL-MCNC: 13 MG/DL — SIGNIFICANT CHANGE UP (ref 10–20)
CALCIUM SERPL-MCNC: 7.9 MG/DL — LOW (ref 8.5–10.1)
CHLORIDE SERPL-SCNC: 107 MMOL/L — SIGNIFICANT CHANGE UP (ref 98–110)
CO2 SERPL-SCNC: 23 MMOL/L — SIGNIFICANT CHANGE UP (ref 17–32)
COVID-19 SPIKE DOMAIN AB INTERP: POSITIVE
COVID-19 SPIKE DOMAIN ANTIBODY RESULT: >250 U/ML — HIGH
CREAT SERPL-MCNC: 1 MG/DL — SIGNIFICANT CHANGE UP (ref 0.7–1.5)
CULTURE RESULTS: NO GROWTH — SIGNIFICANT CHANGE UP
EOSINOPHIL # BLD AUTO: 0.04 K/UL — SIGNIFICANT CHANGE UP (ref 0–0.7)
EOSINOPHIL NFR BLD AUTO: 0.5 % — SIGNIFICANT CHANGE UP (ref 0–8)
GLUCOSE SERPL-MCNC: 116 MG/DL — HIGH (ref 70–99)
HCT VFR BLD CALC: 35.4 % — LOW (ref 42–52)
HCT VFR BLD CALC: 36.9 % — LOW (ref 42–52)
HCT VFR BLD CALC: 37.4 % — LOW (ref 42–52)
HGB BLD-MCNC: 11.7 G/DL — LOW (ref 14–18)
HGB BLD-MCNC: 12.1 G/DL — LOW (ref 14–18)
HGB BLD-MCNC: 12.2 G/DL — LOW (ref 14–18)
IMM GRANULOCYTES NFR BLD AUTO: 1 % — HIGH (ref 0.1–0.3)
INR BLD: 1.48 RATIO — HIGH (ref 0.65–1.3)
LIDOCAIN IGE QN: 10 U/L — SIGNIFICANT CHANGE UP (ref 7–60)
LYMPHOCYTES # BLD AUTO: 0.63 K/UL — LOW (ref 1.2–3.4)
LYMPHOCYTES # BLD AUTO: 7.3 % — LOW (ref 20.5–51.1)
MAGNESIUM SERPL-MCNC: 1.8 MG/DL — SIGNIFICANT CHANGE UP (ref 1.8–2.4)
MCHC RBC-ENTMCNC: 29.1 PG — SIGNIFICANT CHANGE UP (ref 27–31)
MCHC RBC-ENTMCNC: 29.3 PG — SIGNIFICANT CHANGE UP (ref 27–31)
MCHC RBC-ENTMCNC: 29.4 PG — SIGNIFICANT CHANGE UP (ref 27–31)
MCHC RBC-ENTMCNC: 32.6 G/DL — SIGNIFICANT CHANGE UP (ref 32–37)
MCHC RBC-ENTMCNC: 32.8 G/DL — SIGNIFICANT CHANGE UP (ref 32–37)
MCHC RBC-ENTMCNC: 33.1 G/DL — SIGNIFICANT CHANGE UP (ref 32–37)
MCV RBC AUTO: 88.5 FL — SIGNIFICANT CHANGE UP (ref 80–94)
MCV RBC AUTO: 89.3 FL — SIGNIFICANT CHANGE UP (ref 80–94)
MCV RBC AUTO: 89.6 FL — SIGNIFICANT CHANGE UP (ref 80–94)
MONOCYTES # BLD AUTO: 0.57 K/UL — SIGNIFICANT CHANGE UP (ref 0.1–0.6)
MONOCYTES NFR BLD AUTO: 6.6 % — SIGNIFICANT CHANGE UP (ref 1.7–9.3)
NEUTROPHILS # BLD AUTO: 7.24 K/UL — HIGH (ref 1.4–6.5)
NEUTROPHILS NFR BLD AUTO: 84.5 % — HIGH (ref 42.2–75.2)
NRBC # BLD: 0 /100 WBCS — SIGNIFICANT CHANGE UP (ref 0–0)
PLATELET # BLD AUTO: 101 K/UL — LOW (ref 130–400)
PLATELET # BLD AUTO: 108 K/UL — LOW (ref 130–400)
PLATELET # BLD AUTO: 108 K/UL — LOW (ref 130–400)
POTASSIUM SERPL-MCNC: 4 MMOL/L — SIGNIFICANT CHANGE UP (ref 3.5–5)
POTASSIUM SERPL-SCNC: 4 MMOL/L — SIGNIFICANT CHANGE UP (ref 3.5–5)
PROT SERPL-MCNC: 4.8 G/DL — LOW (ref 6–8)
PROTHROM AB SERPL-ACNC: 17 SEC — HIGH (ref 9.95–12.87)
RBC # BLD: 4 M/UL — LOW (ref 4.7–6.1)
RBC # BLD: 4.12 M/UL — LOW (ref 4.7–6.1)
RBC # BLD: 4.19 M/UL — LOW (ref 4.7–6.1)
RBC # FLD: 13.2 % — SIGNIFICANT CHANGE UP (ref 11.5–14.5)
RBC # FLD: 13.3 % — SIGNIFICANT CHANGE UP (ref 11.5–14.5)
RBC # FLD: 13.6 % — SIGNIFICANT CHANGE UP (ref 11.5–14.5)
SARS-COV-2 IGG+IGM SERPL QL IA: >250 U/ML — HIGH
SARS-COV-2 IGG+IGM SERPL QL IA: POSITIVE
SODIUM SERPL-SCNC: 139 MMOL/L — SIGNIFICANT CHANGE UP (ref 135–146)
SPECIMEN SOURCE: SIGNIFICANT CHANGE UP
WBC # BLD: 6.45 K/UL — SIGNIFICANT CHANGE UP (ref 4.8–10.8)
WBC # BLD: 7.02 K/UL — SIGNIFICANT CHANGE UP (ref 4.8–10.8)
WBC # BLD: 8.58 K/UL — SIGNIFICANT CHANGE UP (ref 4.8–10.8)
WBC # FLD AUTO: 6.45 K/UL — SIGNIFICANT CHANGE UP (ref 4.8–10.8)
WBC # FLD AUTO: 7.02 K/UL — SIGNIFICANT CHANGE UP (ref 4.8–10.8)
WBC # FLD AUTO: 8.58 K/UL — SIGNIFICANT CHANGE UP (ref 4.8–10.8)

## 2021-07-30 PROCEDURE — 93010 ELECTROCARDIOGRAM REPORT: CPT

## 2021-07-30 PROCEDURE — 74018 RADEX ABDOMEN 1 VIEW: CPT | Mod: 26

## 2021-07-30 PROCEDURE — 99233 SBSQ HOSP IP/OBS HIGH 50: CPT

## 2021-07-30 PROCEDURE — 71045 X-RAY EXAM CHEST 1 VIEW: CPT | Mod: 26

## 2021-07-30 RX ORDER — PANTOPRAZOLE SODIUM 20 MG/1
8 TABLET, DELAYED RELEASE ORAL
Qty: 80 | Refills: 0 | Status: DISCONTINUED | OUTPATIENT
Start: 2021-07-30 | End: 2021-08-02

## 2021-07-30 RX ORDER — CEFTRIAXONE 500 MG/1
2000 INJECTION, POWDER, FOR SOLUTION INTRAMUSCULAR; INTRAVENOUS EVERY 24 HOURS
Refills: 0 | Status: DISCONTINUED | OUTPATIENT
Start: 2021-07-30 | End: 2021-08-03

## 2021-07-30 RX ORDER — MEROPENEM 1 G/30ML
1000 INJECTION INTRAVENOUS EVERY 8 HOURS
Refills: 0 | Status: DISCONTINUED | OUTPATIENT
Start: 2021-07-30 | End: 2021-07-30

## 2021-07-30 RX ORDER — ACETAMINOPHEN 500 MG
650 TABLET ORAL ONCE
Refills: 0 | Status: COMPLETED | OUTPATIENT
Start: 2021-07-30 | End: 2021-07-30

## 2021-07-30 RX ORDER — MEROPENEM 1 G/30ML
INJECTION INTRAVENOUS
Refills: 0 | Status: DISCONTINUED | OUTPATIENT
Start: 2021-07-30 | End: 2021-07-30

## 2021-07-30 RX ORDER — MEROPENEM 1 G/30ML
1000 INJECTION INTRAVENOUS ONCE
Refills: 0 | Status: COMPLETED | OUTPATIENT
Start: 2021-07-30 | End: 2021-07-30

## 2021-07-30 RX ORDER — METRONIDAZOLE 500 MG
500 TABLET ORAL EVERY 8 HOURS
Refills: 0 | Status: DISCONTINUED | OUTPATIENT
Start: 2021-07-30 | End: 2021-08-03

## 2021-07-30 RX ORDER — METRONIDAZOLE 500 MG
500 TABLET ORAL ONCE
Refills: 0 | Status: COMPLETED | OUTPATIENT
Start: 2021-07-30 | End: 2021-07-30

## 2021-07-30 RX ORDER — METRONIDAZOLE 500 MG
TABLET ORAL
Refills: 0 | Status: DISCONTINUED | OUTPATIENT
Start: 2021-07-30 | End: 2021-08-03

## 2021-07-30 RX ORDER — PANTOPRAZOLE SODIUM 20 MG/1
40 TABLET, DELAYED RELEASE ORAL
Refills: 0 | Status: DISCONTINUED | OUTPATIENT
Start: 2021-07-30 | End: 2021-07-30

## 2021-07-30 RX ADMIN — SODIUM CHLORIDE 75 MILLILITER(S): 9 INJECTION, SOLUTION INTRAVENOUS at 10:57

## 2021-07-30 RX ADMIN — PANTOPRAZOLE SODIUM 10 MG/HR: 20 TABLET, DELAYED RELEASE ORAL at 10:38

## 2021-07-30 RX ADMIN — ONDANSETRON 4 MILLIGRAM(S): 8 TABLET, FILM COATED ORAL at 13:41

## 2021-07-30 RX ADMIN — Medication 650 MILLIGRAM(S): at 02:30

## 2021-07-30 RX ADMIN — Medication 100 MILLIGRAM(S): at 13:41

## 2021-07-30 RX ADMIN — CEFEPIME 100 MILLIGRAM(S): 1 INJECTION, POWDER, FOR SOLUTION INTRAMUSCULAR; INTRAVENOUS at 05:12

## 2021-07-30 RX ADMIN — Medication 650 MILLIGRAM(S): at 17:56

## 2021-07-30 RX ADMIN — Medication 100 MILLIGRAM(S): at 21:42

## 2021-07-30 RX ADMIN — CEFTRIAXONE 100 MILLIGRAM(S): 500 INJECTION, POWDER, FOR SOLUTION INTRAMUSCULAR; INTRAVENOUS at 15:05

## 2021-07-30 RX ADMIN — MEROPENEM 100 MILLIGRAM(S): 1 INJECTION INTRAVENOUS at 11:12

## 2021-07-30 RX ADMIN — ATORVASTATIN CALCIUM 80 MILLIGRAM(S): 80 TABLET, FILM COATED ORAL at 21:42

## 2021-07-30 RX ADMIN — Medication 650 MILLIGRAM(S): at 17:00

## 2021-07-30 RX ADMIN — Medication 650 MILLIGRAM(S): at 02:00

## 2021-07-30 RX ADMIN — SENNA PLUS 2 TABLET(S): 8.6 TABLET ORAL at 21:42

## 2021-07-30 RX ADMIN — Medication 100 MILLIGRAM(S): at 05:12

## 2021-07-30 NOTE — PROGRESS NOTE ADULT - ASSESSMENT
Perri Zhu  : 1960 [facility] [address] [facility]  [address], [facility], [address] [place] [address]  [phone]   [phone]          OUTPATIENT PHYSICAL THERAPY:Daily Note and Progress Report 2017    ICD-10: Treatment Diagnosis: I 89.0 lymphedema not elsewhere classified  M 62.81 muscle weakness generalized  Precautions/Allergies:   Review of patient's allergies indicates no known allergies. Fall Risk Score: 1 (? 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 Alisson Rodriguez presents for a lymphedema assessment due to edema of bilateral lower extremities. She has pitting edema in the bilateral lower extremities. She has previously had short stretch bandaging and MLD following knee surgery 3 years ago. She would like to try this again even though this is not orthopedic post surgical swelling. She was diagnosed with lymphoma in March of this year. She recently completed her second chemo of 6 planned. She will have a PET scan 17. Progress with chemo has been limited due to lab values being abnormal.  She is uncomfortable due to the edema. She will have a paracentesis 17. We will initiate edema management and progress slowly dependent on her tolerance to compression and her response to treatment. We have now initiated conditioning and strengthening. She is independent with home management of edema. PROBLEM LIST (Impacting functional limitations):  1. Decreased Strength  2. Increased Pain  3. Decreased Activity Tolerance  4. Increased Fatigue  5. Increased Shortness of Breath  6. Decreased Flexibility/Joint Mobility  7. Edema/Girth  8. Decreased Knowledge of Precautions  9.  Decreased [place] with Home Exercise Program INTERVENTIONS PLANNED:  1. Decongestion Therapy  2. Home Exercise Program (HEP)  3. Range of Motion (ROM)  4. Therapeutic Exercise/Strengthening   TREATMENT PLAN:  Effective Dates: 8/14/17 TO 11/7/17. Frequency/Duration: 2 times a week for 12 weeks dependent on chemo schedule and counts, the patient has been out due to hospitalization. GOALS: (Goals have been discussed and agreed upon with patient.)  Short-Term Functional Goals: Time Frame: 4 weeks  1. The patient will have knowledge of signs and symptoms of lymphedema and how to manage within 4 weeks. Met   2. The patient will tolerate short stretch bandaging of bilateral lower extremities for reduction of girth within 4 weeks. Met   3. The patient and caregiver will be independent with compression bandaging within 4 weeks. Met   4. The patient will improve her 6 minute walk by 60 feet within 4 weeks. Met   5. The patient will report a fatigue of 3 or less within 4 weeks. Met   Discharge Goals: Time Frame: 8 weeks  1. The patient will have a girth decrease of at least 5 cm in the calf within 8 weeks. 2. The patient will be fit with static compression garments within 8 weeks. Met   3. The patient and caregiver will be independent with edema management within 8 weeks. Met  4. The patient will transition to Healthy Self within 8 weeks. 5.   Rehabilitation Potential For Stated Goals: [address] [place] [address]'s therapy, I certify that the treatment plan above will be carried out by a therapist or under their direction. Thank you for this referral,  Matthew Wall PT     Referring Physician Signature: Gardenia Toledo MD              Date                    The information in this section was collected on 5/24/17 (except where otherwise noted).   HISTORY:   History of Present Injury/Illness (Reason for Referral):  Lymphoma, ascites, bilateral lower extremity pitting edema  Past Medical History/Comorbidities:   Ms. Chan Risk  has a past medical history of Anemia; Arthritis; Basal cell carcinoma; Chronic pain; Hypertension (10/4/2011); Morbid obesity ([address] Murmur; Non Hodgkin's lymphoma ([address] (3/30/2017); Other ill-defined conditions; Overactive bladder; Rheumatic fever; Shingles; Thromboembolus ([address] (x2); Thyroid disease; and Unspecified adverse effect of anesthesia. She also has no past medical history of Aneurysm ([address] Arrhythmia; Asthma; Autoimmune disease ([address] CAD (coronary artery disease); Chronic kidney disease; Coagulation defects; COPD; Diabetes ([address] Difficult intubation; GERD (gastroesophageal reflux disease); Heart failure ([address] Liver disease; Malignant hyperthermia due to anesthesia; Nausea & vomiting; Pseudocholinesterase deficiency; Psychiatric disorder; PUD (peptic ulcer disease); Seizures ([facility] Stroke [facility]); or Unspecified sleep apnea. Ms. Cabrera Quintero  has a past surgical history that includes hc cholecystectomy fnc41; anesth,achilles tendon surg (2/10/2011); cholecystectomy (7029); orthopaedic (2012); orthopaedic (2013); and vascular access.    Past Medical History:   Diagnosis Date    Anemia     in high school, \"received gamma globulin twice a week for awhile\"    Arthritis     osteo-r knee    Basal cell carcinoma     Followed by Dermatology    Chronic pain     KNEEs    Hypertension 10/4/2011    medication    Morbid obesity ([address]     Murmur     \"had it my whole life\", did not appreciate murmur 9/12/2011 during assessment    Non Hodgkin's lymphoma ([address] 3/30/2017    Other ill-defined conditions     skin bumps-med as needed    Overactive bladder     Rheumatic fever     Possibly as a child    Shingles     Thromboembolus ([address] x2    LLE-calf-1990?-only took ASA-resolved in less than a wk-\"a little burned area-not examined\"    Thyroid disease     hypo-medication    Unspecified adverse effect of anesthesia     pt reports waking several times with knee surgery-spinal     Past Surgical History:   Procedure Laterality Date    [facility]. CHOLECYSTECTOMY FNC41      HX CHOLECYSTECTOMY  1983    HX ORTHOPAEDIC  2012    right TKA    HX ORTHOPAEDIC  2013    left TKA. Dr. Les Wu.  HX VASCULAR ACCESS      MO ANESTH,ACHILLES TENDON SURG  2/10/2011    LEFT. Dr. Olvera . Social History/Living Environment:     lives with family, 2 flights of stairs  Prior Level of Function/Work/Activity:    Dominant Side:         RIGHT  Current Medications:       Current Outpatient Prescriptions:     levothyroxine (SYNTHROID) 150 mcg tablet, Take 1 Tab by mouth Daily (before breakfast). , Disp: 90 Tab, Rfl: 3    magnesium oxide (MAG-OX) 400 mg tablet, Take 1 Tab by mouth two (2) times a day., Disp: 60 Tab, Rfl: 1    magic mouthwash (ALEXSANDER) susp, Take 10 mL by mouth every four (4) hours as needed. , Disp: 2 Bottle, Rfl: 2    nystatin (MYCOSTATIN) powder, Apply  to affected area three (3) times daily. , Disp: 60 g, Rfl: 2    fluticasone (FLONASE) 50 mcg/actuation nasal spray, 2 Sprays by Both Nostrils route daily. , Disp: 1 Bottle, Rfl: 1    fluconazole (DIFLUCAN) 200 mg tablet, Take 1 Tab by mouth daily. , Disp: 90 Tab, Rfl: 0    acyclovir (ZOVIRAX) 400 mg tablet, Take 1 Tab by mouth two (2) times a day., Disp: 60 Tab, Rfl: 3    allopurinol (ZYLOPRIM) 300 mg tablet, Take 1 Tab by mouth two (2) times a day. (Patient taking differently: Take 300 mg by mouth daily.), Disp: 90 Tab, Rfl: 2    loratadine (CLARITIN) 10 mg tablet, Take 10 mg by mouth., Disp: , Rfl:     pregabalin (LYRICA) 100 mg capsule, Take 1 Cap by mouth three (3) times daily. Max Daily Amount: 300 mg., Disp: 90 Cap, Rfl: 3    oxyCODONE (OXYIR) 5 mg capsule, Take 1-2 Caps by mouth every four (4) hours as needed. Max Daily Amount: 60 mg., Disp: 180 Cap, Rfl: 0    omeprazole (PRILOSEC) 20 mg capsule, TAKE 1 CAPSULE DAILY, Disp: 90 Cap, Rfl: 2    lidocaine-prilocaine (EMLA) topical cream, Apply  to affected area as needed for Pain.  Apply to port site 45-60 minutes prior to lab Patient is a 60 y/o M w/ PMH of HTN, CAD s/p PCI (9/25/19, Dr. Mcclure) on aspirin and brilinta (ticagrelor), cholecystitis s/p cholecystectomy by Dr Avalos in 2020 presents with fever, chills and severe epigastric abdominal pain x 1 day and admitted to ICU for management of septic shock secondary to cholangitis.      #Septic shock  #Cholangitis s/p ERCP with stent placement  -Keep NPO for today  -Monitor for post-ERCP complications  -Continue IV antibiotics, on ceftriaxone and flagyl per ID  -7/28 Bcx +ve for Klebsiella (non-ESBL)  -Repeat ERCP as outpatient for stent removal and extension of sphincterotomy  -c/w levophed  -c/w IVF @ 75cc/hr  - c/w tylenol PRN if spikes fever  - morphine PRN for pain    #Hgb drop in setting of hematemesis  - repeat CBC shows hgb stable at 12  - no need for scope at this time  - continue to monitor on PM labs  - holding ASA, brilinta, and AC for now    #Nausea and vomiting  - c/w zofran PRN for nausea/vomiting    #h/o CAD s/p PCI in 2019  - holding ASA, brilinta, and AC for now  - c/s atorvastatin      #Handoff  - f/up GI recs to restart diet, DAPT, and AC           appt or infusion. , Disp: 30 g, Rfl: 0    promethazine (PHENERGAN) 25 mg tablet, Take 25 mg by mouth every six (6) hours as needed for Nausea. Unsure of dose, Disp: , Rfl:    Date Last Reviewed:  5/24/17   Number of Personal Factors/Comorbidities that affect the Plan of Care: 3+: HIGH COMPLEXITY   EXAMINATION:   Palpation:          Pitting edema, dry skin, loose skin  ROM:          Within functional limits. She previously has had bilateral knee replacements and an Achilles tendon repair on the left  Strength:          Grossly 4+/5 x 4 extremities  Functional Mobility:         Gait/Ambulation:  Independent with increased speed        Transfers: independent        Bed Mobility:  independent  Skin Integrity:          Intact, but dry. Edema/Girth:  pitting    Left Right    Initial Most Recent Initial Most Recent   Upper  Extremity           Lower  Extremity               Body Structures Involved:  1. Muscles  2. lymphatic system Body Functions Affected:  1. Sensory/Pain  2. Skin Related  3. lymphatic system Activities and Participation Affected:  1. General Tasks and Demands  2. Mobility  3. Self Care   Number of elements (examined above) that affect the Plan of Care: 4+: HIGH COMPLEXITY   CLINICAL PRESENTATION:   Presentation: Evolving clinical presentation with unstable and unpredictable characteristics: HIGH COMPLEXITY   CLINICAL DECISION MAKING:   Outcome Measure: Tool Used: NCCN Distress Thermometer   Score:  Initial:   Most Recent: X    Interpretation of Score: If greater than or equal to 8, then PHQ-9 Depression Scale Score   and JOON-7 Anxiety Scale Score  .   Tool Used: ECOG Performance Survey Score  Score:  Initial: 2 Most Recent:  1    Interpretation of Score:   0 Fully active, able to carry on all pre-disease performance without restriction   1 Restricted in physically strenuous activity but ambulatory and able to carry out work of a light or sedentary nature, e.g., light house work, office work   2 Ambulatory and capable of all selfcare but unable to carry out any work activities. Up and about more than 50% of waking hours   3 Capable of only limited selfcare, confined to bed or chair more than 50% of waking hours   4 Completely disabled. Cannot carry on any selfcare. Totally confined to bed or chair   5 Dead    Tool Used: 6-MINUTE WALK TEST  Score:  Initial: 1110 feet Most Recent: 1631 feet (Date: 9/26/17 )   Interpretation of Score: Normal range varies but is approximately 9367-3665 Feet      Distance walked: 1631 feet     Baseline End of Test   Heart Rate 93 116   Dyspnea (Luther Scale)     Fatigue (Luther Scale) 2 2   SpO2 98 98   /78 178/80     Score 2133 [phone] [phone] 426-16 15-0   Modifier CH CI CJ CK CL CM CN       Tool Used: Timed Up and Go (TUG)  Score:  Initial: 8 seconds Most Recent: 6 seconds (Date: 9/26/17 )   Interpretation of Score: The test measures, in seconds, the time taken by an individual to stand up from a standard arm chair (seat height 46 cm [18 in], arm height 65 cm [25.6 in]), walk a distance of 3 meters (118 in, approx 10 ft), turn, walk back to the chair and sit down. If the individual takes longer than 14 seconds to complete TUG, this indicates risk for falls. Score 7 7.5-10.5 11-14 14.5-17.5 18-21 21.5-24.5 25+   Modifier CH CI CJ CK CL CM CN         Tool Used: Lymphedema Life Impact Scale   Score:  Initial:  54 Most Recent:  (Date: 9/26/17 )   Interpretation of Score: The Lymphedema Life Impact Scale (LLIS) is a validated instrument that measures the physical, functional, and psychosocial concerns pertinent to patients with extremity lymphedema. The Scale's questionnaire is administered to patients to gauge impairments, activity limitations, and participation restrictions resulting from their lymphedema.   Score 0 1-13 14-26 27-40 41-54 55-67 68   Modifier CH CI CJ CK CL CM CN       Medical Necessity:   · Patient is expected to demonstrate progress in strength and edema management to increase independence with self care and houseold activity. Reason for Services/Other Comments:  · Patient continues to demonstrate capacity to improve strength and edema management which will increase independence. Use of outcome tool(s) and clinical judgement create a POC that gives a: Questionable prediction of patient's progress: MODERATE COMPLEXITY            TREATMENT:   (In addition to Assessment/Re-Assessment sessions the following treatments were rendered)  Pre-treatment Symptoms/Complaints:  Fatigue, pain weakness, lower extremity edema     Pain: Initial: 5/10         Post Session: 2 /10    47 min   6 minute walk, TUG  O2 98 HR 93  Bp 116/78  Fatigue 2/10  UBE level 1 x 4 min  Nustep level 2 x 7 min O2 99 HR   Sit to stand x 10 reps  Bilateral upper extremity with 2# x 10 reps biceps curls, forward flexion, abduction, triceps extension   Long arc quads x 10 repos with 5 count hold  Fatigue 2/10      as above    Treatment/Session Assessment:    · Response to Treatment:  Tolerated the treatment well. · Compliance with Program/Exercises: Will assess as treatment progresses. · Recommendations/Intent for next treatment session: \"Next visit will focus on conditioning and strengthening\".        Total Treatment Duration:  PT Patient Time In/Time Out  Time In: 0933  Time Out: [address], PT

## 2021-07-30 NOTE — CONSULT NOTE ADULT - ASSESSMENT
ASSESSMENT   62 y/o M w/ PMH of HTN, CAD s/p PCI (9/25/19, Dr. Mcclure) on aspirin and brilinta, cholecystitis s/p cholecystectomy by Dr Avalos in 2020 presents with fever, chills and severe epigastric abdominal pain x 1 day. Found to have cholangitis and Kleb bacteremia     IMPRESSION  #Septic shock requiring pressors secondary to Klebsiella bacteremia secondary to cholangitis     s/p ERCP with stent    7/28 BCX Kleb (non-ESBL by PCR)    RUQ Postcholecystectomy without evidence of CBD dilatation.    CTAP Mild inflammatory changes about the nondilated CBD may reflect evidence of cholangitis.  #Lactic acidosis  #Transaminitis   #Bilateral dependent atelectatic changes    Creatinine, Serum: 1.0 (07-30-21 @ 05:00)  Weight (kg): 95.3 (07-29-21 @ 11:20)    RECOMMENDATIONS.  - Klebsiella is non-ESBL by PCR, Ceftriaxone 2g q24h IV & Flagyl 500mg q8h IV  - No need for repeat BCX as GNR bacteremia   - GI following  - Trend fever curve, WBC, LFTs  - f/u final kleb sensitivities     If any questions, please call or send a message on [employer] Teams  Please continue to update ID with any pertinent new laboratory or radiographic findings  Spectra [id number]

## 2021-07-30 NOTE — CONSULT NOTE ADULT - SUBJECTIVE AND OBJECTIVE BOX
VICKY SPENCER  61y, Male  Allergy: No Known Allergies      CHIEF COMPLAINT:   cholangitis (2021 09:16)      LOS  1d    HPI  HPI:  patient is a 62 y/o M w/ PMH of HTN, CAD s/p PCI (19, Dr. Mcclure) on aspirin and brilinta, cholecystitis s/p cholecystectomy by Dr Avalos in  presents with fever, chills and severe epigastric abdominal pain x 1 day. per patient developed severe epigastric abdominal pain yesterday morning (2021), he went to our [facility] for evaluation, he was told he has gas around his pancrease and was discharged home in the afternoon. Then later in the evening around 9pm pt developed shaking chills with low fever with persistent epigastric pain, associated with nausea and non bilious vomiting x 2 so he came in to the hospital for further evaluation.     in ED. patient was found to be febrile with Tmax of 106, hypotensive with BP of 80/50s, and tachycardiac to the 150s. patient was started on levophed for BP support. received 3L of fluid bolus in the ED. his CT abdomen and pelvis is significant for Mild inflammatory changes about the nondilated CBD which may reflect cholangitis. T bili 4.7.    Patient was seen and examined bedside. at the time of examination patient was fatigued however in no acute distress. patient states his epigastric pain is mostly controlled.    I discussed patient's case with GI on call   (2021 03:33)      INFECTIOUS DISEASE HISTORY:  ID consulted for klebsiella bacteremia secondary to cholangitis, s/p ERCP with stent  required pressors  still feeling nauseous     PMH  PAST MEDICAL & SURGICAL HISTORY:  CAD in native artery  stents 2019    HTN (hypertension)    No significant past surgical history        FAMILY HISTORY  FH: aortic dissection        SOCIAL HISTORY  Social History:  Denies smoking, EtOH or drug use (24 Sep 2019 22:07)        ROS  General: Denies rigors, nightsweats  HEENT: Denies headache, rhinorrhea, sore throat, eye pain  CV: Denies CP, palpitations  PULM: Denies wheezing, hemoptysis  GI: as noted above   : Denies discharge, hematuria  MSK: Denies arthralgias, myalgias  SKIN: Denies rash, lesions  NEURO: Denies paresthesias, weakness  PSYCH: Denies depression, anxiety     VITALS:  T(F): 98.6, Max: 101.5 (21 @ 02:00)  HR: 92  BP: 130/72  RR: 18Vital Signs Last 24 Hrs  T(C): 37 (2021 08:00), Max: 38.6 (2021 02:00)  T(F): 98.6 (2021 08:00), Max: 101.5 (2021 02:00)  HR: 92 (2021 11:00) (74 - 116)  BP: 130/72 (2021 11:00) (83/51 - 147/56)  BP(mean): 95 (2021 11:00) (58 - 103)  RR: 18 (2021 11:00) (11 - 27)  SpO2: 93% (2021 11:00) (82% - 100%)    PHYSICAL EXAM:  Gen: NAD, resting in bed  HEENT: Normocephalic, atraumatic icteric  Neck: supple, no lymphadenopathy  CV: Regular rate & regular rhythm  Lungs: decreased BS at bases, no fremitus  Abdomen: Soft, BS present, mild RUQ tenderness to palpation   Ext: Warm, well perfused  Neuro: non focal, awake  Skin: no rash, no erythema  Lines: no phlebitis   TESTS & MEASUREMENTS:                        12.2   7.02  )-----------( 108      ( 2021 11:00 )             37.4         139  |  107  |  13  ----------------------------<  116<H>  4.0   |  23  |  1.0    Ca    7.9<L>      2021 05:00  Mg     1.8         TPro  4.8<L>  /  Alb  3.0<L>  /  TBili  5.5<H>  /  DBili  x   /  AST  109<H>  /  ALT  240<H>  /  AlkPhos  179<H>      eGFR if Non African American: 81 mL/min/1.73M2 (21 @ 05:00)  eGFR if African American: 94 mL/min/1.73M2 (21 @ 05:00)    LIVER FUNCTIONS - ( 2021 05:00 )  Alb: 3.0 g/dL / Pro: 4.8 g/dL / ALK PHOS: 179 U/L / ALT: 240 U/L / AST: 109 U/L / GGT: x           Urinalysis Basic - ( 2021 00:00 )    Color: Yellow / Appearance: Slightly Turbid / S.016 / pH: x  Gluc: x / Ketone: Trace  / Bili: Negative / Urobili: <2 mg/dL   Blood: x / Protein: Negative / Nitrite: Negative   Leuk Esterase: Negative / RBC: 2 /HPF / WBC 0 /HPF   Sq Epi: x / Non Sq Epi: 0 /HPF / Bacteria: Negative        Culture - Urine (collected 21 @ 00:00)  Source: Clean Catch Clean Catch (Midstream)  Final Report (21 @ 10:33):    No growth    Culture - Blood (collected 21 @ 20:49)  Source: .Blood Blood-Peripheral  Gram Stain (21 @ 13:23):    Growth in aerobic and anaerobic bottles: Gram Negative Rods  Preliminary Report (21 @ 13:23):    Growth in aerobic and anaerobic bottles: Gram Negative Rods    Culture - Blood (collected 21 @ 20:49)  Source: .Blood Blood-Peripheral  Gram Stain (21 @ 14:58):    Growth in anaerobic bottle: Gram Negative Rods    Growth in aerobic bottle: Gram Negative Rods  Preliminary Report (21 @ 11:56):    Growth in aerobic and anaerobic bottles: Klebsiella pneumoniae    ***Blood Panel PCR results on this specimen are available    approximately 3 hours after the Gram stain result.***    Gram stain, PCR, and/or culture results may not always    correspond dueto difference in methodologies.    ************************************************************    This PCR assay was performed by multiplex PCR. This    Assay tests for 66 bacterial and resistance gene targets.    Please refer to the [facility] test directory    at [email or web address] for details.  Organism: Blood Culture PCR (21 @ 15:25)  Organism: Blood Culture PCR (21 @ 15:25)      -  Klebsiella pneumoniae: Detec      Method Type: PCR        Blood Gas Venous - Lactate: 2.1 mmoL/L (21 @ 20:42)      INFECTIOUS DISEASES TESTING  COVID-19 PCR: NotDetec (21 @ 21:15)      INFLAMMATORY MARKERS      RADIOLOGY & ADDITIONAL TESTS:  I have personally reviewed the last Chest xray  CXR  Xray Chest 1 View AP/PA:   EXAM:  XR CHEST 1 VIEW            PROCEDURE DATE:  2021            INTERPRETATION:  Clinical History/Reason for Exam:  s/p central line    Comparison: Chest x-ray 2021.      Findings:    Technique/Positioning:  Frontal radiograph of the chest.    Support devices:  Right IJ catheter tip overlies SVC.    Cardiac/mediastinum/hilum: Unremarkable    Lung parenchyma/ Pleura: Lower lung volumes. No consolidation, effusion pneumothorax.      Skeleton/soft tissues: Stable      Impression:    Right IJ catheter tip overlies SVC.      Lower lung volumes. No consolidation, effusion pneumothorax.    --- End of Report ---              EDY MALONE MD; Attending Radiologist  This document has been electronically signed. 2021  1:03PM (21 @ 01:42)      CT  CT Chest w/ IV Cont:   EXAM:  CT ABDOMEN AND PELVIS IC        EXAM:  CT CHEST IC            PROCEDURE DATE:  2021            INTERPRETATION:  CLINICAL HISTORY / REASON FOR EXAM: Sepsis.    TECHNIQUE: Multislice helical sections were obtained from the thoracic inletto the pubic symphysis following administration of 100 mL Optiray 320 intravenous contrast. Oral contrast was administered. Coronal and sagittal reformatted images are also submitted.  3-D/MIP postprocessing images were performed as well.    COMPARISON: Chest CT dated 2020 and abdominal and pelvic CT dated 3/11/2020 .    FINDINGS:    TUBES/LINES: None.    LUNGS, PLEURA, AND AIRWAYS: No lobar consolidation, mass, effusion or pneumothorax is present. No evidence of endobronchial obstruction, bronchiectasis or honeycombing. Bilateral dependent atelectatic changes. Bilateral centrilobular emphysematous changes predominantly involving lung apices. Unchanged 5 mm nodule in the right middle lobe and left lower lobe.    MEDIASTINUM/LYMPH NODES: No mediastinal or axillary lymphadenopathy.    HEART/GREAT VESSELS: No pericardial effusion. Heart size unremarkable. Coronary artery and thoracic aorta atherosclerotic calcifications. No aneurysmal dilation of the thoracic aorta.    HEPATOBILIARY: Postcholecystectomy. Several subcentimeter hypodensities in the right and left lobe of the liver. No intra or extrahepatic biliary ductal dilatation. However, there are mild inflammatory changes about the CBD with associated wall hyperemia.    SPLEEN: Unremarkable.    PANCREAS: Unremarkable.    ADRENAL GLANDS: Unremarkable.    KIDNEYS: Unremarkable.    ABDOMINOPELVIC NODES: Unremarkable.    PELVIC ORGANS: Phillips catheter is noted within the urinary bladder    PERITONEUM/MESENTERY/BOWEL: Colonic diverticulosis. No bowel obstruction. No ascites. Oral contrast has reached the rectum. Normal caliber appendix.    BONES/SOFT TISSUES: Stable anterior chest subcutaneous 2.5 cm probable sebaceous cyst. Degenerative change, lumbar spine.    OTHER: Vascularcalcifications.      IMPRESSION:      Mild inflammatory changes about the nondilated CBD may reflect evidence of cholangitis.    Otherwise, no evidence of acute thoracic, abdominal or pelvic pathology.    Stable pulmonary nodules.      --- End of Report ---              DALLAS FLORES MD; Attending Radiologist  This document has been electronically signed. 2021  2:13AM (21 @ 01:28)      CARDIOLOGY TESTING  12 Lead ECG:   Ventricular Rate 142 BPM    Atrial Rate 142 BPM    P-R Interval 152 ms    QRS Duration 88 ms    Q-T Interval 342 ms    QTC Calculation(Bazett) 526 ms    P Axis 88 degrees    R Axis 88 degrees    T Axis 72 degrees    Diagnosis Line Sinus tachycardia  Nonspecific ST abnormality  Abnormal ECG    Confirmed by Greg Powell ([id number]) on 2021 6:06:57 AM (21 @ 21:17)      MEDICATIONS  atorvastatin 80 Oral at bedtime  chlorhexidine 4% Liquid 1 Topical daily  lactated ringers. 1000 IV Continuous <Continuous>  meropenem  IVPB     meropenem  IVPB 1000 IV Intermittent every 8 hours  norepinephrine Infusion 0.05 IV Continuous <Continuous>  pantoprazole Infusion 8 IV Continuous <Continuous>  senna 2 Oral at bedtime        ANTIBIOTICS:  meropenem  IVPB      meropenem  IVPB 1000 milliGRAM(s) IV Intermittent every 8 hours      ALLERGIES:  No Known Allergies

## 2021-07-30 NOTE — PROGRESS NOTE ADULT - ASSESSMENT
patient is a 62 y/o M w/ PMH of HTN, CAD s/p PCI (9/25/19, Dr. Mcclure) on aspirin and brilinta, cholecystitis s/p cholecystectomy by Dr Avalos in 2020 presents with fever, chills and severe epigastric abdominal pain x 1 day.     IMPRESSION:  Sepsis present on admission  Septic Shock/ klebsiella bacteremia  acute cholangitis sp ERCP  HO CAD s/p PCI in 2019  HO Cholecystectomy  Pulmonary nodules  UGI    PLAN:    CNS: Avoid CNS depressants. Pain control    HEENT: Oral care    PULMONARY:  HOB @ 45 degrees.  Keep O2 > 94%. aspiration precautions Pulmonary toilet.     CARDIOVASCULAR: Fluid resuscitation with LR at 75cc/hr. cardio f/up    GI: GI prophylaxis.  NPO.  GI if EGD    RENAL:  Follow up lytes.  Correct as needed    INFECTIOUS DISEASE: Follow up cultures. Meropenem, ID eval    HEMATOLOGICAL:  DVT prophylaxis with SCDs for now.    ENDOCRINE:  Follow up FS.  Insulin protocol if needed.    MUSCULOSKELETAL: bed rest    Lines: Right IJ CVC 7/29  Dispo: MICU  full code

## 2021-07-30 NOTE — PROGRESS NOTE ADULT - SUBJECTIVE AND OBJECTIVE BOX
Patient is a 62 y/o with PMHx of HTN, CAD s/p PCI (9/25/19, Dr. Mcclure) on aspirin and Brilinta cholecystitis s/p cholecystectomy by Dr Avalos in 2020 who presented to [facility] with abdominal pain. Patient met SIRS criteria on presentation and high clinical suspicion remained for cholangitis. Patient s/p ERCP with sphincterotomy, drainage of pus from CBD, and placement of a Biliary stent. Patient had an episode of dark emesis overnight but vitals and CBC remain relatively stable. Patient does feel better since admission.     PAST MEDICAL & SURGICAL HISTORY:  CAD requring PCI with stents   HTN (hypertension)  CCY      MEDICATIONS  (STANDING):  atorvastatin 80 milliGRAM(s) Oral at bedtime  cefepime   IVPB 2000 milliGRAM(s) IV Intermittent every 8 hours  lactated ringers Bolus 1000 milliLiter(s) IV Bolus once  lactated ringers. 1000 milliLiter(s) (150 mL/Hr) IV Continuous <Continuous>  metroNIDAZOLE  IVPB      metroNIDAZOLE  IVPB 500 milliGRAM(s) IV Intermittent once  metroNIDAZOLE  IVPB 500 milliGRAM(s) IV Intermittent every 8 hours  norepinephrine Infusion 0.05 MICROgram(s)/kG/Min (9.36 mL/Hr) IV Continuous <Continuous>  senna 2 Tablet(s) Oral at bedtime    MEDICATIONS  (PRN):  morphine  - Injectable 2 milliGRAM(s) IV Push every 4 hours PRN Moderate Pain (4 - 6)  ondansetron Injectable 4 milliGRAM(s) IV Push every 6 hours PRN Nausea and/or Vomiting      Allergies  No Known Allergies      Review of Systems  General:  See HPI  HEENT: Denies Trouble Swallowing ,Denies  Sore Throat , Denies Change in hearing/vision/speech ,Denies Dizziness    Cardio: Denies  Chest Pain , Palpitations    Respiratory: Denies worsening of SOB, Denies Cough  Abdomen: See detailed HPI  Neuro: Denies Headache Denies Dizziness, Denies Paresthesias  MSK: Denies pain in Bones/Joints/Muscles   Psych: Patient denies depression, denies suicidal or homicidal ideations  Integ: Patient Denies rash, or new skin lesions       Vital Signs   T(F): 100 (29 Jul 2021 06:00), Max: 106.5 (28 Jul 2021 20:25)  HR: 92 (29 Jul 2021 06:00) (92 - 152)  BP: 109/68 (29 Jul 2021 06:00) (82/48 - 136/63)  BP(mean): 84 (29 Jul 2021 06:00) (60 - 84)  RR: 18 (29 Jul 2021 06:00) (17 - 20)  SpO2: 100% (29 Jul 2021 06:00) (96% - 100%)  Physical Exam  Gen: NAD, diaphoretic   HEENT: NC/AT, Mucosal Membranes Dry  Cardio: S1/S2 No S3/S4, Tachy regular  Resp: CTA B/L  Abdomen: Soft, ND/NT  Neuro: AAOx3  Extremities: FROM x 4    LABS:                        15.9   4.69  )-----------( 165      ( 28 Jul 2021 20:49 )             47.9     07-28    136  |  101  |  18  ----------------------------<  124<H>  4.2   |  22  |  1.1    Ca    9.4      28 Jul 2021 20:49    TPro  7.2  /  Alb  4.5  /  TBili  4.7<H>  /  DBili  x   /  AST  493<H>  /  ALT  470<H>  /  AlkPhos  307<H>  07-28        Lipase:24         Blood Gas Venous - Lactate: 2.1 mmoL/L (07-28-21 @ 20:42)      Coags:     14.70  ----< 1.28    ( 28 Jul 2021 20:49 )     27.5        RADIOLOGY & ADDITIONAL STUDIES:  US Abdomen Upper Quadrant Right 07.29.21   IMPRESSION:    Postcholecystectomy without evidence of CBD dilatation.  CBD 5mm    CT of Abdomen   IMPRESSION:  Mild inflammatory changes about the nondilated CBD may reflect evidence of cholangitis.    Otherwise, no evidence of acute thoracic, abdominal or pelvic pathology.    Stable pulmonary nodules.

## 2021-07-30 NOTE — PROGRESS NOTE ADULT - SUBJECTIVE AND OBJECTIVE BOX
OVERNIGHT EVENTS: events noted, on levophed 0.01 and  cc/h, sp ERCP stent , 1 episode of vomiting blood, low grade T    Vital Signs Last 24 Hrs  T(C): 37 (2021 08:00), Max: 38.6 (2021 02:00)  T(F): 98.6 (2021 08:00), Max: 101.5 (2021 02:00)  HR: 86 (2021 08:00) (74 - 116)  BP: 114/65 (2021 08:00) (83/51 - 147/56)  BP(mean): 81 (2021 08:00) (58 - 97)  RR: 20 (2021 08:00) (11 - 25)  SpO2: 96% (2021 08:00) (82% - 100%)    PHYSICAL EXAMINATION:    GENERAL: ill looking    HEENT: Head is normocephalic and atraumatic. icteric sclera    NECK: Supple.    LUNGS: dec bs both bases    HEART: Regular rate and rhythm without murmur.    ABDOMEN: Soft, RUQ tenderness    EXTREMITIES: Without any cyanosis, clubbing, rash, lesions or edema.    NEUROLOGIC: Grossly intact.    SKIN: No ulceration or induration present.      LABS:                        12.1   8.58  )-----------( 108      ( 2021 05:00 )             36.9     07    139  |  107  |  13  ----------------------------<  116<H>  4.0   |  23  |  1.0    Ca    7.9<L>      2021 05:00  Mg     1.8         TPro  4.8<L>  /  Alb  3.0<L>  /  TBili  5.5<H>  /  DBili  x   /  AST  109<H>  /  ALT  240<H>  /  AlkPhos  179<H>  30    PT/INR - ( 2021 20:49 )   PT: 14.70 sec;   INR: 1.28 ratio         PTT - ( 2021 20:49 )  PTT:27.5 sec  Urinalysis Basic - ( 2021 00:00 )    Color: Yellow / Appearance: Slightly Turbid / S.016 / pH: x  Gluc: x / Ketone: Trace  / Bili: Negative / Urobili: <2 mg/dL   Blood: x / Protein: Negative / Nitrite: Negative   Leuk Esterase: Negative / RBC: 2 /HPF / WBC 0 /HPF   Sq Epi: x / Non Sq Epi: 0 /HPF / Bacteria: Negative                        21 @ 07:01  -  21 @ 07:00  --------------------------------------------------------  IN: 4090.7 mL / OUT: 2300 mL / NET: 1790.7 mL    21 @ 07:01  -  21 @ 09:17  --------------------------------------------------------  IN: 307.2 mL / OUT: 0 mL / NET: 307.2 mL        MICROBIOLOGY:  Culture Results:   Growth in anaerobic bottle: Gram Negative Rods  Growth in aerobic bottle: Gram Negative Rods  ***Blood Panel PCR results on this specimen are available  approximately 3 hours after the Gram stain result.***  Gram stain, PCR, and/or culture results may not always  correspond due to difference in methodologies.  ************************************************************  This PCR assay was performed by multiplex PCR. This  Assay tests for 66 bacterial and resistance gene targets.  Please refer to the [facility] test directory  at [email or web address] for details. ( @ 20:49)  Culture Results:   Growth in aerobic and anaerobic bottles: Gram Negative Rods ( @ 20:49)      MEDICATIONS  (STANDING):  atorvastatin 80 milliGRAM(s) Oral at bedtime  cefepime   IVPB 2000 milliGRAM(s) IV Intermittent every 8 hours  chlorhexidine 4% Liquid 1 Application(s) Topical daily  lactated ringers. 1000 milliLiter(s) (150 mL/Hr) IV Continuous <Continuous>  metroNIDAZOLE  IVPB      metroNIDAZOLE  IVPB 500 milliGRAM(s) IV Intermittent every 8 hours  norepinephrine Infusion 0.05 MICROgram(s)/kG/Min (9.36 mL/Hr) IV Continuous <Continuous>  senna 2 Tablet(s) Oral at bedtime    MEDICATIONS  (PRN):  acetaminophen    Suspension .. 650 milliGRAM(s) Oral every 6 hours PRN Temp greater or equal to 38C (100.4F), Moderate Pain (4 - 6)  morphine  - Injectable 2 milliGRAM(s) IV Push every 4 hours PRN Moderate Pain (4 - 6)  ondansetron Injectable 4 milliGRAM(s) IV Push every 6 hours PRN Nausea and/or Vomiting      RADIOLOGY & ADDITIONAL STUDIES:

## 2021-07-30 NOTE — PROGRESS NOTE ADULT - ASSESSMENT
Patient  is a 60 y/o with PMHx of HTN, CAD s/p PCI (9/25/19, Dr. Mcclure) on aspirin and Brilinta cholecystitis s/p cholecystectomy by Dr Avalos in 2020 who presented to [facility] with abdominal pain. Patient notes that pain started 24 hours prior, sharp, severe 10/10, sudden in onset, without know precipitating factor. Patient along with pain noted fever and in the ED had a T max of 106 and was also found hypotensive and tachycardic. While he does not have CBD dilation he had some inflammation noted around the CBD on CT scan as well as an elevated T quynh to 4.7. Patient had ERCP on 7/29 with sphincterotomy, removal of puss, and placement of a CBD stent. Patient had an isolated episode of hematemesis overnight but counts are stable. Seen by ID today, has gram negative bacteremia.     Abdominal Pain/ Cholangitis  - Met SIRS criteria in ED  - CT scan with CBD of 5 mm but inflammation noted around the CBD  - Patient admitted to ICU   - Appreciate ID consult   - Maintain NPO for now  - ERCP done 7/29 with sphincterotomy, drainage of pus, and placement of plastic cbd stent   - Continue PPI drip for now  - Will continue to follow  - IF without further evidence of bleeding consider restarting an AP agent given PCI Hx  - For any emergencies or change in status please contact [phone] or GI fellow on call

## 2021-07-30 NOTE — PROGRESS NOTE ADULT - SUBJECTIVE AND OBJECTIVE BOX
VICKY SPENCER 61y Male  MRN#: [id number]   CODE STATUS: FULL    Hospital Day: 1d    Pt is currently admitted with the primary diagnosis of     SUBJECTIVE  Hospital Course  HPI: Patient is a 60 y/o M w/ PMH of HTN, CAD s/p PCI (19, Dr. Mcclure) on aspirin and brilinta (ticagrelor), cholecystitis s/p cholecystectomy by Dr Avalos in  presents with fever, chills and severe epigastric abdominal pain x 1 day. Per patient, he developed severe epigastric abdominal pain yesterday morning (2021) and went to [facility] for evaluation.  He was told he has gas around his pancreas and was discharged home in the afternoon. Then later in the evening around 9pm pt developed shaking chills with low fever with persistent epigastric pain, associated with nausea and non bilious vomiting x 2 so he came in to the hospital for further evaluation.     ED: Patient was found to be febrile with Tmax of 106, hypotensive with BP of 80/50s, and tachycardiac to the 150s. Patient was started on levophed for BP support. He received 3L of fluid bolus in the ED. His CT abdomen and pelvis was significant for mild inflammatory changes about the nondilated CBD which may reflect cholangitis. T bili 4.7.  GI was consulted and planned for ERCP with stent placement.      Overnight events       Subjective complaints     Present Today:   - Phillips:  No [ X ], Yes [   ] : Indication:     - Type of IV Access:       .. CVC/Piccline:  No [  ], Yes [ X  ] : Indication: placed       .. Midline: No [ X ], Yes [   ] : Indication:                                             ----------------------------------------------------------  OBJECTIVE  PAST MEDICAL & SURGICAL HISTORY  CAD in native artery  stents 2019    HTN (hypertension)    No significant past surgical history                                              -----------------------------------------------------------  ALLERGIES:  No Known Allergies                                            ------------------------------------------------------------    HOME MEDICATIONS  Home Medications:  acetaminophen 325 mg oral tablet: 2 tab(s) orally every 6 hours (2020 08:33)                           MEDICATIONS:  STANDING MEDICATIONS  atorvastatin 80 milliGRAM(s) Oral at bedtime  cefTRIAXone   IVPB 2000 milliGRAM(s) IV Intermittent every 24 hours  chlorhexidine 4% Liquid 1 Application(s) Topical daily  lactated ringers. 1000 milliLiter(s) IV Continuous <Continuous>  metroNIDAZOLE  IVPB      norepinephrine Infusion 0.05 MICROgram(s)/kG/Min IV Continuous <Continuous>  pantoprazole Infusion 8 mG/Hr IV Continuous <Continuous>  senna 2 Tablet(s) Oral at bedtime    PRN MEDICATIONS  acetaminophen    Suspension .. 650 milliGRAM(s) Oral every 6 hours PRN  morphine  - Injectable 2 milliGRAM(s) IV Push every 4 hours PRN  ondansetron Injectable 4 milliGRAM(s) IV Push every 6 hours PRN                                            ------------------------------------------------------------  VITAL SIGNS: Last 24 Hours  T(C): 37.1 (2021 12:00), Max: 38.6 (2021 02:00)  T(F): 98.8 (2021 12:00), Max: 101.5 (2021 02:00)  HR: 88 (2021 12:00) (74 - 116)  BP: 134/72 (2021 12:00) (83/51 - 147/56)  BP(mean): 90 (2021 12:00) (58 - 103)  RR: 21 (2021 12:00) (11 - 27)  SpO2: 98% (2021 12:00) (82% - 100%)      21 @ 07:01  -  21 @ 07:00  --------------------------------------------------------  IN: 4090.7 mL / OUT: 2300 mL / NET: 1790.7 mL    21 @ 07:  -  21 @ 12:27  --------------------------------------------------------  IN: 629.2 mL / OUT: 350 mL / NET: 279.2 mL                                             --------------------------------------------------------------  LABS:                        12.2   7.02  )-----------( 108      ( 2021 11:00 )             37.4     07    139  |  107  |  13  ----------------------------<  116<H>  4.0   |  23  |  1.0    Ca    7.9<L>      2021 05:00  Mg     1.8         TPro  4.8<L>  /  Alb  3.0<L>  /  TBili  5.5<H>  /  DBili  x   /  AST  109<H>  /  ALT  240<H>  /  AlkPhos  179<H>      PT/INR - ( 2021 20:49 )   PT: 14.70 sec;   INR: 1.28 ratio         PTT - ( 2021 20:49 )  PTT:27.5 sec  Urinalysis Basic - ( 2021 00:00 )    Color: Yellow / Appearance: Slightly Turbid / S.016 / pH: x  Gluc: x / Ketone: Trace  / Bili: Negative / Urobili: <2 mg/dL   Blood: x / Protein: Negative / Nitrite: Negative   Leuk Esterase: Negative / RBC: 2 /HPF / WBC 0 /HPF   Sq Epi: x / Non Sq Epi: 0 /HPF / Bacteria: Negative              Culture - Urine (collected 2021 00:00)  Source: Clean Catch Clean Catch (Midstream)  Final Report (2021 10:33):    No growth    Culture - Blood (collected 2021 20:49)  Source: .Blood Blood-Peripheral  Gram Stain (2021 13:23):    Growth in aerobic and anaerobic bottles: Gram Negative Rods  Preliminary Report (2021 13:23):    Growth in aerobic and anaerobic bottles: Gram Negative Rods    Culture - Blood (collected 2021 20:49)  Source: .Blood Blood-Peripheral  Gram Stain (2021 14:58):    Growth in anaerobic bottle: Gram Negative Rods    Growth in aerobic bottle: Gram Negative Rods  Preliminary Report (2021 11:56):    Growth in aerobic and anaerobic bottles: Klebsiella pneumoniae    ***Blood Panel PCR results on this specimen are available    approximately 3 hours after the Gram stain result.***    Gram stain, PCR, and/or culture results may not always    correspond dueto difference in methodologies.    ************************************************************    This PCR assay was performed by multiplex PCR. This    Assay tests for 66 bacterial and resistance gene targets.    Please refer to the [facility] test directory    at [email or web address] for details.  Organism: Blood Culture PCR (2021 15:25)  Organism: Blood Culture PCR (2021 15:25)                                                    -------------------------------------------------------------  RADIOLOGY:  EXAM:  XR KUB 1 VIEW, 2021    Support devices: CBD stent in radiographically appropriate position. Post cholecystectomy.  Impression:  Nonobstructive bowel gas pattern.      EXAM:  XR CHEST PORTABLE URGENT 1V,  2021    Support devices: Stable right IJ central venous catheter.  Impression:  Unchanged bibasilar atelectasis.      EXAM:  US ABDOMEN RT UPR QUADRANT, 2021    IMPRESSION:  Postcholecystectomy without evidence of CBD dilatation.      EXAM:  CT ABDOMEN AND PELVIS IC        EXAM:  CT CHEST IC        PROCEDURE DATE:  2021    IMPRESSION:  Mild inflammatory changes about the nondilated CBD may reflect evidence of cholangitis.  Otherwise, no evidence of acute thoracic, abdominal or pelvic pathology.  Stable pulmonary nodules.                                            --------------------------------------------------------------    PHYSICAL EXAM:  General:   HEENT:  LUNGS:  HEART:  ABDOMEN:  EXT:  NEURO:  SKIN:           VICKY SPENCER 61y Male  MRN#: [id number]   CODE STATUS: FULL    Hospital Day: 1d    Pt is currently admitted with the primary diagnosis of septic shock 2/2 cholangitis.    SUBJECTIVE  Hospital Course  HPI: Patient is a 62 y/o M w/ PMH of HTN, CAD s/p PCI (19, Dr. Mcclure) on aspirin and brilinta (ticagrelor), cholecystitis s/p cholecystectomy by Dr Avalos in  presents with fever, chills and severe epigastric abdominal pain x 1 day. Per patient, he developed severe epigastric abdominal pain yesterday morning (2021) and went to our [facility] for evaluation.  He was told he has gas around his pancreas and was discharged home in the afternoon. Then later in the evening around 9pm pt developed shaking chills with low fever with persistent epigastric pain, associated with nausea and non bilious vomiting x 2 so he came in to the hospital for further evaluation.     ED: Patient was found to be febrile with Tmax of 106, hypotensive with BP of 80/50s, and tachycardiac to the 150s. Patient was started on levophed for BP support. He received 3L of fluid bolus in the ED. His CT abdomen and pelvis was significant for mild inflammatory changes about the nondilated CBD which may reflect cholangitis. T bili 4.7.  GI was consulted and planned for ERCP with stent placement.      Overnight events   ERCP with stent placement yesterday.  Episode of hematemesis this AM with visible blood clots per RN.  Hgb dropped from 15.9 at admission to 12.1 - will repeat CBC to r/out bleed.    Subjective complaints   Patient seen and examined at bedside.  Endorses nausea, lethargy, and weakness this AM.  Able to move all extremities and denies any abdominal pain, CP, SOB, or vision changes.    Present Today:   - Phillips:  No [ X ], Yes [   ] : Indication:     - Type of IV Access:       .. CVC/Piccline:  No [  ], Yes [ X  ] : Indication: placed       .. Midline: No [ X ], Yes [   ] : Indication:                                             ----------------------------------------------------------  OBJECTIVE  PAST MEDICAL & SURGICAL HISTORY  CAD in native artery  stents 2019    HTN (hypertension)    No significant past surgical history                                              -----------------------------------------------------------  ALLERGIES:  No Known Allergies                                            ------------------------------------------------------------    HOME MEDICATIONS  Home Medications:  acetaminophen 325 mg oral tablet: 2 tab(s) orally every 6 hours (2020 08:33)                           MEDICATIONS:  STANDING MEDICATIONS  atorvastatin 80 milliGRAM(s) Oral at bedtime  cefTRIAXone   IVPB 2000 milliGRAM(s) IV Intermittent every 24 hours  chlorhexidine 4% Liquid 1 Application(s) Topical daily  lactated ringers. 1000 milliLiter(s) IV Continuous <Continuous>  metroNIDAZOLE  IVPB      norepinephrine Infusion 0.05 MICROgram(s)/kG/Min IV Continuous <Continuous>  pantoprazole Infusion 8 mG/Hr IV Continuous <Continuous>  senna 2 Tablet(s) Oral at bedtime    PRN MEDICATIONS  acetaminophen    Suspension .. 650 milliGRAM(s) Oral every 6 hours PRN  morphine  - Injectable 2 milliGRAM(s) IV Push every 4 hours PRN  ondansetron Injectable 4 milliGRAM(s) IV Push every 6 hours PRN                                            ------------------------------------------------------------  VITAL SIGNS: Last 24 Hours  T(C): 37.1 (2021 12:00), Max: 38.6 (2021 02:00)  T(F): 98.8 (2021 12:00), Max: 101.5 (2021 02:00)  HR: 88 (2021 12:00) (74 - 116)  BP: 134/72 (2021 12:00) (83/51 - 147/56)  BP(mean): 90 (2021 12:00) (58 - 103)  RR: 21 (2021 12:00) (11 - 27)  SpO2: 98% (2021 12:00) (82% - 100%)      21 @ 07:01  -  21 @ 07:00  --------------------------------------------------------  IN: 4090.7 mL / OUT: 2300 mL / NET: 1790.7 mL    21 @ 07:  -  21 @ 12:27  --------------------------------------------------------  IN: 629.2 mL / OUT: 350 mL / NET: 279.2 mL                                             --------------------------------------------------------------  LABS:                        12.2   7.02  )-----------( 108      ( 2021 11:00 )             37.4     30    139  |  107  |  13  ----------------------------<  116<H>  4.0   |  23  |  1.0    Ca    7.9<L>      2021 05:00  Mg     1.8         TPro  4.8<L>  /  Alb  3.0<L>  /  TBili  5.5<H>  /  DBili  x   /  AST  109<H>  /  ALT  240<H>  /  AlkPhos  179<H>      PT/INR - ( 2021 20:49 )   PT: 14.70 sec;   INR: 1.28 ratio         PTT - ( 2021 20:49 )  PTT:27.5 sec  Urinalysis Basic - ( 2021 00:00 )    Color: Yellow / Appearance: Slightly Turbid / S.016 / pH: x  Gluc: x / Ketone: Trace  / Bili: Negative / Urobili: <2 mg/dL   Blood: x / Protein: Negative / Nitrite: Negative   Leuk Esterase: Negative / RBC: 2 /HPF / WBC 0 /HPF   Sq Epi: x / Non Sq Epi: 0 /HPF / Bacteria: Negative              Culture - Urine (collected 2021 00:00)  Source: Clean Catch Clean Catch (Midstream)  Final Report (2021 10:33):    No growth    Culture - Blood (collected 2021 20:49)  Source: .Blood Blood-Peripheral  Gram Stain (2021 13:23):    Growth in aerobic and anaerobic bottles: Gram Negative Rods  Preliminary Report (2021 13:23):    Growth in aerobic and anaerobic bottles: Gram Negative Rods    Culture - Blood (collected 2021 20:49)  Source: .Blood Blood-Peripheral  Gram Stain (2021 14:58):    Growth in anaerobic bottle: Gram Negative Rods    Growth in aerobic bottle: Gram Negative Rods  Preliminary Report (2021 11:56):    Growth in aerobic and anaerobic bottles: Klebsiella pneumoniae    ***Blood Panel PCR results on this specimen are available    approximately 3 hours after the Gram stain result.***    Gram stain, PCR, and/or culture results may not always    correspond dueto difference in methodologies.    ************************************************************    This PCR assay was performed by multiplex PCR. This    Assay tests for 66 bacterial and resistance gene targets.    Please refer to the [facility] test directory    at [email or web address] for details.  Organism: Blood Culture PCR (2021 15:25)  Organism: Blood Culture PCR (2021 15:25)                                                    -------------------------------------------------------------  RADIOLOGY:  EXAM:  XR KUB 1 VIEW, 2021    Support devices: CBD stent in radiographically appropriate position. Post cholecystectomy.  Impression:  Nonobstructive bowel gas pattern.      EXAM:  XR CHEST PORTABLE URGENT 1V,  2021    Support devices: Stable right IJ central venous catheter.  Impression:  Unchanged bibasilar atelectasis.      ERCP Report Date: 2021 12:15 PM   Impressions: Cholangitis s/p small biliary sphincterotomy. Sludge, pus and pigmented debris extracted from the CBD which measured 4 mm and had a small distal filling defect  s/p 8.5 Fr x 7 cm plastic biliary stent placement.       EXAM:  US ABDOMEN RT UPR QUADRANT, 2021    IMPRESSION:  Postcholecystectomy without evidence of CBD dilatation.      EXAM:  CT ABDOMEN AND PELVIS IC        EXAM:  CT CHEST IC        PROCEDURE DATE:  2021    IMPRESSION:  Mild inflammatory changes about the nondilated CBD may reflect evidence of cholangitis.  Otherwise, no evidence of acute thoracic, abdominal or pelvic pathology.  Stable pulmonary nodules.                                            --------------------------------------------------------------    PHYSICAL EXAM:  General: lying in bed, mild distress  HEENT: mild icterus, supple, nontraumatic  LUNGS: CTA, no wheezing, rales, or rhonchi, on 2L NC  HEART: RRR, S1/S2 detected  ABDOMEN: soft, nontender, +BS  EXT: moves all extremities  NEURO: CN grossly intact, no focal deficits  SKIN: warm, dry, jaundice

## 2021-07-31 LAB
-  AMIKACIN: SIGNIFICANT CHANGE UP
-  AMPICILLIN/SULBACTAM: SIGNIFICANT CHANGE UP
-  AMPICILLIN: SIGNIFICANT CHANGE UP
-  AZTREONAM: SIGNIFICANT CHANGE UP
-  CEFAZOLIN: SIGNIFICANT CHANGE UP
-  CEFEPIME: SIGNIFICANT CHANGE UP
-  CEFOXITIN: SIGNIFICANT CHANGE UP
-  CEFTRIAXONE: SIGNIFICANT CHANGE UP
-  CIPROFLOXACIN: SIGNIFICANT CHANGE UP
-  ERTAPENEM: SIGNIFICANT CHANGE UP
-  GENTAMICIN: SIGNIFICANT CHANGE UP
-  IMIPENEM: SIGNIFICANT CHANGE UP
-  LEVOFLOXACIN: SIGNIFICANT CHANGE UP
-  MEROPENEM: SIGNIFICANT CHANGE UP
-  PIPERACILLIN/TAZOBACTAM: SIGNIFICANT CHANGE UP
-  TOBRAMYCIN: SIGNIFICANT CHANGE UP
-  TRIMETHOPRIM/SULFAMETHOXAZOLE: SIGNIFICANT CHANGE UP
ALBUMIN SERPL ELPH-MCNC: 2.9 G/DL — LOW (ref 3.5–5.2)
ALP SERPL-CCNC: 169 U/L — HIGH (ref 30–115)
ALT FLD-CCNC: 160 U/L — HIGH (ref 0–41)
ANION GAP SERPL CALC-SCNC: 10 MMOL/L — SIGNIFICANT CHANGE UP (ref 7–14)
AST SERPL-CCNC: 57 U/L — HIGH (ref 0–41)
BASOPHILS # BLD AUTO: 0.01 K/UL — SIGNIFICANT CHANGE UP (ref 0–0.2)
BASOPHILS NFR BLD AUTO: 0.1 % — SIGNIFICANT CHANGE UP (ref 0–1)
BILIRUB SERPL-MCNC: 2.3 MG/DL — HIGH (ref 0.2–1.2)
BUN SERPL-MCNC: 13 MG/DL — SIGNIFICANT CHANGE UP (ref 10–20)
CALCIUM SERPL-MCNC: 8.1 MG/DL — LOW (ref 8.5–10.1)
CHLORIDE SERPL-SCNC: 104 MMOL/L — SIGNIFICANT CHANGE UP (ref 98–110)
CO2 SERPL-SCNC: 25 MMOL/L — SIGNIFICANT CHANGE UP (ref 17–32)
CREAT SERPL-MCNC: 0.8 MG/DL — SIGNIFICANT CHANGE UP (ref 0.7–1.5)
CULTURE RESULTS: SIGNIFICANT CHANGE UP
CULTURE RESULTS: SIGNIFICANT CHANGE UP
EOSINOPHIL # BLD AUTO: 0.03 K/UL — SIGNIFICANT CHANGE UP (ref 0–0.7)
EOSINOPHIL NFR BLD AUTO: 0.4 % — SIGNIFICANT CHANGE UP (ref 0–8)
GLUCOSE SERPL-MCNC: 102 MG/DL — HIGH (ref 70–99)
HCT VFR BLD CALC: 35.8 % — LOW (ref 42–52)
HGB BLD-MCNC: 11.8 G/DL — LOW (ref 14–18)
IMM GRANULOCYTES NFR BLD AUTO: 1.2 % — HIGH (ref 0.1–0.3)
LYMPHOCYTES # BLD AUTO: 0.64 K/UL — LOW (ref 1.2–3.4)
LYMPHOCYTES # BLD AUTO: 8.8 % — LOW (ref 20.5–51.1)
MAGNESIUM SERPL-MCNC: 1.9 MG/DL — SIGNIFICANT CHANGE UP (ref 1.8–2.4)
MCHC RBC-ENTMCNC: 29.5 PG — SIGNIFICANT CHANGE UP (ref 27–31)
MCHC RBC-ENTMCNC: 33 G/DL — SIGNIFICANT CHANGE UP (ref 32–37)
MCV RBC AUTO: 89.5 FL — SIGNIFICANT CHANGE UP (ref 80–94)
METHOD TYPE: SIGNIFICANT CHANGE UP
MONOCYTES # BLD AUTO: 0.49 K/UL — SIGNIFICANT CHANGE UP (ref 0.1–0.6)
MONOCYTES NFR BLD AUTO: 6.7 % — SIGNIFICANT CHANGE UP (ref 1.7–9.3)
NEUTROPHILS # BLD AUTO: 6.05 K/UL — SIGNIFICANT CHANGE UP (ref 1.4–6.5)
NEUTROPHILS NFR BLD AUTO: 82.8 % — HIGH (ref 42.2–75.2)
NRBC # BLD: 0 /100 WBCS — SIGNIFICANT CHANGE UP (ref 0–0)
ORGANISM # SPEC MICROSCOPIC CNT: SIGNIFICANT CHANGE UP
PLATELET # BLD AUTO: 99 K/UL — LOW (ref 130–400)
POTASSIUM SERPL-MCNC: 4 MMOL/L — SIGNIFICANT CHANGE UP (ref 3.5–5)
POTASSIUM SERPL-SCNC: 4 MMOL/L — SIGNIFICANT CHANGE UP (ref 3.5–5)
PROT SERPL-MCNC: 4.9 G/DL — LOW (ref 6–8)
RBC # BLD: 4 M/UL — LOW (ref 4.7–6.1)
RBC # FLD: 13.2 % — SIGNIFICANT CHANGE UP (ref 11.5–14.5)
SODIUM SERPL-SCNC: 139 MMOL/L — SIGNIFICANT CHANGE UP (ref 135–146)
SPECIMEN SOURCE: SIGNIFICANT CHANGE UP
SPECIMEN SOURCE: SIGNIFICANT CHANGE UP
WBC # BLD: 7.31 K/UL — SIGNIFICANT CHANGE UP (ref 4.8–10.8)
WBC # FLD AUTO: 7.31 K/UL — SIGNIFICANT CHANGE UP (ref 4.8–10.8)

## 2021-07-31 PROCEDURE — 99232 SBSQ HOSP IP/OBS MODERATE 35: CPT

## 2021-07-31 RX ORDER — ACETAMINOPHEN 500 MG
650 TABLET ORAL EVERY 6 HOURS
Refills: 0 | Status: DISCONTINUED | OUTPATIENT
Start: 2021-07-31 | End: 2021-07-31

## 2021-07-31 RX ORDER — IBUPROFEN 200 MG
400 TABLET ORAL EVERY 6 HOURS
Refills: 0 | Status: DISCONTINUED | OUTPATIENT
Start: 2021-07-31 | End: 2021-08-03

## 2021-07-31 RX ORDER — ASPIRIN/CALCIUM CARB/MAGNESIUM 324 MG
81 TABLET ORAL DAILY
Refills: 0 | Status: DISCONTINUED | OUTPATIENT
Start: 2021-07-31 | End: 2021-08-03

## 2021-07-31 RX ORDER — ENOXAPARIN SODIUM 100 MG/ML
40 INJECTION SUBCUTANEOUS AT BEDTIME
Refills: 0 | Status: DISCONTINUED | OUTPATIENT
Start: 2021-07-31 | End: 2021-08-03

## 2021-07-31 RX ADMIN — ENOXAPARIN SODIUM 40 MILLIGRAM(S): 100 INJECTION SUBCUTANEOUS at 21:05

## 2021-07-31 RX ADMIN — Medication 400 MILLIGRAM(S): at 23:40

## 2021-07-31 RX ADMIN — Medication 100 MILLIGRAM(S): at 21:04

## 2021-07-31 RX ADMIN — Medication 100 MILLIGRAM(S): at 05:55

## 2021-07-31 RX ADMIN — CHLORHEXIDINE GLUCONATE 1 APPLICATION(S): 213 SOLUTION TOPICAL at 21:05

## 2021-07-31 RX ADMIN — PANTOPRAZOLE SODIUM 10 MG/HR: 20 TABLET, DELAYED RELEASE ORAL at 05:55

## 2021-07-31 RX ADMIN — Medication 100 MILLIGRAM(S): at 13:39

## 2021-07-31 RX ADMIN — ATORVASTATIN CALCIUM 80 MILLIGRAM(S): 80 TABLET, FILM COATED ORAL at 21:05

## 2021-07-31 RX ADMIN — CEFTRIAXONE 100 MILLIGRAM(S): 500 INJECTION, POWDER, FOR SOLUTION INTRAMUSCULAR; INTRAVENOUS at 14:31

## 2021-07-31 RX ADMIN — ONDANSETRON 4 MILLIGRAM(S): 8 TABLET, FILM COATED ORAL at 21:58

## 2021-07-31 RX ADMIN — Medication 81 MILLIGRAM(S): at 13:35

## 2021-07-31 RX ADMIN — ONDANSETRON 4 MILLIGRAM(S): 8 TABLET, FILM COATED ORAL at 14:30

## 2021-07-31 NOTE — PROGRESS NOTE ADULT - ASSESSMENT
patient is a 62 y/o M w/ PMH of HTN, CAD s/p PCI (9/25/19, Dr. Mcclure) on aspirin and brilinta, cholecystitis s/p cholecystectomy by Dr Avalos in 2020 presents with fever, chills and severe epigastric abdominal pain x 1 day.     IMPRESSION:  Sepsis present on admission imrpoving  Septic Shock/ klebsiella bacteremia  acute cholangitis sp ERCP  HO CAD s/p PCI in 2019  HO Cholecystectomy  Pulmonary nodules  UGI stable hb    PLAN:    CNS: Avoid CNS depressants. Pain control    HEENT: Oral care    PULMONARY:  HOB @ 45 degrees.  Keep O2 > 94%. aspiration precautions Pulmonary toilet.     CARDIOVASCULAR: Fluid resuscitation with LR at 75cc/hr. cardio f/up    GI: GI prophylaxis.  protonix, clear liquid    RENAL:  Follow up lytes.  Correct as needed    INFECTIOUS DISEASE: per ID    HEMATOLOGICAL:  DVT prophylaxis with SCDs for now.    ENDOCRINE:  Follow up FS.  Insulin protocol if needed.    MUSCULOSKELETAL: bed rest    Lines: Right IJ CVC 7/29  Dispo: MICU  full code

## 2021-07-31 NOTE — PROGRESS NOTE ADULT - SUBJECTIVE AND OBJECTIVE BOX
OVERNIGHT EVENTS: events noted, on LR 75 CC/H, protonix drip, intermittent epigastric spasm    Vital Signs Last 24 Hrs  T(C): 36.5 (31 Jul 2021 04:07), Max: 38 (30 Jul 2021 16:00)  T(F): 97.7 (31 Jul 2021 04:07), Max: 100.4 (30 Jul 2021 16:00)  HR: 78 (31 Jul 2021 07:07) (78 - 98)  BP: 143/69 (31 Jul 2021 07:07) (115/60 - 154/71)  BP(mean): 97 (31 Jul 2021 07:07) (77 - 105)  RR: 16 (31 Jul 2021 07:07) (12 - 27)  SpO2: 97% (31 Jul 2021 07:07) (93% - 98%)    PHYSICAL EXAMINATION:    GENERAL: ill looking    HEENT: Head is normocephalic and atraumatic. icteric sclera    NECK: Supple.    LUNGS: dec bs both abses    HEART: Regular rate and rhythm without murmur.    ABDOMEN: Soft, mild tenderness    EXTREMITIES: Without any cyanosis, clubbing, rash, lesions or edema.    NEUROLOGIC: Grossly intact.    SKIN: No ulceration or induration present.      LABS:                        11.8   7.31  )-----------( 99       ( 31 Jul 2021 04:44 )             35.8     07-31    139  |  104  |  13  ----------------------------<  102<H>  4.0   |  25  |  0.8    Ca    8.1<L>      31 Jul 2021 04:44  Mg     1.9     07-31    TPro  4.9<L>  /  Alb  2.9<L>  /  TBili  2.3<H>  /  DBili  x   /  AST  57<H>  /  ALT  160<H>  /  AlkPhos  169<H>  07-31    PT/INR - ( 30 Jul 2021 16:00 )   PT: 17.00 sec;   INR: 1.48 ratio         PTT - ( 30 Jul 2021 16:00 )  PTT:33.6 sec                      07-30-21 @ 07:01  -  07-31-21 @ 07:00  --------------------------------------------------------  IN: 2359.2 mL / OUT: 1150 mL / NET: 1209.2 mL        MICROBIOLOGY:  Culture Results:   No growth (07-29 @ 00:00)  Culture Results:   Growth in aerobic and anaerobic bottles: Klebsiella pneumoniae  ***Blood Panel PCR results on this specimen are available  approximately 3 hours after the Gram stain result.***  Gram stain, PCR, and/or culture results may not always  correspond dueto difference in methodologies.  ************************************************************  This PCR assay was performed by multiplex PCR. This  Assay tests for 66 bacterial and resistance gene targets.  Please refer to the [facility] test directory  at [email or web address] for details. (07-28 @ 20:49)  Culture Results:   Growth in aerobic and anaerobic bottles: Klebsiella pneumoniae  See previous culture [id number] (07-28 @ 20:49)      MEDICATIONS  (STANDING):  atorvastatin 80 milliGRAM(s) Oral at bedtime  cefTRIAXone   IVPB 2000 milliGRAM(s) IV Intermittent every 24 hours  chlorhexidine 4% Liquid 1 Application(s) Topical daily  lactated ringers. 1000 milliLiter(s) (75 mL/Hr) IV Continuous <Continuous>  metroNIDAZOLE  IVPB      metroNIDAZOLE  IVPB 500 milliGRAM(s) IV Intermittent every 8 hours  norepinephrine Infusion 0.05 MICROgram(s)/kG/Min (9.36 mL/Hr) IV Continuous <Continuous>  pantoprazole Infusion 8 mG/Hr (10 mL/Hr) IV Continuous <Continuous>  senna 2 Tablet(s) Oral at bedtime    MEDICATIONS  (PRN):  morphine  - Injectable 2 milliGRAM(s) IV Push every 4 hours PRN Moderate Pain (4 - 6)  ondansetron Injectable 4 milliGRAM(s) IV Push every 6 hours PRN Nausea and/or Vomiting      RADIOLOGY & ADDITIONAL STUDIES:

## 2021-07-31 NOTE — PROGRESS NOTE ADULT - SUBJECTIVE AND OBJECTIVE BOX
Patient is a 62 y/o with PMHx of HTN, CAD s/p PCI (9/25/19, Dr. Mcclure) on aspirin and Brilinta cholecystitis s/p cholecystectomy by Dr Avalos in 2020 who presented to [facility] with abdominal pain. Patient met SIRS criteria on presentation and high clinical suspicion remained for cholangitis. Patient s/p ERCP with sphincterotomy, drainage of pus from CBD, and placement of a Biliary stent. Patient remains admitted in the ICU. Patient has new ABX regimen as advised by ID. Patient also on Thursday had isolated episode of hematemesis which has resolved and with one stool since ( Semi formed brown). He notes episode of pain while taking tylenol which has since resolved. No current appetite.     PAST MEDICAL & SURGICAL HISTORY:  CAD requring PCI with stents   HTN (hypertension)  CCY      MEDICATIONS  (STANDING):  atorvastatin 80 milliGRAM(s) Oral at bedtime  cefepime   IVPB 2000 milliGRAM(s) IV Intermittent every 8 hours  lactated ringers Bolus 1000 milliLiter(s) IV Bolus once  lactated ringers. 1000 milliLiter(s) (150 mL/Hr) IV Continuous <Continuous>  metroNIDAZOLE  IVPB      metroNIDAZOLE  IVPB 500 milliGRAM(s) IV Intermittent once  metroNIDAZOLE  IVPB 500 milliGRAM(s) IV Intermittent every 8 hours  norepinephrine Infusion 0.05 MICROgram(s)/kG/Min (9.36 mL/Hr) IV Continuous <Continuous>  senna 2 Tablet(s) Oral at bedtime    MEDICATIONS  (PRN):  morphine  - Injectable 2 milliGRAM(s) IV Push every 4 hours PRN Moderate Pain (4 - 6)  ondansetron Injectable 4 milliGRAM(s) IV Push every 6 hours PRN Nausea and/or Vomiting      Allergies  No Known Allergies      Review of Systems  General:  See HPI  HEENT: Denies Trouble Swallowing ,Denies  Sore Throat , Denies Change in hearing/vision/speech ,Denies Dizziness    Cardio: Denies  Chest Pain , Palpitations    Respiratory: Denies worsening of SOB, Denies Cough  Abdomen: See detailed HPI  Neuro: Denies Headache Denies Dizziness, Denies Paresthesias  MSK: Denies pain in Bones/Joints/Muscles   Psych: Patient denies depression, denies suicidal or homicidal ideations  Integ: Patient Denies rash, or new skin lesions       Vital Signs   T(F): 97.7 (31 Jul 2021 04:07), Max: 100.4 (30 Jul 2021 16:00)  HR: 78 (31 Jul 2021 07:07) (78 - 98)  BP: 143/69 (31 Jul 2021 07:07) (113/62 - 154/71)  BP(mean): 97 (31 Jul 2021 07:07) (77 - 105)  RR: 16 (31 Jul 2021 07:07) (12 - 27)  SpO2: 97% (31 Jul 2021 07:07) (93% - 98%)  Physical Exam  Gen: NAD, diaphoretic   HEENT: NC/AT, Mucosal Membranes Dry  Cardio: S1/S2 No S3/S4, Tachy regular  Resp: CTA B/L  Abdomen: Soft, ND/NT  Neuro: AAOx3  Extremities: FROM x 4    LABS:                        15.9   4.69  )-----------( 165      ( 28 Jul 2021 20:49 )             47.9     07-28    136  |  101  |  18  ----------------------------<  124<H>  4.2   |  22  |  1.1    Ca    9.4      28 Jul 2021 20:49    TPro  7.2  /  Alb  4.5  /  TBili  4.7<H>  /  DBili  x   /  AST  493<H>  /  ALT  470<H>  /  AlkPhos  307<H>  07-28        Lipase:24         Blood Gas Venous - Lactate: 2.1 mmoL/L (07-28-21 @ 20:42)      Coags:     14.70  ----< 1.28    ( 28 Jul 2021 20:49 )     27.5        RADIOLOGY & ADDITIONAL STUDIES:  US Abdomen Upper Quadrant Right 07.29.21   IMPRESSION:    Postcholecystectomy without evidence of CBD dilatation.  CBD 5mm    CT of Abdomen   IMPRESSION:  Mild inflammatory changes about the nondilated CBD may reflect evidence of cholangitis.    Otherwise, no evidence of acute thoracic, abdominal or pelvic pathology.    Stable pulmonary nodules.

## 2021-08-01 LAB
ALBUMIN SERPL ELPH-MCNC: 3 G/DL — LOW (ref 3.5–5.2)
ALP SERPL-CCNC: 163 U/L — HIGH (ref 30–115)
ALT FLD-CCNC: 99 U/L — HIGH (ref 0–41)
ANION GAP SERPL CALC-SCNC: 6 MMOL/L — LOW (ref 7–14)
AST SERPL-CCNC: 36 U/L — SIGNIFICANT CHANGE UP (ref 0–41)
BASOPHILS # BLD AUTO: 0.01 K/UL — SIGNIFICANT CHANGE UP (ref 0–0.2)
BASOPHILS NFR BLD AUTO: 0.2 % — SIGNIFICANT CHANGE UP (ref 0–1)
BILIRUB DIRECT SERPL-MCNC: 0.4 MG/DL — HIGH (ref 0–0.2)
BILIRUB INDIRECT FLD-MCNC: 0.8 MG/DL — SIGNIFICANT CHANGE UP (ref 0.2–1.2)
BILIRUB SERPL-MCNC: 1.2 MG/DL — SIGNIFICANT CHANGE UP (ref 0.2–1.2)
BILIRUB SERPL-MCNC: 1.2 MG/DL — SIGNIFICANT CHANGE UP (ref 0.2–1.2)
BUN SERPL-MCNC: 10 MG/DL — SIGNIFICANT CHANGE UP (ref 10–20)
CALCIUM SERPL-MCNC: 8.1 MG/DL — LOW (ref 8.5–10.1)
CHLORIDE SERPL-SCNC: 105 MMOL/L — SIGNIFICANT CHANGE UP (ref 98–110)
CO2 SERPL-SCNC: 28 MMOL/L — SIGNIFICANT CHANGE UP (ref 17–32)
CREAT SERPL-MCNC: 0.6 MG/DL — LOW (ref 0.7–1.5)
EOSINOPHIL # BLD AUTO: 0.13 K/UL — SIGNIFICANT CHANGE UP (ref 0–0.7)
EOSINOPHIL NFR BLD AUTO: 2.8 % — SIGNIFICANT CHANGE UP (ref 0–8)
GLUCOSE SERPL-MCNC: 112 MG/DL — HIGH (ref 70–99)
HCT VFR BLD CALC: 35.4 % — LOW (ref 42–52)
HGB BLD-MCNC: 11.7 G/DL — LOW (ref 14–18)
IMM GRANULOCYTES NFR BLD AUTO: 0.4 % — HIGH (ref 0.1–0.3)
LYMPHOCYTES # BLD AUTO: 0.95 K/UL — LOW (ref 1.2–3.4)
LYMPHOCYTES # BLD AUTO: 20.7 % — SIGNIFICANT CHANGE UP (ref 20.5–51.1)
MAGNESIUM SERPL-MCNC: 2.1 MG/DL — SIGNIFICANT CHANGE UP (ref 1.8–2.4)
MCHC RBC-ENTMCNC: 28.6 PG — SIGNIFICANT CHANGE UP (ref 27–31)
MCHC RBC-ENTMCNC: 33.1 G/DL — SIGNIFICANT CHANGE UP (ref 32–37)
MCV RBC AUTO: 86.6 FL — SIGNIFICANT CHANGE UP (ref 80–94)
MONOCYTES # BLD AUTO: 0.48 K/UL — SIGNIFICANT CHANGE UP (ref 0.1–0.6)
MONOCYTES NFR BLD AUTO: 10.5 % — HIGH (ref 1.7–9.3)
NEUTROPHILS # BLD AUTO: 2.99 K/UL — SIGNIFICANT CHANGE UP (ref 1.4–6.5)
NEUTROPHILS NFR BLD AUTO: 65.4 % — SIGNIFICANT CHANGE UP (ref 42.2–75.2)
NRBC # BLD: 0 /100 WBCS — SIGNIFICANT CHANGE UP (ref 0–0)
PLATELET # BLD AUTO: 119 K/UL — LOW (ref 130–400)
POTASSIUM SERPL-MCNC: 3.9 MMOL/L — SIGNIFICANT CHANGE UP (ref 3.5–5)
POTASSIUM SERPL-SCNC: 3.9 MMOL/L — SIGNIFICANT CHANGE UP (ref 3.5–5)
PROT SERPL-MCNC: 5 G/DL — LOW (ref 6–8)
RBC # BLD: 4.09 M/UL — LOW (ref 4.7–6.1)
RBC # FLD: 13.2 % — SIGNIFICANT CHANGE UP (ref 11.5–14.5)
SODIUM SERPL-SCNC: 139 MMOL/L — SIGNIFICANT CHANGE UP (ref 135–146)
WBC # BLD: 4.58 K/UL — LOW (ref 4.8–10.8)
WBC # FLD AUTO: 4.58 K/UL — LOW (ref 4.8–10.8)

## 2021-08-01 PROCEDURE — 93970 EXTREMITY STUDY: CPT | Mod: 26

## 2021-08-01 PROCEDURE — 99232 SBSQ HOSP IP/OBS MODERATE 35: CPT

## 2021-08-01 RX ADMIN — ENOXAPARIN SODIUM 40 MILLIGRAM(S): 100 INJECTION SUBCUTANEOUS at 22:46

## 2021-08-01 RX ADMIN — Medication 81 MILLIGRAM(S): at 12:52

## 2021-08-01 RX ADMIN — Medication 100 MILLIGRAM(S): at 14:19

## 2021-08-01 RX ADMIN — PANTOPRAZOLE SODIUM 10 MG/HR: 20 TABLET, DELAYED RELEASE ORAL at 01:26

## 2021-08-01 RX ADMIN — ONDANSETRON 4 MILLIGRAM(S): 8 TABLET, FILM COATED ORAL at 17:33

## 2021-08-01 RX ADMIN — CEFTRIAXONE 100 MILLIGRAM(S): 500 INJECTION, POWDER, FOR SOLUTION INTRAMUSCULAR; INTRAVENOUS at 16:12

## 2021-08-01 RX ADMIN — CHLORHEXIDINE GLUCONATE 1 APPLICATION(S): 213 SOLUTION TOPICAL at 22:46

## 2021-08-01 RX ADMIN — Medication 100 MILLIGRAM(S): at 22:21

## 2021-08-01 RX ADMIN — Medication 100 MILLIGRAM(S): at 05:06

## 2021-08-01 RX ADMIN — PANTOPRAZOLE SODIUM 10 MG/HR: 20 TABLET, DELAYED RELEASE ORAL at 20:00

## 2021-08-01 RX ADMIN — Medication 400 MILLIGRAM(S): at 00:10

## 2021-08-01 RX ADMIN — SENNA PLUS 2 TABLET(S): 8.6 TABLET ORAL at 22:46

## 2021-08-01 RX ADMIN — ATORVASTATIN CALCIUM 80 MILLIGRAM(S): 80 TABLET, FILM COATED ORAL at 22:46

## 2021-08-01 NOTE — PROGRESS NOTE ADULT - ASSESSMENT
patient is a 62 y/o M w/ PMH of HTN, CAD s/p PCI (9/25/19, Dr. Mcclure) on aspirin and brilinta, cholecystitis s/p cholecystectomy by Dr Avalos in 2020 presents with fever, chills and severe epigastric abdominal pain x 1 day.     IMPRESSION:  Sepsis present on admission impoving  Septic Shock/ klebsiella bacteremia resolved  acute cholangitis sp ERCP  HO CAD s/p PCI in 2019  HO Cholecystectomy  Pulmonary nodules  UGI stable hb    PLAN:    CNS: Avoid CNS depressants. Pain control    HEENT: Oral care    PULMONARY:  HOB @ 45 degrees.  Keep O2 > 94%. aspiration precautions Pulmonary toilet.     CARDIOVASCULAR: off IVF,  cardio f/up    GI: GI prophylaxis.  protonix, clear liquid advance diet    RENAL:  Follow up lytes.  Correct as needed, repeat CBC, CMP    INFECTIOUS DISEASE: per ID    HEMATOLOGICAL:  DVT prophylaxis with SCDs for now.    ENDOCRINE:  Follow up FS.  Insulin protocol if needed.    MUSCULOSKELETAL: bed rest    Lines: Right IJ CVC 7/29 dc  Dispo: floor  full code

## 2021-08-01 NOTE — PROGRESS NOTE ADULT - SUBJECTIVE AND OBJECTIVE BOX
VICKY SPENCER  61y, Male  Allergy: No Known Allergies      LOS  3d    CHIEF COMPLAINT: cholangitis (01 Aug 2021 09:06)      INTERVAL EVENTS/HPI  - No acute events overnight  - T(F): , Max: 98.3 (07-31-21 @ 20:00)  - Denies any worsening symptoms  - Tolerating medication  - WBC Count: 7.31 (07-31-21 @ 04:44)  WBC Count: 6.45 (07-30-21 @ 16:00)     - Creatinine, Serum: 0.8 (07-31-21 @ 04:44)       ROS  General: Denies rigors, nightsweats  HEENT: Denies headache, rhinorrhea, sore throat, eye pain  CV: Denies CP, palpitations  PULM: Denies wheezing, hemoptysis  GI: Denies hematemesis, hematochezia, melena  : Denies discharge, hematuria  MSK: Denies arthralgias, myalgias  SKIN: Denies rash, lesions  NEURO: Denies paresthesias, weakness  PSYCH: Denies depression, anxiety    VITALS:  T(F): 96.2, Max: 98.3 (07-31-21 @ 20:00)  HR: 68  BP: 135/66  RR: 14Vital Signs Last 24 Hrs  T(C): 35.7 (01 Aug 2021 08:00), Max: 36.8 (31 Jul 2021 20:00)  T(F): 96.2 (01 Aug 2021 08:00), Max: 98.3 (31 Jul 2021 20:00)  HR: 68 (01 Aug 2021 09:05) (62 - 94)  BP: 135/66 (01 Aug 2021 09:05) (115/71 - 153/79)  BP(mean): 95 (01 Aug 2021 09:05) (85 - 105)  RR: 14 (01 Aug 2021 09:05) (14 - 23)  SpO2: 98% (01 Aug 2021 09:05) (92% - 100%)    PHYSICAL EXAM:  Gen: NAD, resting in bed  HEENT: Normocephalic, atraumatic  Neck: supple, no lymphadenopathy  CV: Regular rate & regular rhythm  Lungs: decreased BS at bases, no fremitus  Abdomen: Soft, BS present  Ext: Warm, well perfused  Neuro: non focal, awake  Skin: no rash, no erythema  Lines: no phlebitis    FH: Non-contributory  Social Hx: Non-contributory    TESTS & MEASUREMENTS:                        11.8   7.31  )-----------( 99       ( 31 Jul 2021 04:44 )             35.8     07-31    139  |  104  |  13  ----------------------------<  102<H>  4.0   |  25  |  0.8    Ca    8.1<L>      31 Jul 2021 04:44  Mg     1.9     07-31    TPro  4.9<L>  /  Alb  2.9<L>  /  TBili  2.3<H>  /  DBili  x   /  AST  57<H>  /  ALT  160<H>  /  AlkPhos  169<H>  07-31      LIVER FUNCTIONS - ( 31 Jul 2021 04:44 )  Alb: 2.9 g/dL / Pro: 4.9 g/dL / ALK PHOS: 169 U/L / ALT: 160 U/L / AST: 57 U/L / GGT: x               Culture - Blood (collected 07-30-21 @ 05:18)  Source: .Blood None  Preliminary Report (07-31-21 @ 14:01):    No growth to date.    Culture - Urine (collected 07-29-21 @ 00:00)  Source: Clean Catch Clean Catch (Midstream)  Final Report (07-30-21 @ 10:33):    No growth    Culture - Blood (collected 07-28-21 @ 20:49)  Source: .Blood Blood-Peripheral  Gram Stain (07-29-21 @ 13:23):    Growth in aerobic and anaerobic bottles: Gram Negative Rods  Final Report (07-31-21 @ 08:27):    Growth in aerobic and anaerobic bottles: Klebsiella pneumoniae    See previous culture [id number]    Culture - Blood (collected 07-28-21 @ 20:49)  Source: .Blood Blood-Peripheral  Gram Stain (07-29-21 @ 14:58):    Growth in anaerobic bottle: Gram Negative Rods    Growth in aerobic bottle: Gram Negative Rods  Final Report (07-31-21 @ 08:27):    Growth in aerobic and anaerobic bottles: Klebsiella pneumoniae    ***Blood Panel PCR results on this specimen are available    approximately 3 hours after the Gram stain result.***    Gram stain, PCR, and/or culture results may not always    correspond dueto difference in methodologies.    ************************************************************    This PCR assay was performed by multiplex PCR. This    Assay tests for 66 bacterial and resistance gene targets.    Please refer to the [facility] test directory    at [email or web address] for details.  Organism: Blood Culture PCR  Klebsiella pneumoniae (07-31-21 @ 08:27)  Organism: Klebsiella pneumoniae (07-31-21 @ 08:27)      -  Amikacin: S <=16      -  Ampicillin: R >16 These ampicillin results predict results for amoxicillin      -  Ampicillin/Sulbactam: S <=4/2 Enterobacter, Citrobacter, and Serratia may develop resistance during prolonged therapy (3-4 days)      -  Aztreonam: S <=4      -  Cefazolin: S <=2 Enterobacter, Citrobacter, and Serratia may develop resistance during prolonged therapy (3-4 days)      -  Cefepime: S <=2      -  Cefoxitin: S <=8      -  Ceftriaxone: S <=1 Enterobacter, Citrobacter, and Serratia may develop resistance during prolonged therapy      -  Ciprofloxacin: S <=0.25      -  Ertapenem: S <=0.5      -  Gentamicin: S <=2      -  Imipenem: S <=1      -  Levofloxacin: S <=0.5      -  Meropenem: S <=1      -  Piperacillin/Tazobactam: S <=8      -  Tobramycin: S <=2      -  Trimethoprim/Sulfamethoxazole: S <=0.5/9.5      Method Type: BEN  Organism: Blood Culture PCR (07-31-21 @ 08:27)      -  Klebsiella pneumoniae: Detec      Method Type: PCR        Blood Gas Venous - Lactate: 2.1 mmoL/L (07-28-21 @ 20:42)      INFECTIOUS DISEASES TESTING  COVID-19 PCR: NotDetec (07-28-21 @ 21:15)      INFLAMMATORY MARKERS      RADIOLOGY & ADDITIONAL TESTS:  I have personally reviewed the last available Chest xray  CXR  Xray Chest 1 View- PORTABLE-Urgent:   EXAM:  XR CHEST PORTABLE URGENT 1V            PROCEDURE DATE:  07/30/2021            INTERPRETATION:  Clinical History / Reason for exam: Fever    Comparison : Chest radiograph 7/28/2021.    Technique/Positioning: Frontal radiograph the chest.    Findings:    Support devices: Stable right IJ central venous catheter.    Cardiac/mediastinum/hilum: Stable.    Lung parenchyma/Pleura: Unchanged bibasilar atelectasis. No visual pneumothorax.    Skeleton/soft tissues: Stable.    Impression:    Unchanged bibasilar atelectasis.        --- End of Report ---              SAMANTHA MARTE MD; Attending Radiologist  This document has been electronically signed. Jul 30 2021 12:19PM (07-30-21 @ 09:04)      CT      CARDIOLOGY TESTING  12 Lead ECG:   Ventricular Rate 142 BPM    Atrial Rate 142 BPM    P-R Interval 152 ms    QRS Duration 88 ms    Q-T Interval 342 ms    QTC Calculation(Bazett) 526 ms    P Axis 88 degrees    R Axis 88 degrees    T Axis 72 degrees    Diagnosis Line Sinus tachycardia  Nonspecific ST abnormality  Abnormal ECG    Confirmed by Greg Powell ([id number]) on 7/29/2021 6:06:57 AM (07-28-21 @ 21:17)      MEDICATIONS  aspirin enteric coated 81 Oral daily  atorvastatin 80 Oral at bedtime  cefTRIAXone   IVPB 2000 IV Intermittent every 24 hours  chlorhexidine 4% Liquid 1 Topical daily  enoxaparin Injectable 40 SubCutaneous at bedtime  metroNIDAZOLE  IVPB     metroNIDAZOLE  IVPB 500 IV Intermittent every 8 hours  pantoprazole Infusion 8 IV Continuous <Continuous>  senna 2 Oral at bedtime      WEIGHT  Weight (kg): 95.3 (07-29-21 @ 11:20)      ANTIBIOTICS:  cefTRIAXone   IVPB 2000 milliGRAM(s) IV Intermittent every 24 hours  metroNIDAZOLE  IVPB      metroNIDAZOLE  IVPB 500 milliGRAM(s) IV Intermittent every 8 hours      All available historical records have been reviewed

## 2021-08-01 NOTE — PROGRESS NOTE ADULT - ASSESSMENT
Patient is a 60 y/o M w/ PMH of HTN, CAD s/p PCI (9/25/19, Dr. Mcclure) on aspirin and brilinta (ticagrelor), cholecystitis s/p cholecystectomy by Dr Avalos in 2020 presents with fever, chills and severe epigastric abdominal pain x 1 day and admitted to ICU for management of septic shock secondary to cholangitis.      #Septic shock  #Cholangitis s/p ERCP with stent placement  -Keep NPO for today  -Monitor for post-ERCP complications  -Continue IV antibiotics, on ceftriaxone and flagyl per ID  -7/28 Bcx +ve for Klebsiella (non-ESBL)  -Repeat ERCP as outpatient for stent removal and extension of sphincterotomy  -c/w levophed  -c/w IVF @ 75cc/hr  - c/w tylenol PRN if spikes fever  - morphine PRN for pain    #Hgb drop in setting of hematemesis  - repeat CBC shows hgb stable at 12  - no need for scope at this time  - continue to monitor on PM labs  - holding ASA, brilinta, and AC for now    #Nausea and vomiting  - c/w zofran PRN for nausea/vomiting    #h/o CAD s/p PCI in 2019  - holding ASA, brilinta, and AC for now  - c/s atorvastatin      #Handoff  - f/up GI recs to restart diet, DAPT, and AC

## 2021-08-01 NOTE — PROGRESS NOTE ADULT - SUBJECTIVE AND OBJECTIVE BOX
OVERNIGHT EVENTS: events noted, on protonix drip    Vital Signs Last 24 Hrs  T(C): 35.7 (01 Aug 2021 08:00), Max: 36.8 (31 Jul 2021 20:00)  T(F): 96.2 (01 Aug 2021 08:00), Max: 98.3 (31 Jul 2021 20:00)  HR: 64 (01 Aug 2021 08:00) (62 - 94)  BP: 120/71 (01 Aug 2021 08:00) (115/71 - 153/79)  BP(mean): 86 (01 Aug 2021 08:00) (85 - 105)  RR: 14 (01 Aug 2021 08:00) (14 - 23)  SpO2: 100% (01 Aug 2021 08:00) (92% - 100%)    PHYSICAL EXAMINATION:    GENERAL: comfortable    HEENT: Head is normocephalic and atraumatic.    NECK: Supple.    LUNGS: dec bs both bases    HEART: Regular rate and rhythm without murmur.    ABDOMEN: Soft, nontender, and nondistended.      EXTREMITIES: Without any cyanosis, clubbing, rash, lesions or edema.    NEUROLOGIC: Grossly intact.    SKIN: No ulceration or induration present.      LABS:                        11.8   7.31  )-----------( 99       ( 31 Jul 2021 04:44 )             35.8     07-31    139  |  104  |  13  ----------------------------<  102<H>  4.0   |  25  |  0.8    Ca    8.1<L>      31 Jul 2021 04:44  Mg     1.9     07-31    TPro  4.9<L>  /  Alb  2.9<L>  /  TBili  2.3<H>  /  DBili  x   /  AST  57<H>  /  ALT  160<H>  /  AlkPhos  169<H>  07-31    PT/INR - ( 30 Jul 2021 16:00 )   PT: 17.00 sec;   INR: 1.48 ratio         PTT - ( 30 Jul 2021 16:00 )  PTT:33.6 sec                      07-31-21 @ 07:01  -  08-01-21 @ 07:00  --------------------------------------------------------  IN: 1310 mL / OUT: 2750 mL / NET: -1440 mL    08-01-21 @ 07:01  -  08-01-21 @ 09:06  --------------------------------------------------------  IN: 20 mL / OUT: 600 mL / NET: -580 mL        MICROBIOLOGY:  Culture Results:   No growth to date. (07-30 @ 05:18)  Culture Results:   No growth (07-29 @ 00:00)  Culture Results:   Growth in aerobic and anaerobic bottles: Klebsiella pneumoniae  ***Blood Panel PCR results on this specimen are available  approximately 3 hours after the Gram stain result.***  Gram stain, PCR, and/or culture results may not always  correspond dueto difference in methodologies.  ************************************************************  This PCR assay was performed by multiplex PCR. This  Assay tests for 66 bacterial and resistance gene targets.  Please refer to the [facility] test directory  at [email or web address] for details. (07-28 @ 20:49)  Culture Results:   Growth in aerobic and anaerobic bottles: Klebsiella pneumoniae  See previous culture [id number] (07-28 @ 20:49)      MEDICATIONS  (STANDING):  aspirin enteric coated 81 milliGRAM(s) Oral daily  atorvastatin 80 milliGRAM(s) Oral at bedtime  cefTRIAXone   IVPB 2000 milliGRAM(s) IV Intermittent every 24 hours  chlorhexidine 4% Liquid 1 Application(s) Topical daily  enoxaparin Injectable 40 milliGRAM(s) SubCutaneous at bedtime  metroNIDAZOLE  IVPB      metroNIDAZOLE  IVPB 500 milliGRAM(s) IV Intermittent every 8 hours  norepinephrine Infusion 0.05 MICROgram(s)/kG/Min (9.36 mL/Hr) IV Continuous <Continuous>  pantoprazole Infusion 8 mG/Hr (10 mL/Hr) IV Continuous <Continuous>  senna 2 Tablet(s) Oral at bedtime    MEDICATIONS  (PRN):  ibuprofen  Tablet. 400 milliGRAM(s) Oral every 6 hours PRN Mild Pain (1 - 3), Moderate Pain (4 - 6)  morphine  - Injectable 2 milliGRAM(s) IV Push every 4 hours PRN Moderate Pain (4 - 6)  ondansetron Injectable 4 milliGRAM(s) IV Push every 6 hours PRN Nausea and/or Vomiting      RADIOLOGY & ADDITIONAL STUDIES:

## 2021-08-01 NOTE — PROGRESS NOTE ADULT - SUBJECTIVE AND OBJECTIVE BOX
VICKY SPENCER 61y Male  MRN#: [id number]   CODE STATUS: FULL    Hospital Day: 3d    Pt is currently admitted with the primary diagnosis of septic shock 2/2 cholangitis.    SUBJECTIVE  Hospital Course  HPI: Patient is a 60 y/o M w/ PMH of HTN, CAD s/p PCI (9/25/19, Dr. Mcclure) on aspirin and brilinta (ticagrelor), cholecystitis s/p cholecystectomy by Dr Avalos in 2020 presents with fever, chills and severe epigastric abdominal pain x 1 day. Per patient, he developed severe epigastric abdominal pain yesterday morning (7/28/2021) and went to our [facility] for evaluation.  He was told he has gas around his pancreas and was discharged home in the afternoon. Then later in the evening around 9pm pt developed shaking chills with low fever with persistent epigastric pain, associated with nausea and non bilious vomiting x 2 so he came in to the hospital for further evaluation.     ED: Patient was found to be febrile with Tmax of 106, hypotensive with BP of 80/50s, and tachycardiac to the 150s. Patient was started on levophed for BP support. He received 3L of fluid bolus in the ED. His CT abdomen and pelvis was significant for mild inflammatory changes about the nondilated CBD which may reflect cholangitis. T bili 4.7.  GI was consulted and planned for ERCP with stent placement.    MICU:  7/29: ERCP with stent placement.        Overnight events   No acute events overnight.    Subjective complaints       Present Today:   - Phillips:  No [ X ], Yes [   ] : Indication:     - Type of IV Access:       .. CVC/Piccline:  No [  ], Yes [  X ] : Indication: placed 7/29      .. Midline: No [ X ], Yes [   ] : Indication:                                             ----------------------------------------------------------  OBJECTIVE  PAST MEDICAL & SURGICAL HISTORY  CAD in native artery  stents 9/2019    HTN (hypertension)    No significant past surgical history                                              -----------------------------------------------------------  ALLERGIES:  No Known Allergies                                            ------------------------------------------------------------    HOME MEDICATIONS  Home Medications:  acetaminophen 325 mg oral tablet: 2 tab(s) orally every 6 hours (03 Jun 2020 08:33)                           MEDICATIONS:  STANDING MEDICATIONS  aspirin enteric coated 81 milliGRAM(s) Oral daily  atorvastatin 80 milliGRAM(s) Oral at bedtime  cefTRIAXone   IVPB 2000 milliGRAM(s) IV Intermittent every 24 hours  chlorhexidine 4% Liquid 1 Application(s) Topical daily  enoxaparin Injectable 40 milliGRAM(s) SubCutaneous at bedtime  metroNIDAZOLE  IVPB      metroNIDAZOLE  IVPB 500 milliGRAM(s) IV Intermittent every 8 hours  norepinephrine Infusion 0.05 MICROgram(s)/kG/Min IV Continuous <Continuous>  pantoprazole Infusion 8 mG/Hr IV Continuous <Continuous>  senna 2 Tablet(s) Oral at bedtime    PRN MEDICATIONS  ibuprofen  Tablet. 400 milliGRAM(s) Oral every 6 hours PRN  morphine  - Injectable 2 milliGRAM(s) IV Push every 4 hours PRN  ondansetron Injectable 4 milliGRAM(s) IV Push every 6 hours PRN                                            ------------------------------------------------------------  VITAL SIGNS: Last 24 Hours  T(C): 36.8 (01 Aug 2021 00:00), Max: 36.8 (31 Jul 2021 20:00)  T(F): 98.2 (01 Aug 2021 00:00), Max: 98.3 (31 Jul 2021 20:00)  HR: 62 (01 Aug 2021 02:00) (62 - 94)  BP: 119/66 (01 Aug 2021 02:00) (119/66 - 153/79)  BP(mean): 87 (01 Aug 2021 02:00) (87 - 105)  RR: 15 (01 Aug 2021 02:00) (15 - 23)  SpO2: 99% (01 Aug 2021 02:00) (92% - 100%)      07-30-21 @ 07:01  -  07-31-21 @ 07:00  --------------------------------------------------------  IN: 2359.2 mL / OUT: 1150 mL / NET: 1209.2 mL    07-31-21 @ 07:01  -  08-01-21 @ 03:23  --------------------------------------------------------  IN: 1150 mL / OUT: 2750 mL / NET: -1600 mL                                             --------------------------------------------------------------  LABS:                        11.8   7.31  )-----------( 99       ( 31 Jul 2021 04:44 )             35.8     07-31    139  |  104  |  13  ----------------------------<  102<H>  4.0   |  25  |  0.8    Ca    8.1<L>      31 Jul 2021 04:44  Mg     1.9     07-31    TPro  4.9<L>  /  Alb  2.9<L>  /  TBili  2.3<H>  /  DBili  x   /  AST  57<H>  /  ALT  160<H>  /  AlkPhos  169<H>  07-31    PT/INR - ( 30 Jul 2021 16:00 )   PT: 17.00 sec;   INR: 1.48 ratio         PTT - ( 30 Jul 2021 16:00 )  PTT:33.6 sec            Culture - Blood (collected 30 Jul 2021 05:18)  Source: .Blood None  Preliminary Report (31 Jul 2021 14:01):    No growth to date.      Culture - Urine (collected 29 Jul 2021 00:00)  Source: Clean Catch Clean Catch (Midstream)  Final Report (30 Jul 2021 10:33):    No growth    Culture - Blood (collected 28 Jul 2021 20:49)  Source: .Blood Blood-Peripheral  Gram Stain (29 Jul 2021 13:23):    Growth in aerobic and anaerobic bottles: Gram Negative Rods  Preliminary Report (29 Jul 2021 13:23):    Growth in aerobic and anaerobic bottles: Gram Negative Rods    Culture - Blood (collected 28 Jul 2021 20:49)  Source: .Blood Blood-Peripheral  Gram Stain (29 Jul 2021 14:58):    Growth in anaerobic bottle: Gram Negative Rods    Growth in aerobic bottle: Gram Negative Rods  Preliminary Report (30 Jul 2021 11:56):    Growth in aerobic and anaerobic bottles: Klebsiella pneumoniae    ***Blood Panel PCR results on this specimen are available    approximately 3 hours after the Gram stain result.***    Gram stain, PCR, and/or culture results may not always    correspond dueto difference in methodologies.    ************************************************************    This PCR assay was performed by multiplex PCR. This    Assay tests for 66 bacterial and resistance gene targets.    Please refer to the [facility] test directory    at [email or web address] for details.  Organism: Blood Culture PCR (29 Jul 2021 15:25)  Organism: Blood Culture PCR (29 Jul 2021 15:25)                                                -------------------------------------------------------------  RADIOLOGY:  EXAM:  XR KUB 1 VIEW, 07/30/2021    Support devices: CBD stent in radiographically appropriate position. Post cholecystectomy.  Impression:  Nonobstructive bowel gas pattern.      EXAM:  XR CHEST PORTABLE URGENT 1V,  07/30/2021    Support devices: Stable right IJ central venous catheter.  Impression:  Unchanged bibasilar atelectasis.      ERCP Report Date: 7/29/2021 12:15 PM   Impressions: Cholangitis s/p small biliary sphincterotomy. Sludge, pus and pigmented debris extracted from the CBD which measured 4 mm and had a small distal filling defect  s/p 8.5 Fr x 7 cm plastic biliary stent placement.       EXAM:  US ABDOMEN RT UPR QUADRANT, 07/29/2021    IMPRESSION:  Postcholecystectomy without evidence of CBD dilatation.      EXAM:  CT ABDOMEN AND PELVIS IC        EXAM:  CT CHEST IC        PROCEDURE DATE:  07/29/2021    IMPRESSION:  Mild inflammatory changes about the nondilated CBD may reflect evidence of cholangitis.  Otherwise, no evidence of acute thoracic, abdominal or pelvic pathology.  Stable pulmonary nodules.                                            --------------------------------------------------------------    PHYSICAL EXAM:  General: lying in bed  HEENT: mild icterus, supple, nontraumatic  LUNGS: CTA, no wheezing, rales, or rhonchi  HEART: RRR, S1/S2 detected  ABDOMEN: soft, nontender, +BS  EXT: moves all extremities  NEURO: CN grossly intact, no focal deficits  SKIN: warm, dry, jaundice                VICKY SPENCER 61y Male  MRN#: [id number]   CODE STATUS: FULL    Hospital Day: 3d    Pt is currently admitted with the primary diagnosis of septic shock 2/2 cholangitis.    SUBJECTIVE  Hospital Course  HPI: Patient is a 62 y/o M w/ PMH of HTN, CAD s/p PCI (9/25/19, Dr. Mcclure) on aspirin and brilinta (ticagrelor), cholecystitis s/p cholecystectomy by Dr Avalos in 2020 presents with fever, chills and severe epigastric abdominal pain x 1 day. Per patient, he developed severe epigastric abdominal pain yesterday morning (7/28/2021) and went to our [facility] for evaluation.  He was told he has gas around his pancreas and was discharged home in the afternoon. Then later in the evening around 9pm pt developed shaking chills with low fever with persistent epigastric pain, associated with nausea and non bilious vomiting x 2 so he came in to the hospital for further evaluation.     ED: Patient was found to be febrile with Tmax of 106, hypotensive with BP of 80/50s, and tachycardiac to the 150s. Patient was started on levophed for BP support. He received 3L of fluid bolus in the ED. His CT abdomen and pelvis was significant for mild inflammatory changes about the nondilated CBD which may reflect cholangitis. T bili 4.7.  GI was consulted and planned for ERCP with stent placement.    MICU:  7/29: ERCP with stent placement.        Overnight events   No acute events overnight.    Subjective complaints   Patient seen and examined at bedside.  Endorsed HA overnight which resolvd with ibuprofen    Present Today:   - Phillips:  No [ X ], Yes [   ] : Indication:     - Type of IV Access:       .. CVC/Piccline:  No [  ], Yes [  X ] : Indication: placed 7/29      .. Midline: No [ X ], Yes [   ] : Indication:                                             ----------------------------------------------------------  OBJECTIVE  PAST MEDICAL & SURGICAL HISTORY  CAD in native artery  stents 9/2019    HTN (hypertension)    No significant past surgical history                                              -----------------------------------------------------------  ALLERGIES:  No Known Allergies                                            ------------------------------------------------------------    HOME MEDICATIONS  Home Medications:  acetaminophen 325 mg oral tablet: 2 tab(s) orally every 6 hours (03 Jun 2020 08:33)                           MEDICATIONS:  STANDING MEDICATIONS  aspirin enteric coated 81 milliGRAM(s) Oral daily  atorvastatin 80 milliGRAM(s) Oral at bedtime  cefTRIAXone   IVPB 2000 milliGRAM(s) IV Intermittent every 24 hours  chlorhexidine 4% Liquid 1 Application(s) Topical daily  enoxaparin Injectable 40 milliGRAM(s) SubCutaneous at bedtime  metroNIDAZOLE  IVPB      metroNIDAZOLE  IVPB 500 milliGRAM(s) IV Intermittent every 8 hours  norepinephrine Infusion 0.05 MICROgram(s)/kG/Min IV Continuous <Continuous>  pantoprazole Infusion 8 mG/Hr IV Continuous <Continuous>  senna 2 Tablet(s) Oral at bedtime    PRN MEDICATIONS  ibuprofen  Tablet. 400 milliGRAM(s) Oral every 6 hours PRN  morphine  - Injectable 2 milliGRAM(s) IV Push every 4 hours PRN  ondansetron Injectable 4 milliGRAM(s) IV Push every 6 hours PRN                                            ------------------------------------------------------------  VITAL SIGNS: Last 24 Hours  T(C): 36.8 (01 Aug 2021 00:00), Max: 36.8 (31 Jul 2021 20:00)  T(F): 98.2 (01 Aug 2021 00:00), Max: 98.3 (31 Jul 2021 20:00)  HR: 62 (01 Aug 2021 02:00) (62 - 94)  BP: 119/66 (01 Aug 2021 02:00) (119/66 - 153/79)  BP(mean): 87 (01 Aug 2021 02:00) (87 - 105)  RR: 15 (01 Aug 2021 02:00) (15 - 23)  SpO2: 99% (01 Aug 2021 02:00) (92% - 100%)      07-30-21 @ 07:01  -  07-31-21 @ 07:00  --------------------------------------------------------  IN: 2359.2 mL / OUT: 1150 mL / NET: 1209.2 mL    07-31-21 @ 07:01  -  08-01-21 @ 03:23  --------------------------------------------------------  IN: 1150 mL / OUT: 2750 mL / NET: -1600 mL                                             --------------------------------------------------------------  LABS:                        11.8   7.31  )-----------( 99       ( 31 Jul 2021 04:44 )             35.8     07-31    139  |  104  |  13  ----------------------------<  102<H>  4.0   |  25  |  0.8    Ca    8.1<L>      31 Jul 2021 04:44  Mg     1.9     07-31    TPro  4.9<L>  /  Alb  2.9<L>  /  TBili  2.3<H>  /  DBili  x   /  AST  57<H>  /  ALT  160<H>  /  AlkPhos  169<H>  07-31    PT/INR - ( 30 Jul 2021 16:00 )   PT: 17.00 sec;   INR: 1.48 ratio         PTT - ( 30 Jul 2021 16:00 )  PTT:33.6 sec            Culture - Blood (collected 30 Jul 2021 05:18)  Source: .Blood None  Preliminary Report (31 Jul 2021 14:01):    No growth to date.      Culture - Urine (collected 29 Jul 2021 00:00)  Source: Clean Catch Clean Catch (Midstream)  Final Report (30 Jul 2021 10:33):    No growth    Culture - Blood (collected 28 Jul 2021 20:49)  Source: .Blood Blood-Peripheral  Gram Stain (29 Jul 2021 13:23):    Growth in aerobic and anaerobic bottles: Gram Negative Rods  Preliminary Report (29 Jul 2021 13:23):    Growth in aerobic and anaerobic bottles: Gram Negative Rods    Culture - Blood (collected 28 Jul 2021 20:49)  Source: .Blood Blood-Peripheral  Gram Stain (29 Jul 2021 14:58):    Growth in anaerobic bottle: Gram Negative Rods    Growth in aerobic bottle: Gram Negative Rods  Preliminary Report (30 Jul 2021 11:56):    Growth in aerobic and anaerobic bottles: Klebsiella pneumoniae    ***Blood Panel PCR results on this specimen are available    approximately 3 hours after the Gram stain result.***    Gram stain, PCR, and/or culture results may not always    correspond dueto difference in methodologies.    ************************************************************    This PCR assay was performed by multiplex PCR. This    Assay tests for 66 bacterial and resistance gene targets.    Please refer to the [facility] test directory    at [email or web address] for details.  Organism: Blood Culture PCR (29 Jul 2021 15:25)  Organism: Blood Culture PCR (29 Jul 2021 15:25)                                                -------------------------------------------------------------  RADIOLOGY:  EXAM:  XR KUB 1 VIEW, 07/30/2021    Support devices: CBD stent in radiographically appropriate position. Post cholecystectomy.  Impression:  Nonobstructive bowel gas pattern.      EXAM:  XR CHEST PORTABLE URGENT 1V,  07/30/2021    Support devices: Stable right IJ central venous catheter.  Impression:  Unchanged bibasilar atelectasis.      ERCP Report Date: 7/29/2021 12:15 PM   Impressions: Cholangitis s/p small biliary sphincterotomy. Sludge, pus and pigmented debris extracted from the CBD which measured 4 mm and had a small distal filling defect  s/p 8.5 Fr x 7 cm plastic biliary stent placement.       EXAM:  US ABDOMEN RT UPR QUADRANT, 07/29/2021    IMPRESSION:  Postcholecystectomy without evidence of CBD dilatation.      EXAM:  CT ABDOMEN AND PELVIS IC        EXAM:  CT CHEST IC        PROCEDURE DATE:  07/29/2021    IMPRESSION:  Mild inflammatory changes about the nondilated CBD may reflect evidence of cholangitis.  Otherwise, no evidence of acute thoracic, abdominal or pelvic pathology.  Stable pulmonary nodules.                                            --------------------------------------------------------------    PHYSICAL EXAM:  General: lying in bed  HEENT: mild icterus, supple, nontraumatic  LUNGS: CTA, no wheezing, rales, or rhonchi  HEART: RRR, S1/S2 detected  ABDOMEN: soft, nontender, +BS  EXT: moves all extremities  NEURO: CN grossly intact, no focal deficits  SKIN: warm, dry, jaundice

## 2021-08-01 NOTE — PROGRESS NOTE ADULT - ASSESSMENT
ASSESSMENT   62 y/o M w/ PMH of HTN, CAD s/p PCI (9/25/19, Dr. Mcclure) on aspirin and brilinta, cholecystitis s/p cholecystectomy by Dr Avalos in 2020 presents with fever, chills and severe epigastric abdominal pain x 1 day. Found to have cholangitis and Kleb bacteremia     IMPRESSION  #Septic shock requiring pressors secondary to Klebsiella bacteremia secondary to cholangitis     s/p ERCP with stent    7/28 BCX Kleb (non-ESBL by PCR) (S)    RUQ Postcholecystectomy without evidence of CBD dilatation.    CTAP Mild inflammatory changes about the nondilated CBD may reflect evidence of cholangitis.  #Lactic acidosis  #Transaminitis   #Bilateral dependent atelectatic changes    Creatinine, Serum: 1.0 (07-30-21 @ 05:00)  Weight (kg): 95.3 (07-29-21 @ 11:20)    RECOMMENDATIONS.  - Ceftriaxone 2g q24h IV & Flagyl 500mg q8h IV  - on d/c PO cipro 500mg BID flagyl 500mg TID to complete 10 days post ERCP  - GI following  - Trend fever curve, WBC, LFTs      If any questions, please call or send a message on [facility] [employer]  Please continue to update ID with any pertinent new laboratory or radiographic findings  Spectra [id number]

## 2021-08-02 LAB
ALBUMIN SERPL ELPH-MCNC: 3 G/DL — LOW (ref 3.5–5.2)
ALP SERPL-CCNC: 169 U/L — HIGH (ref 30–115)
ALT FLD-CCNC: 116 U/L — HIGH (ref 0–41)
ANION GAP SERPL CALC-SCNC: 6 MMOL/L — LOW (ref 7–14)
AST SERPL-CCNC: 81 U/L — HIGH (ref 0–41)
BASOPHILS # BLD AUTO: 0.02 K/UL — SIGNIFICANT CHANGE UP (ref 0–0.2)
BASOPHILS NFR BLD AUTO: 0.4 % — SIGNIFICANT CHANGE UP (ref 0–1)
BILIRUB SERPL-MCNC: 1.1 MG/DL — SIGNIFICANT CHANGE UP (ref 0.2–1.2)
BUN SERPL-MCNC: 7 MG/DL — LOW (ref 10–20)
CALCIUM SERPL-MCNC: 8.2 MG/DL — LOW (ref 8.5–10.1)
CHLORIDE SERPL-SCNC: 105 MMOL/L — SIGNIFICANT CHANGE UP (ref 98–110)
CO2 SERPL-SCNC: 28 MMOL/L — SIGNIFICANT CHANGE UP (ref 17–32)
CREAT SERPL-MCNC: 0.7 MG/DL — SIGNIFICANT CHANGE UP (ref 0.7–1.5)
EOSINOPHIL # BLD AUTO: 0.14 K/UL — SIGNIFICANT CHANGE UP (ref 0–0.7)
EOSINOPHIL NFR BLD AUTO: 3 % — SIGNIFICANT CHANGE UP (ref 0–8)
GLUCOSE SERPL-MCNC: 98 MG/DL — SIGNIFICANT CHANGE UP (ref 70–99)
HCT VFR BLD CALC: 37 % — LOW (ref 42–52)
HGB BLD-MCNC: 12.1 G/DL — LOW (ref 14–18)
IMM GRANULOCYTES NFR BLD AUTO: 0.9 % — HIGH (ref 0.1–0.3)
LYMPHOCYTES # BLD AUTO: 1.34 K/UL — SIGNIFICANT CHANGE UP (ref 1.2–3.4)
LYMPHOCYTES # BLD AUTO: 28.9 % — SIGNIFICANT CHANGE UP (ref 20.5–51.1)
MAGNESIUM SERPL-MCNC: 2.1 MG/DL — SIGNIFICANT CHANGE UP (ref 1.8–2.4)
MCHC RBC-ENTMCNC: 28.4 PG — SIGNIFICANT CHANGE UP (ref 27–31)
MCHC RBC-ENTMCNC: 32.7 G/DL — SIGNIFICANT CHANGE UP (ref 32–37)
MCV RBC AUTO: 86.9 FL — SIGNIFICANT CHANGE UP (ref 80–94)
MONOCYTES # BLD AUTO: 0.56 K/UL — SIGNIFICANT CHANGE UP (ref 0.1–0.6)
MONOCYTES NFR BLD AUTO: 12.1 % — HIGH (ref 1.7–9.3)
NEUTROPHILS # BLD AUTO: 2.53 K/UL — SIGNIFICANT CHANGE UP (ref 1.4–6.5)
NEUTROPHILS NFR BLD AUTO: 54.7 % — SIGNIFICANT CHANGE UP (ref 42.2–75.2)
NRBC # BLD: 0 /100 WBCS — SIGNIFICANT CHANGE UP (ref 0–0)
PLATELET # BLD AUTO: 136 K/UL — SIGNIFICANT CHANGE UP (ref 130–400)
POTASSIUM SERPL-MCNC: 4.1 MMOL/L — SIGNIFICANT CHANGE UP (ref 3.5–5)
POTASSIUM SERPL-SCNC: 4.1 MMOL/L — SIGNIFICANT CHANGE UP (ref 3.5–5)
PROT SERPL-MCNC: 5 G/DL — LOW (ref 6–8)
RBC # BLD: 4.26 M/UL — LOW (ref 4.7–6.1)
RBC # FLD: 13.2 % — SIGNIFICANT CHANGE UP (ref 11.5–14.5)
SODIUM SERPL-SCNC: 139 MMOL/L — SIGNIFICANT CHANGE UP (ref 135–146)
WBC # BLD: 4.63 K/UL — LOW (ref 4.8–10.8)
WBC # FLD AUTO: 4.63 K/UL — LOW (ref 4.8–10.8)

## 2021-08-02 PROCEDURE — 99232 SBSQ HOSP IP/OBS MODERATE 35: CPT

## 2021-08-02 RX ORDER — PANTOPRAZOLE SODIUM 20 MG/1
40 TABLET, DELAYED RELEASE ORAL EVERY 12 HOURS
Refills: 0 | Status: DISCONTINUED | OUTPATIENT
Start: 2021-08-02 | End: 2021-08-03

## 2021-08-02 RX ORDER — TICAGRELOR 90 MG/1
90 TABLET ORAL EVERY 12 HOURS
Refills: 0 | Status: DISCONTINUED | OUTPATIENT
Start: 2021-08-02 | End: 2021-08-03

## 2021-08-02 RX ORDER — ATORVASTATIN CALCIUM 80 MG/1
40 TABLET, FILM COATED ORAL AT BEDTIME
Refills: 0 | Status: DISCONTINUED | OUTPATIENT
Start: 2021-08-02 | End: 2021-08-03

## 2021-08-02 RX ADMIN — ENOXAPARIN SODIUM 40 MILLIGRAM(S): 100 INJECTION SUBCUTANEOUS at 22:23

## 2021-08-02 RX ADMIN — Medication 81 MILLIGRAM(S): at 14:05

## 2021-08-02 RX ADMIN — Medication 100 MILLIGRAM(S): at 22:23

## 2021-08-02 RX ADMIN — Medication 400 MILLIGRAM(S): at 02:15

## 2021-08-02 RX ADMIN — Medication 400 MILLIGRAM(S): at 02:45

## 2021-08-02 RX ADMIN — ATORVASTATIN CALCIUM 40 MILLIGRAM(S): 80 TABLET, FILM COATED ORAL at 23:33

## 2021-08-02 RX ADMIN — Medication 100 MILLIGRAM(S): at 14:05

## 2021-08-02 RX ADMIN — TICAGRELOR 90 MILLIGRAM(S): 90 TABLET ORAL at 18:42

## 2021-08-02 RX ADMIN — CEFTRIAXONE 100 MILLIGRAM(S): 500 INJECTION, POWDER, FOR SOLUTION INTRAMUSCULAR; INTRAVENOUS at 15:51

## 2021-08-02 RX ADMIN — Medication 400 MILLIGRAM(S): at 08:26

## 2021-08-02 RX ADMIN — Medication 100 MILLIGRAM(S): at 05:51

## 2021-08-02 RX ADMIN — Medication 400 MILLIGRAM(S): at 09:00

## 2021-08-02 RX ADMIN — PANTOPRAZOLE SODIUM 40 MILLIGRAM(S): 20 TABLET, DELAYED RELEASE ORAL at 18:42

## 2021-08-02 NOTE — PROGRESS NOTE ADULT - SUBJECTIVE AND OBJECTIVE BOX
Patient is a 60 y/o with PMHx of HTN, CAD s/p PCI (9/25/19, Dr. Mcclure) on aspirin and Brilinta cholecystitis s/p cholecystectomy by Dr Avalos in 2020 who presented to [facility] with abdominal pain. Patient met SIRS criteria on presentation and high clinical suspicion remained for cholangitis. Patient s/p ERCP with sphincterotomy, drainage of pus from CBD, and placement of a Biliary stent. Patient overall much improved.     PAST MEDICAL & SURGICAL HISTORY:  CAD requring PCI with stents   HTN (hypertension)  CCY      MEDICATIONS  (STANDING):  atorvastatin 80 milliGRAM(s) Oral at bedtime  cefepime   IVPB 2000 milliGRAM(s) IV Intermittent every 8 hours  lactated ringers Bolus 1000 milliLiter(s) IV Bolus once  lactated ringers. 1000 milliLiter(s) (150 mL/Hr) IV Continuous <Continuous>  metroNIDAZOLE  IVPB      metroNIDAZOLE  IVPB 500 milliGRAM(s) IV Intermittent once  metroNIDAZOLE  IVPB 500 milliGRAM(s) IV Intermittent every 8 hours  norepinephrine Infusion 0.05 MICROgram(s)/kG/Min (9.36 mL/Hr) IV Continuous <Continuous>  senna 2 Tablet(s) Oral at bedtime    MEDICATIONS  (PRN):  morphine  - Injectable 2 milliGRAM(s) IV Push every 4 hours PRN Moderate Pain (4 - 6)  ondansetron Injectable 4 milliGRAM(s) IV Push every 6 hours PRN Nausea and/or Vomiting      Allergies  No Known Allergies      Review of Systems  General:  See HPI  HEENT: Denies Trouble Swallowing ,Denies  Sore Throat , Denies Change in hearing/vision/speech ,Denies Dizziness    Cardio: Denies  Chest Pain , Palpitations    Respiratory: Denies worsening of SOB, Denies Cough  Abdomen: See detailed HPI  Neuro: Denies Headache Denies Dizziness, Denies Paresthesias  MSK: Denies pain in Bones/Joints/Muscles   Psych: Patient denies depression, denies suicidal or homicidal ideations  Integ: Patient Denies rash, or new skin lesions       Vital Signs   T(F): 95.4 (02 Aug 2021 08:00), Max: 98.6 (01 Aug 2021 20:00)  HR: 62 (02 Aug 2021 11:00) (54 - 90)  BP: 101/65 (02 Aug 2021 11:00) (101/65 - 156/83)  BP(mean): 78 (02 Aug 2021 11:00) (78 - 118)  RR: 15 (02 Aug 2021 11:00) (11 - 30)  SpO2: 96% (02 Aug 2021 11:00) (95% - 100%)  Physical Exam  Gen: NAD, diaphoretic   HEENT: NC/AT, Mucosal Membranes Moist  Cardio: S1/S2 No S3/S4  Resp: CTA B/L  Abdomen: Soft, ND/NT  Neuro: AAOx3  Extremities: FROM x 4      LABS:                        12.1   4.63  )-----------( 136      ( 02 Aug 2021 04:53 )             37.0     08-02    139  |  105  |  7<L>  ----------------------------<  98  4.1   |  28  |  0.7    Ca    8.2<L>      02 Aug 2021 04:53  Mg     2.1     08-02    TPro  5.0<L>  /  Alb  3.0<L>  /  TBili  1.1  /  DBili  x   /  AST  81<H>  /  ALT  116<H>  /  AlkPhos  169<H>  08-02      RADIOLOGY & ADDITIONAL STUDIES:  US Abdomen Upper Quadrant Right 07.29.21   IMPRESSION:    Postcholecystectomy without evidence of CBD dilatation.  CBD 5mm    CT of Abdomen   IMPRESSION:  Mild inflammatory changes about the nondilated CBD may reflect evidence of cholangitis.    Otherwise, no evidence of acute thoracic, abdominal or pelvic pathology.    Stable pulmonary nodules.

## 2021-08-02 NOTE — CHART NOTE - NSCHARTNOTEFT_GEN_A_CORE
SUBJECTIVE:    Patient is a 61y old Male who presents with a chief complaint of cholangitis (02 Aug 2021 08:46)    Currently admitted to medicine with the primary diagnosis of Cholangitis     24 hour update:  Today is hospital day 4d.  s/p ERCP w stent placement. This morning he is resting comfortably in bed and reports no new issues or overnight events.     PAST MEDICAL & SURGICAL HISTORY  CAD in native artery  stents 9/2019    HTN (hypertension)    No significant past surgical history      SOCIAL HISTORY:    ALLERGIES:  No Known Allergies    MEDICATIONS:  STANDING MEDICATIONS  aspirin enteric coated 81 milliGRAM(s) Oral daily  atorvastatin 80 milliGRAM(s) Oral at bedtime  cefTRIAXone   IVPB 2000 milliGRAM(s) IV Intermittent every 24 hours  chlorhexidine 4% Liquid 1 Application(s) Topical daily  enoxaparin Injectable 40 milliGRAM(s) SubCutaneous at bedtime  metroNIDAZOLE  IVPB      metroNIDAZOLE  IVPB 500 milliGRAM(s) IV Intermittent every 8 hours  pantoprazole  Injectable 40 milliGRAM(s) IV Push every 12 hours  senna 2 Tablet(s) Oral at bedtime  ticagrelor 90 milliGRAM(s) Oral every 12 hours    PRN MEDICATIONS  ibuprofen  Tablet. 400 milliGRAM(s) Oral every 6 hours PRN  morphine  - Injectable 2 milliGRAM(s) IV Push every 4 hours PRN  ondansetron Injectable 4 milliGRAM(s) IV Push every 6 hours PRN    VITALS:   T(F): 95.4  HR: 70  BP: 109/67  RR: 15  SpO2: 96%    PHYSICAL EXAM:  GENERAL: NAD  NERVOUS SYSTEM: AAOx3, No focal deficits  CHEST/LUNG: +bs b/l  HEART: +s1s2 RRR  ABDOMEN: soft, NT/ND  EXTREMITIES:  2+ Peripheral Pulses, No edema    LABS:                        12.1   4.63  )-----------( 136      ( 02 Aug 2021 04:53 )             37.0     139  |  105  |  7<L>  ----------------------------<  98  4.1   |  28  |  0.7    Ca    8.2<L>      02 Aug 2021 04:53  Mg     2.1     08-02    TPro  5.0<L>  /  Alb  3.0<L>  /  TBili  1.1  /  DBili  x   /  AST  81<H>  /  ALT  116<H>  /  AlkPhos  169<H>  08-02    A/P    #Septic shock requiring pressors 2/2 Klebsiella bacteremia d/t Cholangitis - s/p ERCP with stent - improving  - GI and ID following   - cont abx, cont ppi  - off pressors and drips  - stable for downgrade to floors for further monitoring  - advance diet as tolerated, pain control prn, nausea control prn    #Blood loss anemia from hematemesis - hgb stable  - monitor CB, keep active t&S, transfuse <7    #h/o CAD s/p PCI in 2019  - cont asa, statin; restarted brilinta

## 2021-08-02 NOTE — PROGRESS NOTE ADULT - ASSESSMENT
Patient is a 62 y/o with PMHx of HTN, CAD s/p PCI (9/25/19, Dr. Mcclure) on aspirin and Brilinta cholecystitis s/p cholecystectomy by Dr Avalos in 2020 who presented to [facility] with abdominal pain. Patient met SIRS criteria on presentation and high clinical suspicion remained for cholangitis. Patient s/p ERCP with sphincterotomy, drainage of pus from CBD, and placement of a Biliary stent. Patient overall much improved. No evidence of GI bleeding. May advance diet to low fat. Brilinta may be started today. Pantoprazole PO BID.       Abdominal Pain/ Cholangitis  - Met SIRS criteria in ED  - CT scan with CBD of 5 mm but inflammation noted around the CBD  - Appreciate ID consult and ABX adjusted for Gram Negative bacteremia   - ERCP done 7/29 with sphincterotomy, drainage of pus, and placement of plastic cbd stent   - May advance diet to low fat  - Pantoprazole 40mg BID PO  - ASA was started and ok to start Brilinta   - Will need removal of stent within 4-6 weeks [phone]   - Likely downgrade today   - Will follow

## 2021-08-02 NOTE — PROGRESS NOTE ADULT - SUBJECTIVE AND OBJECTIVE BOX
CHIEF COMPLIANT:    OVERNIGHT EVENTS: events noted, no drips, feels beter    Vital Signs Last 24 Hrs  T(C): 35.2 (02 Aug 2021 08:00), Max: 37 (01 Aug 2021 20:00)  T(F): 95.4 (02 Aug 2021 08:00), Max: 98.6 (01 Aug 2021 20:00)  HR: 54 (02 Aug 2021 08:00) (54 - 90)  BP: 114/71 (02 Aug 2021 08:00) (114/71 - 156/83)  BP(mean): 87 (02 Aug 2021 08:00) (79 - 118)  RR: 13 (02 Aug 2021 08:00) (11 - 30)  SpO2: 100% (02 Aug 2021 08:00) (95% - 100%)    PHYSICAL EXAMINATION:    GENERAL: The patient is awake and alert in no apparent distress.     HEENT: Head is normocephalic and atraumatic.     NECK: Supple.    LUNGS: dec bs r base    HEART: Regular rate and rhythm without murmur.    ABDOMEN: Soft, nontender, and nondistended.      EXTREMITIES: Without any cyanosis, clubbing, rash, lesions or edema.    NEUROLOGIC: Grossly intact.    SKIN: No ulceration or induration present.      LABS:                        12.1   4.63  )-----------( 136      ( 02 Aug 2021 04:53 )             37.0     08-02    139  |  105  |  7<L>  ----------------------------<  98  4.1   |  28  |  0.7    Ca    8.2<L>      02 Aug 2021 04:53  Mg     2.1     08-02    TPro  5.0<L>  /  Alb  3.0<L>  /  TBili  1.1  /  DBili  x   /  AST  81<H>  /  ALT  116<H>  /  AlkPhos  169<H>  08-02 08-01-21 @ 07:01  -  08-02-21 @ 07:00  --------------------------------------------------------  IN: 690 mL / OUT: 2800 mL / NET: -2110 mL    08-02-21 @ 07:01  -  08-02-21 @ 08:46  --------------------------------------------------------  IN: 20 mL / OUT: 450 mL / NET: -430 mL        MICROBIOLOGY:  Culture Results:   No growth to date. (07-30 @ 05:18)      MEDICATIONS  (STANDING):  aspirin enteric coated 81 milliGRAM(s) Oral daily  atorvastatin 80 milliGRAM(s) Oral at bedtime  cefTRIAXone   IVPB 2000 milliGRAM(s) IV Intermittent every 24 hours  chlorhexidine 4% Liquid 1 Application(s) Topical daily  enoxaparin Injectable 40 milliGRAM(s) SubCutaneous at bedtime  metroNIDAZOLE  IVPB      metroNIDAZOLE  IVPB 500 milliGRAM(s) IV Intermittent every 8 hours  pantoprazole Infusion 8 mG/Hr (10 mL/Hr) IV Continuous <Continuous>  senna 2 Tablet(s) Oral at bedtime    MEDICATIONS  (PRN):  ibuprofen  Tablet. 400 milliGRAM(s) Oral every 6 hours PRN Mild Pain (1 - 3), Moderate Pain (4 - 6)  morphine  - Injectable 2 milliGRAM(s) IV Push every 4 hours PRN Moderate Pain (4 - 6)  ondansetron Injectable 4 milliGRAM(s) IV Push every 6 hours PRN Nausea and/or Vomiting      RADIOLOGY & ADDITIONAL STUDIES:

## 2021-08-02 NOTE — PROGRESS NOTE ADULT - ASSESSMENT
patient is a 62 y/o M w/ PMH of HTN, CAD s/p PCI (9/25/19, Dr. Mcclure) on aspirin and brilinta, cholecystitis s/p cholecystectomy by Dr Avalos in 2020 presents with fever, chills and severe epigastric abdominal pain x 1 day.     IMPRESSION:  Sepsis present on admission impoving  Septic Shock/ klebsiella bacteremia resolved  acute cholangitis sp ERCP  HO CAD s/p PCI in 2019  HO Cholecystectomy  Pulmonary nodules  UGI stable hb    PLAN:    CNS: Avoid CNS depressants. Pain control    HEENT: Oral care    PULMONARY:  HOB @ 45 degrees.  Keep O2 > 94%. aspiration precautions Pulmonary toilet.     CARDIOVASCULAR: cardio f/up, restart brilinta if cleared by GI    GI: GI prophylaxis.  protonix, advance diet    RENAL:  Follow up lytes.  Correct as needed    INFECTIOUS DISEASE: per ID    HEMATOLOGICAL:  DVT prophylaxis    ENDOCRINE:  Follow up FS.  Insulin protocol if needed.    MUSCULOSKELETAL: bed rest    Dispo: floor  full code

## 2021-08-02 NOTE — PROGRESS NOTE ADULT - ATTENDING COMMENTS
Patient seen and examined assessment and plan above
Patient seen and examined with [facility] . Plan as above and modified where needed.
pt who presents with acute cholangitis, s/p ERCP with sphincterotomy, stent placement. currently LFTs improving and sepsis improving. will need ERCP stent removal in 4-6 weeks

## 2021-08-03 ENCOUNTER — TRANSCRIPTION ENCOUNTER (OUTPATIENT)
Age: 61
End: 2021-08-03

## 2021-08-03 VITALS — TEMPERATURE: 96 F | HEART RATE: 82 BPM | SYSTOLIC BLOOD PRESSURE: 147 MMHG | DIASTOLIC BLOOD PRESSURE: 73 MMHG

## 2021-08-03 PROCEDURE — 99232 SBSQ HOSP IP/OBS MODERATE 35: CPT

## 2021-08-03 PROCEDURE — 71045 X-RAY EXAM CHEST 1 VIEW: CPT | Mod: 26

## 2021-08-03 PROCEDURE — 99233 SBSQ HOSP IP/OBS HIGH 50: CPT

## 2021-08-03 RX ORDER — METRONIDAZOLE 500 MG
1 TABLET ORAL
Qty: 21 | Refills: 0
Start: 2021-08-03 | End: 2021-08-09

## 2021-08-03 RX ORDER — PANTOPRAZOLE SODIUM 20 MG/1
1 TABLET, DELAYED RELEASE ORAL
Qty: 60 | Refills: 0
Start: 2021-08-03 | End: 2021-09-01

## 2021-08-03 RX ORDER — MOXIFLOXACIN HYDROCHLORIDE TABLETS, 400 MG 400 MG/1
1 TABLET, FILM COATED ORAL
Qty: 14 | Refills: 0
Start: 2021-08-03 | End: 2021-08-09

## 2021-08-03 RX ADMIN — TICAGRELOR 90 MILLIGRAM(S): 90 TABLET ORAL at 06:34

## 2021-08-03 RX ADMIN — Medication 81 MILLIGRAM(S): at 11:48

## 2021-08-03 RX ADMIN — Medication 100 MILLIGRAM(S): at 06:34

## 2021-08-03 RX ADMIN — PANTOPRAZOLE SODIUM 40 MILLIGRAM(S): 20 TABLET, DELAYED RELEASE ORAL at 06:37

## 2021-08-03 NOTE — PROGRESS NOTE ADULT - SUBJECTIVE AND OBJECTIVE BOX
VICKY SPENCER  61y  [facility] [id number] [address]      Patient is a 61y old  Male who presents with a chief complaint of cholangitis (02 Aug 2021 12:08)      INTERVAL HPI/OVERNIGHT EVENTS:      no acute events overnight , patient is lying bed comfortably   REVIEW OF SYSTEMS:  CONSTITUTIONAL: No fever, weight loss, or fatigue  EYES: No eye pain, visual disturbances, or discharge  ENMT:  No difficulty hearing, tinnitus, vertigo; No sinus or throat pain  NECK: No pain or stiffness  BREASTS: No pain, masses, or nipple discharge  RESPIRATORY: No cough, wheezing, chills or hemoptysis; No shortness of breath  CARDIOVASCULAR: No chest pain, palpitations, dizziness, or leg swelling  GASTROINTESTINAL: No abdominal or epigastric pain. No nausea, vomiting, or hematemesis; No diarrhea or constipation. No melena or hematochezia.  GENITOURINARY: No dysuria, frequency, hematuria, or incontinence  NEUROLOGICAL: No headaches, memory loss, loss of strength, numbness, or tremors  SKIN: No itching, burning, rashes, or lesions   LYMPH NODES: No enlarged glands  ENDOCRINE: No heat or cold intolerance; No hair loss  MUSCULOSKELETAL: No joint pain or swelling; No muscle, back, or extremity pain  PSYCHIATRIC: No depression, anxiety, mood swings, or difficulty sleeping  HEME/LYMPH: No easy bruising, or bleeding gums  ALLERY AND IMMUNOLOGIC: No hives or eczema  FAMILY HISTORY:  FH: aortic dissection  mom      T(C): 35.8 (08-03-21 @ 08:25), Max: 36.9 (08-02-21 @ 18:00)  HR: 82 (08-03-21 @ 08:25) (58 - 87)  BP: 147/73 (08-03-21 @ 08:25) (102/65 - 161/94)  RR: 19 (08-03-21 @ 05:00) (15 - 25)  SpO2: 98% (08-02-21 @ 22:00) (95% - 99%)  Wt(kg): --Vital Signs Last 24 Hrs  T(C): 35.8 (03 Aug 2021 08:25), Max: 36.9 (02 Aug 2021 18:00)  T(F): 96.5 (03 Aug 2021 08:25), Max: 98.5 (02 Aug 2021 18:00)  HR: 82 (03 Aug 2021 08:25) (58 - 87)  BP: 147/73 (03 Aug 2021 08:25) (102/65 - 161/94)  BP(mean): 97 (02 Aug 2021 22:00) (77 - 107)  RR: 19 (03 Aug 2021 05:00) (15 - 25)  SpO2: 98% (02 Aug 2021 22:00) (95% - 99%)    PHYSICAL EXAM:  GENERAL: NAD, well-groomed, well-developed  HEAD:  Atraumatic, Normocephalic  EYES: EOMI, PERRLA, conjunctiva and sclera clear  ENMT: No tonsillar erythema, exudates, or enlargement; Moist mucous membranes, Good dentition, No lesions  NECK: Supple, No JVD, Normal thyroid  NERVOUS SYSTEM:  Alert & Oriented X3, Good concentration; Motor Strength 5/5 B/L upper and lower extremities; DTRs 2+ intact and symmetric  PULM: Clear to auscultation bilaterally  CARDIAC: Regular rate and rhythm; No murmurs, rubs, or gallops  GI: Soft, Nontender, Nondistended; Bowel sounds present  EXTREMITIES:  2+ Peripheral Pulses, No clubbing, cyanosis, or edema  LYMPH: No lymphadenopathy noted  SKIN: No rashes or lesions    Consultant(s) Notes Reviewed:  [x ] YES  [ ] NO  Care Discussed with Consultants/Other Providers [ x] YES  [ ] NO    LABS:                            12.1   4.63  )-----------( 136      ( 02 Aug 2021 04:53 )             37.0   08-02    139  |  105  |  7<L>  ----------------------------<  98  4.1   |  28  |  0.7    Ca    8.2<L>      02 Aug 2021 04:53  Mg     2.1     08-02    TPro  5.0<L>  /  Alb  3.0<L>  /  TBili  1.1  /  DBili  x   /  AST  81<H>  /  ALT  116<H>  /  AlkPhos  169<H>  08-02            aspirin enteric coated 81 milliGRAM(s) Oral daily  atorvastatin 40 milliGRAM(s) Oral at bedtime  cefTRIAXone   IVPB 2000 milliGRAM(s) IV Intermittent every 24 hours  chlorhexidine 4% Liquid 1 Application(s) Topical daily  enoxaparin Injectable 40 milliGRAM(s) SubCutaneous at bedtime  ibuprofen  Tablet. 400 milliGRAM(s) Oral every 6 hours PRN  metroNIDAZOLE  IVPB      metroNIDAZOLE  IVPB 500 milliGRAM(s) IV Intermittent every 8 hours  morphine  - Injectable 2 milliGRAM(s) IV Push every 4 hours PRN  ondansetron Injectable 4 milliGRAM(s) IV Push every 6 hours PRN  pantoprazole  Injectable 40 milliGRAM(s) IV Push every 12 hours  senna 2 Tablet(s) Oral at bedtime  ticagrelor 90 milliGRAM(s) Oral every 12 hours      HEALTH ISSUES - PROBLEM Dx:          Case Discussed with House Staff   49 minutes spent on total encounter; more than 50% of the visit was spent counseling and/or coordinating care by the attending physician including reviewing consultant notes, transfer notes   Spectra [phone]

## 2021-08-03 NOTE — DISCHARGE NOTE NURSING/CASE MANAGEMENT/SOCIAL WORK - PATIENT PORTAL LINK FT
You can access the FollowMyHealth Patient Portal offered by [facility] by registering at the following website: [email or web address]. By joining [employer]’s FollowMyHealth portal, you will also be able to view your health information using other applications (apps) compatible with our system.

## 2021-08-03 NOTE — PROGRESS NOTE ADULT - PROVIDER SPECIALTY LIST ADULT
Critical Care
Gastroenterology
Hospitalist
Critical Care
Gastroenterology
Critical Care
Gastroenterology
Infectious Disease
Critical Care
Gastroenterology

## 2021-08-03 NOTE — DISCHARGE NOTE PROVIDER - CARE PROVIDERS DIRECT ADDRESSES
[email or web address]

## 2021-08-03 NOTE — DISCHARGE NOTE PROVIDER - HOSPITAL COURSE
Patient is a 62 y/o M w/ PMH of HTN, CAD s/p PCI (9/25/19, Dr. Mcclure) on aspirin and brilinta (ticagrelor), cholecystitis s/p cholecystectomy by Dr Avalos in 2020 presents with fever, chills and severe epigastric abdominal pain x 1 day and admitted to ICU for management of septic shock secondary to cholangitis. patient was startde on antibiotics and ERCP was doene with sphincterotom, stent placement and pus drainage.       #Septic shock  #Cholangitis s/p ERCP with stent placement  -Keep NPO for today  -Monitor for post-ERCP complications  -Continue IV antibiotics, on ceftriaxone and flagyl per ID  -7/28 Bcx +ve for Klebsiella (non-ESBL)  -Repeat ERCP as outpatient for stent removal and extension of sphincterotomy      #Hgb drop in setting of hematemesis  - repeat CBC shows hgb stable at 12  - no need for scope at this time  - continue to monitor on PM labs  - holding ASA, brilinta, -> continued     Patient is a 62 y/o M w/ PMH of HTN, CAD s/p PCI (9/25/19, Dr. Mcclure) on aspirin and brilinta (ticagrelor), cholecystitis s/p cholecystectomy by Dr Avalos in 2020 presents with fever, chills and severe epigastric abdominal pain x 1 day and admitted to ICU for management of septic shock secondary to cholangitis. patient was started on antibiotics and ERCP was done with sphincterotomy, stent placement and pus drainage. blood culture grew kelebsiella -> patient on ceftriaxone and metronidazole-> d/c PO cipro 500mg BID flagyl 500mg TID to complete 10 days post ERCP        #Septic shock  #Cholangitis s/p ERCP with stent placement  -Keep NPO for today  -Monitor for post-ERCP complications  -Continue IV antibiotics, on ceftriaxone and flagyl per ID  -7/28 Bcx +ve for Klebsiella (non-ESBL)  -Repeat ERCP as outpatient for stent removal and extension of sphincterotomy  - cipro flagyl as outpatient      #Hgb drop in setting of hematemesis  - repeat CBC shows hgb stable at 12  - no need for scope at this time  - continue to monitor on PM labs  - holding ASA, brilinta, -> continued    ` Patient is a 60 y/o M w/ PMH of HTN, CAD s/p PCI (9/25/19, Dr. Mcclure) on aspirin and brilinta (ticagrelor), cholecystitis s/p cholecystectomy by Dr Avalos in 2020 presents with fever, chills and severe epigastric abdominal pain x 1 day and admitted to ICU for management of septic shock secondary to cholangitis. patient was started on antibiotics and ERCP was done with sphincterotomy, stent placement and pus drainage. blood culture grew klebsiella -> patient on ceftriaxone and metronidazole-> d/c PO cipro 500mg BID flagyl 500mg TID to complete 10 days post ERCP        #Septic shock  #Cholangitis s/p ERCP with stent placement  -Continue IV antibiotics, on ceftriaxone and flagyl per ID  -7/28 Bcx +ve for Klebsiella (non-ESBL)  -Repeat ERCP as outpatient for stent removal and extension of sphincterotomy  - cipro flagyl as outpatient      #Hgb drop in setting of hematemesis  - repeat CBC shows hgb stable at 12  - no need for scope at this time  - continue to monitor on PM labs  - holding ASA, brilinta, -> continued    `

## 2021-08-03 NOTE — PROGRESS NOTE ADULT - NSICDXPILOT_GEN_ALL_CORE
[place]

## 2021-08-03 NOTE — PROGRESS NOTE ADULT - SUBJECTIVE AND OBJECTIVE BOX
Patient is a 60 y/o with PMHx of HTN, CAD s/p PCI (9/25/19, Dr. Mcclure) on aspirin and Brilinta cholecystitis s/p cholecystectomy by Dr Avalos in 2020 who presented to [facility] with abdominal pain. Patient met SIRS criteria on presentation and high clinical suspicion remained for cholangitis. Patient s/p ERCP with sphincterotomy, drainage of pus from CBD, and placement of a Biliary stent. Patient overall much improved and since downgraded. Tolerating diet and no overnight events.     PAST MEDICAL & SURGICAL HISTORY:  CAD requring PCI with stents   HTN (hypertension)  CCY      MEDICATIONS  (STANDING):  atorvastatin 80 milliGRAM(s) Oral at bedtime  cefepime   IVPB 2000 milliGRAM(s) IV Intermittent every 8 hours  lactated ringers Bolus 1000 milliLiter(s) IV Bolus once  lactated ringers. 1000 milliLiter(s) (150 mL/Hr) IV Continuous <Continuous>  metroNIDAZOLE  IVPB      metroNIDAZOLE  IVPB 500 milliGRAM(s) IV Intermittent once  metroNIDAZOLE  IVPB 500 milliGRAM(s) IV Intermittent every 8 hours  norepinephrine Infusion 0.05 MICROgram(s)/kG/Min (9.36 mL/Hr) IV Continuous <Continuous>  senna 2 Tablet(s) Oral at bedtime    MEDICATIONS  (PRN):  morphine  - Injectable 2 milliGRAM(s) IV Push every 4 hours PRN Moderate Pain (4 - 6)  ondansetron Injectable 4 milliGRAM(s) IV Push every 6 hours PRN Nausea and/or Vomiting      Allergies  No Known Allergies      Review of Systems  General:  See HPI  HEENT: Denies Trouble Swallowing ,Denies  Sore Throat , Denies Change in hearing/vision/speech ,Denies Dizziness    Cardio: Denies  Chest Pain , Palpitations    Respiratory: Denies worsening of SOB, Denies Cough  Abdomen: See detailed HPI  Neuro: Denies Headache Denies Dizziness, Denies Paresthesias  MSK: Denies pain in Bones/Joints/Muscles   Psych: Patient denies depression, denies suicidal or homicidal ideations  Integ: Patient Denies rash, or new skin lesions       Vital Signs   T(F): 96.5 (03 Aug 2021 08:25), Max: 98.5 (02 Aug 2021 18:00)  HR: 82 (03 Aug 2021 08:25) (68 - 87)  BP: 147/73 (03 Aug 2021 08:25) (120/72 - 161/94)  BP(mean): 97 (02 Aug 2021 22:00) (88 - 107)  RR: 19 (03 Aug 2021 05:00) (15 - 25)  SpO2: 98% (02 Aug 2021 22:00) (95% - 99%)  Physical Exam  Gen: NAD, diaphoretic   HEENT: NC/AT, Mucosal Membranes Moist  Cardio: S1/S2 No S3/S4  Resp: CTA B/L  Abdomen: Soft, ND/NT  Neuro: AAOx3  Extremities: FROM x 4      LABS:                        12.1   4.63  )-----------( 136      ( 02 Aug 2021 04:53 )             37.0     08-02    139  |  105  |  7<L>  ----------------------------<  98  4.1   |  28  |  0.7    Ca    8.2<L>      02 Aug 2021 04:53  Mg     2.1     08-02    TPro  5.0<L>  /  Alb  3.0<L>  /  TBili  1.1  /  DBili  x   /  AST  81<H>  /  ALT  116<H>  /  AlkPhos  169<H>  08-02      RADIOLOGY & ADDITIONAL STUDIES:  US Abdomen Upper Quadrant Right 07.29.21   IMPRESSION:    Postcholecystectomy without evidence of CBD dilatation.  CBD 5mm    CT of Abdomen   IMPRESSION:  Mild inflammatory changes about the nondilated CBD may reflect evidence of cholangitis.    Otherwise, no evidence of acute thoracic, abdominal or pelvic pathology.    Stable pulmonary nodules.

## 2021-08-03 NOTE — PROGRESS NOTE ADULT - ASSESSMENT
HPI:  patient is a 62 y/o M w/ PMH of HTN, CAD s/p PCI (9/25/19, Dr. Mcclure) on aspirin and brilinta, cholecystitis s/p cholecystectomy by Dr Avalos in 2020 presents with fever, chills and severe epigastric abdominal pain x 1 day. per patient developed severe epigastric abdominal pain yesterday morning (7/28/2021), he went to our [facility] for evaluation, he was told he has gas around his pancrease and was discharged home in the afternoon. Then later in the evening around 9pm pt developed shaking chills with low fever with persistent epigastric pain, associated with nausea and non bilious vomiting x 2 so he came in to the hospital for further evaluation.       #Septic shock requiring pressors 2/2 Klebsiella bacteremia d/t Cholangitis - s/p ERCP with stent - improving  dc home on cipro and flagyl for 10 more days  - ASA was started and ok to start Brilinta   - Will need removal of stent within 4-6 weeks [phone]     #Acute blood loss anemia secondary to hematemesis   sp egd-  no further bleeding  hemoglobin stable    #h/o CAD s/p PCI in 2019  aspirin , statin , and brillinta     #Overweight BMI 27 patient needs to see die[place] outpatient for further evaluation     PROGRESS NOTE HANDOFF    Pending:  dc home today     Family discussion: patient verbalized understanding and agreeable to plan of care     Disposition: home

## 2021-08-03 NOTE — DISCHARGE NOTE PROVIDER - NSDCCPCAREPLAN_GEN_ALL_CORE_FT
PRINCIPAL DISCHARGE DIAGNOSIS  Diagnosis: Cholangitis  Assessment and Plan of Treatment: you presented with fever adepigastric pain. you had disturbed liver enzymes on your labs. ct abd/pelv showed cbd inflamation but no dilation. you were hypotensive -> transferred toICU where you received iv bolus and levophed. your vitals improved, ERCP done and stent was placed. you were transfered back to floor. IV antibiotics were started (ceftriaxon and metronidazol) and you will continue oral antibiotics flagyl 500 mg 1 tab 3 times daily and ciprofloxacin 500 mg twice daily for 10 days after your ERCP. you should follow up with your gastroenterologist for repeat ERCP and removal of stent      SECONDARY DISCHARGE DIAGNOSES  Diagnosis: Sepsis  Assessment and Plan of Treatment: you presented with fever adepigastric pain. you had disturbed liver enzymes on your labs. ct abd/pelv showed cbd inflamation but no dilation. you were hypotensive -> transferred toICU where you received iv bolus and levophed. your vitals improved, ERCP done and stent was placed. you were transfered back to floor    Diagnosis: Bacteremia due to Klebsiella pneumoniae  Assessment and Plan of Treatment: you presented with fever adepigastric pain. you had disturbed liver enzymes on your labs. ct abd/pelv showed cbd inflamation but no dilation. you were hypotensive -> transferred toICU where you received iv bolus and levophed. your vitals improved. blood cultures grew klebsiella which is a gram nehative bacteria -> IV antibiotics were started (ceftriaxon and metronidazol) and you will continue oral antibiotics flagyl 500 mg 1 tab 3 times daily and ciprofloxacin 500 mg twice daily for 10 days after your ERCP    Diagnosis: Hematemesis  Assessment and Plan of Treatment: you had an episode og=f ematemesis associated with hemoglobin drop. aspirin and brilinta were stopped and then reintroduced. you should continue your meds and follow with your pcp and cardiologist

## 2021-08-03 NOTE — PROGRESS NOTE ADULT - ASSESSMENT
Patient is a 60 y/o with PMHx of HTN, CAD s/p PCI (9/25/19, Dr. Mcclure) on aspirin and Brilinta cholecystitis s/p cholecystectomy by Dr Avalos in 2020 who presented to [facility] with abdominal pain. Patient met SIRS criteria on presentation and high clinical suspicion remained for cholangitis. Patient s/p ERCP with sphincterotomy, drainage of pus from CBD, and placement of a Biliary stent. Patient overall much improved and since downgraded. Tolerating diet and no overnight events. Patient being discharged today from medicine team.       Abdominal Pain/ Cholangitis  - Downgraded doing much better   - Appreciate ID consult and ABX adjusted for Gram Negative bacteremia - please follow ID recommendations for course upon discharge   - ERCP done 7/29 with sphincterotomy, drainage of pus, and placement of plastic cbd stent   - Continue low fat diet  - Pantoprazole 40mg QD needs to be sent for patient upon discharge to continue for 1 month  - Will need removal of stent within 4-6 weeks [phone] - Office will reach out to patient for appointment  - Recall as needed

## 2021-08-03 NOTE — PROGRESS NOTE ADULT - REASON FOR ADMISSION
cholangitis

## 2021-08-03 NOTE — DISCHARGE NOTE PROVIDER - CARE PROVIDER_API CALL
Jesus Morales)  [id number]  [address] [place] [address]  Phone: [phone]  Fax: [phone]  Established Patient  Follow Up Time:     Ron Garber)  Gastroenterology; Internal Medicine  [address] [place] [address]  Phone: [phone]  Fax: [phone]  Follow Up Time:

## 2021-08-03 NOTE — CHART NOTE - NSCHARTNOTEFT_GEN_A_CORE
patient is seen and examined  patient is hemodynamically stable  no complaints  he can be discharged

## 2021-08-03 NOTE — DISCHARGE NOTE PROVIDER - NSDCMRMEDTOKEN_GEN_ALL_CORE_FT
acetaminophen 325 mg oral tablet: 2 tab(s) orally every 6 hours  Aspirin Enteric Coated 81 mg oral delayed release tablet: 1 tab(s) orally once a day MDD:1  atorvastatin 80 mg oral tablet: 1 tab(s) orally once a day (at bedtime)  Brilinta (ticagrelor) 90 mg oral tablet: 1 tab(s) orally 2 times a day MDD:2  metoprolol succinate 25 mg oral tablet, extended release: 1 tab(s) orally once a day  oxyCODONE 5 mg oral tablet: 1 tab(s) orally every 4 hours MDD:6  senna oral tablet: 2 tab(s) orally once a day MDD:2   Aspirin Enteric Coated 81 mg oral delayed release tablet: 1 tab(s) orally once a day MDD:1  atorvastatin 80 mg oral tablet: 1 tab(s) orally once a day (at bedtime)  Brilinta (ticagrelor) 90 mg oral tablet: 1 tab(s) orally 2 times a day MDD:2  metoprolol succinate 25 mg oral tablet, extended release: 1 tab(s) orally once a day  oxyCODONE 5 mg oral tablet: 1 tab(s) orally every 4 hours MDD:6  senna oral tablet: 2 tab(s) orally once a day MDD:2   Aspirin Enteric Coated 81 mg oral delayed release tablet: 1 tab(s) orally once a day MDD:1  atorvastatin 80 mg oral tablet: 1 tab(s) orally once a day (at bedtime)  Brilinta (ticagrelor) 90 mg oral tablet: 1 tab(s) orally 2 times a day MDD:2  Cipro 500 mg oral tablet: 1 tab(s) orally 2 times a day   Flagyl 500 mg oral tablet: 1 tab(s) orally 3 times a day  metoprolol succinate 25 mg oral tablet, extended release: 1 tab(s) orally once a day  oxyCODONE 5 mg oral tablet: 1 tab(s) orally every 4 hours MDD:6  Protonix 40 mg oral delayed release tablet: 1 tab(s) orally 2 times a day   senna oral tablet: 2 tab(s) orally once a day MDD:2

## 2021-08-04 LAB
CULTURE RESULTS: SIGNIFICANT CHANGE UP
SPECIMEN SOURCE: SIGNIFICANT CHANGE UP

## 2021-08-06 DIAGNOSIS — R91.8 OTHER NONSPECIFIC ABNORMAL FINDING OF LUNG FIELD: ICD-10-CM

## 2021-08-06 DIAGNOSIS — I25.10 ATHEROSCLEROTIC HEART DISEASE OF NATIVE CORONARY ARTERY WITHOUT ANGINA PECTORIS: ICD-10-CM

## 2021-08-06 DIAGNOSIS — Z79.82 LONG TERM (CURRENT) USE OF ASPIRIN: ICD-10-CM

## 2021-08-06 DIAGNOSIS — D62 ACUTE POSTHEMORRHAGIC ANEMIA: ICD-10-CM

## 2021-08-06 DIAGNOSIS — I10 ESSENTIAL (PRIMARY) HYPERTENSION: ICD-10-CM

## 2021-08-06 DIAGNOSIS — K92.0 HEMATEMESIS: ICD-10-CM

## 2021-08-06 DIAGNOSIS — A41.59 OTHER GRAM-NEGATIVE SEPSIS: ICD-10-CM

## 2021-08-06 DIAGNOSIS — E66.3 OVERWEIGHT: ICD-10-CM

## 2021-08-06 DIAGNOSIS — Z95.5 PRESENCE OF CORONARY ANGIOPLASTY IMPLANT AND GRAFT: ICD-10-CM

## 2021-08-06 DIAGNOSIS — R65.21 SEVERE SEPSIS WITH SEPTIC SHOCK: ICD-10-CM

## 2021-08-06 DIAGNOSIS — E87.2 ACIDOSIS: ICD-10-CM

## 2021-08-06 DIAGNOSIS — K83.09 OTHER CHOLANGITIS: ICD-10-CM

## 2021-08-11 ENCOUNTER — APPOINTMENT (OUTPATIENT)
Age: 61
End: 2021-08-11
Payer: COMMERCIAL

## 2021-08-11 PROCEDURE — 99443: CPT

## 2021-08-11 NOTE — HISTORY OF PRESENT ILLNESS
[Home] : at home, [unfilled] , at the time of the visit. [Medical Office: ([facility])___] : at the medical office located in  [Verbal consent obtained from patient] : the patient, [unfilled] [[id number]] : l [de-identified] : Patient is a 62 y/o with PMHx of HTN, CAD s/p PCI (9/25/19, Dr. Mcclure) on aspirin and Brilinta cholecystitis s/p cholecystectomy by Dr Avalos in 2020 who presented to [facility] with abdominal pain. Patient met SIRS criteria on presentation and high clinical suspicion remained for cholangitis. Patient s/p ERCP with sphincterotomy, drainage of pus from CBD, and placement of a Biliary stent on 7/29. Patient feels much better. \par \par

## 2021-08-11 NOTE — ASSESSMENT
[FreeTextEntry1] : Patient is a 60 y/o with PMHx of HTN, CAD s/p PCI (9/25/19, Dr. Mcclure) on aspirin and Brilinta cholecystitis s/p cholecystectomy by Dr Avalos in 2020 who presented to [facility] with abdominal pain. Patient met SIRS criteria on presentation and high clinical suspicion remained for cholangitis. Patient s/p ERCP with sphincterotomy, drainage of pus from CBD, and placement of a Biliary stent on 7/29. Patient feels much better. Patient needs repeat ERCP. Hold Brillinta for 5 days prior. \par \par \par Cholangitis requiring ERCP and stent placement\par - Feeling better since discharge \par - Setup for ERCP with stent removal- Hold Brillinta for 5 days prior \par \par \par

## 2021-08-27 ENCOUNTER — OUTPATIENT (OUTPATIENT)
Dept: OUTPATIENT SERVICES | Facility: HOSPITAL | Age: 61
LOS: 1 days | Discharge: HOME | End: 2021-08-27

## 2021-08-27 DIAGNOSIS — Z11.59 ENCOUNTER FOR SCREENING FOR OTHER VIRAL DISEASES: ICD-10-CM

## 2021-08-30 ENCOUNTER — OUTPATIENT (OUTPATIENT)
Dept: OUTPATIENT SERVICES | Facility: HOSPITAL | Age: 61
LOS: 1 days | Discharge: HOME | End: 2021-08-30
Payer: COMMERCIAL

## 2021-08-30 ENCOUNTER — TRANSCRIPTION ENCOUNTER (OUTPATIENT)
Age: 61
End: 2021-08-30

## 2021-08-30 VITALS
HEART RATE: 77 BPM | HEIGHT: 73 IN | RESPIRATION RATE: 18 BRPM | TEMPERATURE: 98 F | WEIGHT: 214.95 LBS | SYSTOLIC BLOOD PRESSURE: 140 MMHG | DIASTOLIC BLOOD PRESSURE: 86 MMHG

## 2021-08-30 VITALS
HEART RATE: 82 BPM | DIASTOLIC BLOOD PRESSURE: 82 MMHG | RESPIRATION RATE: 18 BRPM | SYSTOLIC BLOOD PRESSURE: 145 MMHG | OXYGEN SATURATION: 97 %

## 2021-08-30 DIAGNOSIS — Z90.49 ACQUIRED ABSENCE OF OTHER SPECIFIED PARTS OF DIGESTIVE TRACT: Chronic | ICD-10-CM

## 2021-08-30 PROCEDURE — 43264 ERCP REMOVE DUCT CALCULI: CPT | Mod: XU

## 2021-08-30 PROCEDURE — 43262 ENDO CHOLANGIOPANCREATOGRAPH: CPT | Mod: XU

## 2021-08-30 PROCEDURE — 43275 ERCP REMOVE FORGN BODY DUCT: CPT

## 2021-08-30 NOTE — ASU DISCHARGE PLAN (ADULT/PEDIATRIC) - CARE PROVIDER_API CALL
Ron Garber)  Gastroenterology; Internal Medicine  [address] [place] [address]  Phone: [phone]  Fax: [phone]  Follow Up Time: 2 weeks

## 2021-08-30 NOTE — H&P PST ADULT - HISTORY OF PRESENT ILLNESS
patient is a 62 y/o M w/ PMH of HTN, CAD s/p PCI (9/25/19, Dr. Mcclure) on aspirin and brilinta, cholecystitis s/p cholecystectomy by Dr Avalos in 2020 with recent cholangitis  s/p small biliary sphincterotomy and s/p 8.5 Fr x 7 cm plastic biliary stent placement is here for stent removal

## 2021-08-30 NOTE — ASU DISCHARGE PLAN (ADULT/PEDIATRIC) - CALL YOUR DOCTOR IF YOU HAVE ANY OF THE FOLLOWING:
Bleeding that does not stop/Swelling that gets worse/Pain not relieved by Medications/Fever greater than (need to indicate Fahrenheit or Celsius)/Nausea and vomiting that does not stop/Inability to tolerate liquids or foods/Increased irritability or sluggishness

## 2021-08-30 NOTE — ASU PATIENT PROFILE, ADULT - NSICDXPASTMEDICALHX_GEN_ALL_CORE_FT
PAST MEDICAL HISTORY:  CAD in native artery stents 9/2019    HTN (hypertension)     Hypercholesterolemia

## 2021-08-30 NOTE — PRE-ANESTHESIA EVALUATION ADULT - NSANTHOSAYNRD_GEN_A_CORE
denies/No. JUSTIN screening performed.  STOP BANG Legend: 0-2 = LOW Risk; 3-4 = INTERMEDIATE Risk; 5-8 = HIGH Risk

## 2021-08-30 NOTE — H&P PST ADULT - ASSESSMENT
A: hx of cholangitis s/p ERCP with biliary stent placement is here for stent removal    Plan  ERCP with stent removal

## 2021-08-30 NOTE — CHART NOTE - NSCHARTNOTEFT_GEN_A_CORE
PACU ANESTHESIA ADMISSION NOTE      Procedure:   Post op diagnosis:      ____  Intubated  TV:______       Rate: ______      FiO2: ______    __x__  Patent Airway    __x__  Full return of protective reflexes    ____  Full recovery from anesthesia / back to baseline     Vitals:   T:   97.7        R:   14               BP:   146/71               Sat:    95%               P: 81      Mental Status:  __x__ Awake   ____ Alert   _x____ Drowsy   _____ Sedated    Nausea/Vomiting:  __x__ NO  ______Yes,   See Post - Op Orders          Pain Scale (0-10):  __0___    Treatment: ____ None    ___x_ See Post - Op/PCA Orders    Post - Operative Fluids:   ____ Oral   __x__ See Post - Op Orders    Plan: Discharge:   __x__Home       _____Floor     _____Critical Care    _____  Other:_________________    Comments:  pt tolerated procedure well, pt transferred to PACU and report was given to PACU RN, vital signs are stable and pt shows no signs of distress. no anesthesia complications, discharge pt when criteria met.

## 2021-09-03 DIAGNOSIS — I25.10 ATHEROSCLEROTIC HEART DISEASE OF NATIVE CORONARY ARTERY WITHOUT ANGINA PECTORIS: ICD-10-CM

## 2021-09-03 DIAGNOSIS — K83.09 OTHER CHOLANGITIS: ICD-10-CM

## 2021-09-03 DIAGNOSIS — I10 ESSENTIAL (PRIMARY) HYPERTENSION: ICD-10-CM

## 2021-09-03 DIAGNOSIS — Z79.82 LONG TERM (CURRENT) USE OF ASPIRIN: ICD-10-CM

## 2021-09-03 DIAGNOSIS — E78.00 PURE HYPERCHOLESTEROLEMIA, UNSPECIFIED: ICD-10-CM

## 2021-09-03 DIAGNOSIS — K44.9 DIAPHRAGMATIC HERNIA WITHOUT OBSTRUCTION OR GANGRENE: ICD-10-CM

## 2021-09-07 PROBLEM — E78.00 PURE HYPERCHOLESTEROLEMIA, UNSPECIFIED: Chronic | Status: ACTIVE | Noted: 2021-08-30

## 2021-09-22 ENCOUNTER — APPOINTMENT (OUTPATIENT)
Dept: GASTROENTEROLOGY | Facility: CLINIC | Age: 61
End: 2021-09-22
Payer: COMMERCIAL

## 2021-09-22 DIAGNOSIS — K57.30 DIVERTICULOSIS OF LARGE INTESTINE W/OUT PERFORATION OR ABSCESS W/OUT BLEEDING: ICD-10-CM

## 2021-09-22 DIAGNOSIS — K83.09 OTHER CHOLANGITIS: ICD-10-CM

## 2021-09-22 PROCEDURE — 99442: CPT

## 2021-09-22 NOTE — ASSESSMENT
[FreeTextEntry1] : Patient is a 62 y/o with PMHx of HTN, CAD s/p PCI (9/25/19, Dr. Mcclure) on aspirin and Brilinta cholecystitis s/p cholecystectomy by Dr Avalos in 2020 who presented to [facility] with abdominal pain. Patient met SIRS criteria on presentation and high clinical suspicion remained for cholangitis. Patient s/p ERCP with sphincterotomy, drainage of pus from CBD, and placement of a Biliary stent on 7/29.  Patient then had additional ERCP for removal of stent which went well.  Patient currently feels well without any abdominal pain.\par \par \par Cholangitis requiring ERCP and stent placement/ ERCP done and stent remove \par - Feeling better since discharge \par - ERCP discussed with patient\par \par Colonic diverticula/ Colon Cancer Screening\par - HOLD BRILLINTA 5 days before\par - Risk and benefit explained in detail to the patient\par - Clear liquid diet the day before the procedure\par - Finish all of the prescribed prep as ordered to insure good visualization\par - Please refrain from any NSAID use including ASA one week before\par - Follow up 3-4 weeks after the procedure stressed to insure follow up on pathology and planning for future screenings \par \par

## 2021-09-22 NOTE — HISTORY OF PRESENT ILLNESS
[Home] : at home, [unfilled] , at the time of the visit. [Verbal consent obtained from patient] : the patient, [unfilled] [___ Month(s) Ago] : [unfilled] month(s) ago [FreeTextEntry4] : Nicolle [de-identified] : Patient is a 62 y/o with PMHx of HTN, CAD s/p PCI (9/25/19, Dr. Mcclure) on aspirin and Brilinta cholecystitis s/p cholecystectomy by Dr Avalos in 2020 who presented to [facility] with abdominal pain. Patient met SIRS criteria on presentation and high clinical suspicion remained for cholangitis. Patient s/p ERCP with sphincterotomy, drainage of pus from CBD, and placement of a Biliary stent on 7/29.  Patient then had additional ERCP for removal of stent which went well.  Patient currently feels well without any abdominal pain.\par \par

## 2021-10-19 NOTE — PROGRESS NOTE ADULT - ASSESSMENT
Nutrition Care Plan    Nutrition Diagnosis:   Altered GI function  related to history of short bowel syndrome as evidenced by s/p ileostomy and colectomy and previous report of 5 small meals/day as usual eating pattern  ** Wound consult received. Per wound care note no open wound on buttocks, perineal fungal dermatitis noted. Current wounds do not significantly increase nutrient needs.     Intervention:  General/healthful diet:   On consistent Carbohydrate Moderate, 2 gm sodium diet   -- Encourage nutritionally balanced meals    -- Patient aware of high protein food sources and importance for strengthening    Monitoring and Evaluation:  Amount of food:  Goal for patient to consume and tolerate >/= 75% of meals - goal met, consuming 100% of meals       58 yo M with no significant PMH comes to the ED for the chest pain x 3 months. The pain is non exertional, tearing in nature starts in the back and radiates to the front and in the left arm. He recently had CCTA in July that was normal. EGD was also done recently that is normal. 60 yo M with no significant PMH comes to the ED for the chest pain x 3 months. The pain is non exertional, tearing in nature starts in the back and radiates to the front and in the left arm. He recently had CCTA in July that was normal. EGD was also done recently that is normal.  Cholecystitis ruled out by surgery team. Taken to cath on 09/25 found to have Mid Cx 95% diffuse lesion  PCI done with Synergy Stent Placement.       IMPRESSION  Unstable Angina found to have Mid Cx 95% diffuse lesion stent placed   DLD  Likely Not DM Hba1c 11 possible lab error FS within normal range will repeat     PLAN:     CNS:no sedation     HEENT: Oral care    PULMONARY:  HOB @ 45 degrees    CARDIOVASCULAR: DAPT(asa and Brilinta) , B-blocker & Statin therapy, ECG post cath verified, OP Cardio follow up Down grade to tele  floor  tomorrow.     GI: GI prophylaxis.  Feeding     RENAL:  Follow up lytes.  Correct as needed    INFECTIOUS DISEASE: No infective process     HEMATOLOGICAL:  DVT prophylaxis.    ENDOCRINE: hba1c 11 Follow up FS.  Insulin protocol if needed. FS within normal range     MUSCULOSKELETAL: Increase as tolerated       Electrolyte Imbalances: Correct as needed  []  Hyponatremia   /   Hypernatremia  []   []  Hypokalemia   /   Hyperkalemia  []   []  Hypochlorhydria   /    Hypochlorhydria  []   []  Hypomagnesemia   /   Hypermagnesemia  []   []  Hypophosphatemia   /   Hyperphosphatemia  []       GI ppx:                                   [x] Not indicated   /   [] Pantoprazole 40mg PO Daily    DVT ppx: on DAPT   [] Not indicated / [x] Heparin 5000mg SubQ / [] Lovenox 40mg SubQ / [x] SCDs    Fluids:   [x] PO  |  [] IVF    Activity:  [X] Assisatnce needed   [X] Increase as Tolerated  /  [] OOB w/ assist  /  [] Bed Rest    BMI:  Height (cm): 185.4 (09-24)  Weight (kg): 101.3 (09-24)  BMI (kg/m2): 29.5 (09-24)        DISPO:  Patient to be discharged when condition(s) optimized.  [x ] From Home     [ ] NH/SNF   [ ] 4A Rehab  [ ] Detox Clinic  [X] Plan Discussion with patient and/or family.  [X] Discussed Case and Plan with the Medical Attending.    CODE STATUS  [X] FULL   /    [] DNR

## 2022-02-11 ENCOUNTER — OUTPATIENT (OUTPATIENT)
Dept: OUTPATIENT SERVICES | Facility: HOSPITAL | Age: 62
LOS: 1 days | Discharge: HOME | End: 2022-02-11

## 2022-02-11 DIAGNOSIS — Z90.49 ACQUIRED ABSENCE OF OTHER SPECIFIED PARTS OF DIGESTIVE TRACT: Chronic | ICD-10-CM

## 2022-02-11 DIAGNOSIS — R07.9 CHEST PAIN, UNSPECIFIED: ICD-10-CM

## 2022-02-17 ENCOUNTER — APPOINTMENT (OUTPATIENT)
Dept: GASTROENTEROLOGY | Facility: CLINIC | Age: 62
End: 2022-02-17

## 2022-02-22 ENCOUNTER — LABORATORY RESULT (OUTPATIENT)
Age: 62
End: 2022-02-22

## 2022-02-25 ENCOUNTER — RESULT REVIEW (OUTPATIENT)
Age: 62
End: 2022-02-25

## 2022-02-25 ENCOUNTER — TRANSCRIPTION ENCOUNTER (OUTPATIENT)
Age: 62
End: 2022-02-25

## 2022-02-25 ENCOUNTER — OUTPATIENT (OUTPATIENT)
Dept: OUTPATIENT SERVICES | Facility: HOSPITAL | Age: 62
LOS: 1 days | Discharge: HOME | End: 2022-02-25
Payer: COMMERCIAL

## 2022-02-25 VITALS
SYSTOLIC BLOOD PRESSURE: 99 MMHG | DIASTOLIC BLOOD PRESSURE: 62 MMHG | OXYGEN SATURATION: 97 % | RESPIRATION RATE: 20 BRPM | HEART RATE: 78 BPM

## 2022-02-25 VITALS
WEIGHT: 220.02 LBS | TEMPERATURE: 97 F | HEIGHT: 73 IN | SYSTOLIC BLOOD PRESSURE: 127 MMHG | HEART RATE: 97 BPM | RESPIRATION RATE: 18 BRPM | DIASTOLIC BLOOD PRESSURE: 60 MMHG

## 2022-02-25 DIAGNOSIS — Z90.49 ACQUIRED ABSENCE OF OTHER SPECIFIED PARTS OF DIGESTIVE TRACT: Chronic | ICD-10-CM

## 2022-02-25 PROCEDURE — 43239 EGD BIOPSY SINGLE/MULTIPLE: CPT | Mod: XS

## 2022-02-25 PROCEDURE — 88305 TISSUE EXAM BY PATHOLOGIST: CPT | Mod: 26

## 2022-02-25 PROCEDURE — 88312 SPECIAL STAINS GROUP 1: CPT | Mod: 26

## 2022-02-25 PROCEDURE — 45380 COLONOSCOPY AND BIOPSY: CPT

## 2022-02-25 RX ORDER — SIMETHICONE 80 MG/1
1 TABLET, CHEWABLE ORAL
Qty: 0 | Refills: 0 | DISCHARGE

## 2022-02-25 NOTE — CHART NOTE - NSCHARTNOTEFT_GEN_A_CORE
PACU ANESTHESIA ADMISSION NOTE      Procedure:   Post op diagnosis:      ____  Intubated  TV:______       Rate: ______      FiO2: ______    __x__  Patent Airway    __x__  Full return of protective reflexes    __x__  Full recovery from anesthesia / back to baseline     Vitals:   See Anesthesia record  T- 97.9 P-88 R-17 B/P- 96/58 SPO2- 96% on RA    Mental Status:  __x__ Awake   __x___ Alert   _____ Drowsy   _____ Sedated    Nausea/Vomiting:  __x__ NO  ______Yes,   See Post - Op Orders          Pain Scale (0-10):  ___0__    Treatment: ____ None    ____ See Post - Op/PCA Orders    Post - Operative Fluids:   ____ Oral   __x__ See Post - Op Orders    Plan: Discharge:   __x__Home       _____Floor     _____Critical Care    _____  Other:_________________    Comments: No anesthesia complications/issues noted. Discharge to HOME when PACU criteria met.

## 2022-02-25 NOTE — ASU DISCHARGE PLAN (ADULT/PEDIATRIC) - NS MD DC FALL RISK RISK
For information on Fall & Injury Prevention, visit: [email or web address] OR  [email or web address] OR  https://www.cdc.gov/steadi/patient.html

## 2022-02-25 NOTE — ASU PATIENT PROFILE, ADULT - FALL HARM RISK - UNIVERSAL INTERVENTIONS
Bed in lowest position, wheels locked, appropriate side rails in place/Call bell, personal items and telephone in reach/Instruct patient to call for assistance before getting out of bed or chair/Non-slip footwear when patient is out of bed/[place] to call system/Physically safe environment - no spills, clutter or unnecessary equipment/Purposeful Proactive Rounding/Room/bathroom lighting operational, light cord in reach

## 2022-02-25 NOTE — ASU PATIENT PROFILE, ADULT - IS PATIENT PREGNANT?
----- Message from Lisa Gordillo MD sent at 10/15/2021 10:18 AM CDT -----  Spoke to daughter Kelli about mild microalbuminuria. Although it is mild, given patient's blood pressure at last visit I think he would tolerate a higher lisinopril dose for renal protection. Will increase to 20 mg daily. Patient to take two of his existing 10 mg pills daily. Kelli agrees and she will update her father.    Patient will need follow up BP check in 2-4 weeks.    Lisa Gordillo MD     not applicable (Male)

## 2022-02-28 ENCOUNTER — OUTPATIENT (OUTPATIENT)
Dept: OUTPATIENT SERVICES | Facility: HOSPITAL | Age: 62
LOS: 1 days | Discharge: HOME | End: 2022-02-28
Payer: COMMERCIAL

## 2022-02-28 DIAGNOSIS — R07.9 CHEST PAIN, UNSPECIFIED: ICD-10-CM

## 2022-02-28 DIAGNOSIS — Z90.49 ACQUIRED ABSENCE OF OTHER SPECIFIED PARTS OF DIGESTIVE TRACT: Chronic | ICD-10-CM

## 2022-02-28 LAB
SURGICAL PATHOLOGY STUDY: SIGNIFICANT CHANGE UP
SURGICAL PATHOLOGY STUDY: SIGNIFICANT CHANGE UP

## 2022-02-28 PROCEDURE — 75574 CT ANGIO HRT W/3D IMAGE: CPT | Mod: 26

## 2022-03-03 DIAGNOSIS — K29.50 UNSPECIFIED CHRONIC GASTRITIS WITHOUT BLEEDING: ICD-10-CM

## 2022-03-03 DIAGNOSIS — I10 ESSENTIAL (PRIMARY) HYPERTENSION: ICD-10-CM

## 2022-03-03 DIAGNOSIS — B96.81 HELICOBACTER PYLORI [H. PYLORI] AS THE CAUSE OF DISEASES CLASSIFIED ELSEWHERE: ICD-10-CM

## 2022-03-03 DIAGNOSIS — K64.8 OTHER HEMORRHOIDS: ICD-10-CM

## 2022-03-03 DIAGNOSIS — K57.30 DIVERTICULOSIS OF LARGE INTESTINE WITHOUT PERFORATION OR ABSCESS WITHOUT BLEEDING: ICD-10-CM

## 2022-03-03 DIAGNOSIS — Z12.11 ENCOUNTER FOR SCREENING FOR MALIGNANT NEOPLASM OF COLON: ICD-10-CM

## 2022-03-03 DIAGNOSIS — K29.80 DUODENITIS WITHOUT BLEEDING: ICD-10-CM

## 2022-03-03 DIAGNOSIS — K63.5 POLYP OF COLON: ICD-10-CM

## 2022-03-03 DIAGNOSIS — E78.00 PURE HYPERCHOLESTEROLEMIA, UNSPECIFIED: ICD-10-CM

## 2022-03-03 DIAGNOSIS — R10.13 EPIGASTRIC PAIN: ICD-10-CM

## 2022-03-03 DIAGNOSIS — Z79.82 LONG TERM (CURRENT) USE OF ASPIRIN: ICD-10-CM

## 2022-03-03 DIAGNOSIS — K31.A0 GASTRIC INTESTINAL METAPLASIA, UNSPECIFIED: ICD-10-CM

## 2022-03-03 DIAGNOSIS — I25.10 ATHEROSCLEROTIC HEART DISEASE OF NATIVE CORONARY ARTERY WITHOUT ANGINA PECTORIS: ICD-10-CM

## 2022-03-03 DIAGNOSIS — Z90.49 ACQUIRED ABSENCE OF OTHER SPECIFIED PARTS OF DIGESTIVE TRACT: ICD-10-CM

## 2022-03-04 DIAGNOSIS — Z95.5 PRESENCE OF CORONARY ANGIOPLASTY IMPLANT AND GRAFT: ICD-10-CM

## 2022-03-04 DIAGNOSIS — K26.9 DUODENAL ULCER, UNSPECIFIED AS ACUTE OR CHRONIC, WITHOUT HEMORRHAGE OR PERFORATION: ICD-10-CM

## 2022-03-09 ENCOUNTER — APPOINTMENT (OUTPATIENT)
Dept: GASTROENTEROLOGY | Facility: CLINIC | Age: 62
End: 2022-03-09
Payer: COMMERCIAL

## 2022-03-09 DIAGNOSIS — K27.9 PEPTIC ULCER, SITE UNSPECIFIED, UNSPECIFIED AS ACUTE OR CHRONIC, W/OUT HEMORRHAGE OR PERFORATION: ICD-10-CM

## 2022-03-09 DIAGNOSIS — B96.81 PEPTIC ULCER, SITE UNSPECIFIED, UNSPECIFIED AS ACUTE OR CHRONIC, W/OUT HEMORRHAGE OR PERFORATION: ICD-10-CM

## 2022-03-09 DIAGNOSIS — K63.5 POLYP OF COLON: ICD-10-CM

## 2022-03-09 PROCEDURE — 99442: CPT

## 2022-03-09 RX ORDER — PREDNISONE 20 MG/1
20 TABLET ORAL
Qty: 10 | Refills: 0 | Status: DISCONTINUED | COMMUNITY
Start: 2020-03-16 | End: 2022-03-09

## 2022-03-09 RX ORDER — SODIUM SULFATE, POTASSIUM SULFATE, MAGNESIUM SULFATE 17.5; 3.13; 1.6 G/ML; G/ML; G/ML
17.5-3.13-1.6 SOLUTION, CONCENTRATE ORAL
Qty: 1 | Refills: 0 | Status: DISCONTINUED | COMMUNITY
Start: 2021-09-22 | End: 2022-03-09

## 2022-03-09 RX ORDER — AMOXICILLIN 500 MG/1
500 CAPSULE ORAL
Qty: 25 | Refills: 0 | Status: DISCONTINUED | COMMUNITY
Start: 2020-03-03 | End: 2022-03-09

## 2022-03-09 RX ORDER — OXYCODONE AND ACETAMINOPHEN 5; 325 MG/1; MG/1
5-325 TABLET ORAL EVERY 6 HOURS
Qty: 20 | Refills: 0 | Status: DISCONTINUED | COMMUNITY
Start: 2020-02-19 | End: 2022-03-09

## 2022-03-09 RX ORDER — SODIUM SULFATE, POTASSIUM SULFATE, MAGNESIUM SULFATE 17.5; 3.13; 1.6 G/ML; G/ML; G/ML
17.5-3.13-1.6 SOLUTION, CONCENTRATE ORAL
Qty: 1 | Refills: 0 | Status: DISCONTINUED | COMMUNITY
Start: 2022-02-09 | End: 2022-03-09

## 2022-03-09 NOTE — HISTORY OF PRESENT ILLNESS
[Home] : at home, [unfilled] , at the time of the visit. [Medical Office: ([facility])___] : at the medical office located in  [Verbal consent obtained from patient] : the patient, [unfilled] [___ Month(s) Ago] : [unfilled] month(s) ago [_________] : Performed [unfilled] [FreeTextEntry4] : Nicolle [de-identified] : Patient is a 60 y/o with PMHx of HTN, CAD s/p PCI (9/25/19, Dr. Mcclure) on aspirin and Brilinta cholecystitis s/p cholecystectomy by Dr Avalos in 2020 who is s/p EGD and Colonoscopy. Patient on EGD with H Pylori and Gastric Ulcers. Colonoscopy relatively ok, fair prep. \par \par

## 2022-03-09 NOTE — ASSESSMENT
[FreeTextEntry1] : Patient is a 60 y/o with PMHx of HTN, CAD s/p PCI (9/25/19, Dr. Mcclure) on aspirin and Brilinta cholecystitis s/p cholecystectomy by Dr Avalos in 2020 who is s/p EGD and Colonoscopy. Patient on EGD with H Pylori and Gastric Ulcers. Colonoscopy relatively ok, fair prep. \par \par HPylori/ Ulcer\par Triple Therapy\par Repeat EGD in 8 weeks\par \par Colonosocpy, fair prep\par Repeat in 3 years

## 2022-05-02 ENCOUNTER — LABORATORY RESULT (OUTPATIENT)
Age: 62
End: 2022-05-02

## 2022-05-06 ENCOUNTER — OUTPATIENT (OUTPATIENT)
Dept: OUTPATIENT SERVICES | Facility: HOSPITAL | Age: 62
LOS: 1 days | Discharge: HOME | End: 2022-05-06

## 2022-05-06 DIAGNOSIS — Z90.49 ACQUIRED ABSENCE OF OTHER SPECIFIED PARTS OF DIGESTIVE TRACT: Chronic | ICD-10-CM

## 2022-05-20 ENCOUNTER — NON-APPOINTMENT (OUTPATIENT)
Age: 62
End: 2022-05-20

## 2022-05-24 ENCOUNTER — LABORATORY RESULT (OUTPATIENT)
Age: 62
End: 2022-05-24

## 2022-05-25 ENCOUNTER — APPOINTMENT (OUTPATIENT)
Dept: GASTROENTEROLOGY | Facility: CLINIC | Age: 62
End: 2022-05-25

## 2022-05-27 ENCOUNTER — TRANSCRIPTION ENCOUNTER (OUTPATIENT)
Age: 62
End: 2022-05-27

## 2022-05-27 ENCOUNTER — OUTPATIENT (OUTPATIENT)
Dept: OUTPATIENT SERVICES | Facility: HOSPITAL | Age: 62
LOS: 1 days | Discharge: HOME | End: 2022-05-27
Payer: COMMERCIAL

## 2022-05-27 ENCOUNTER — RESULT REVIEW (OUTPATIENT)
Age: 62
End: 2022-05-27

## 2022-05-27 VITALS
DIASTOLIC BLOOD PRESSURE: 71 MMHG | SYSTOLIC BLOOD PRESSURE: 112 MMHG | TEMPERATURE: 98 F | RESPIRATION RATE: 18 BRPM | WEIGHT: 205.03 LBS | HEART RATE: 76 BPM | HEIGHT: 73 IN

## 2022-05-27 VITALS
SYSTOLIC BLOOD PRESSURE: 115 MMHG | RESPIRATION RATE: 18 BRPM | DIASTOLIC BLOOD PRESSURE: 68 MMHG | OXYGEN SATURATION: 97 % | HEART RATE: 71 BPM

## 2022-05-27 DIAGNOSIS — Z90.49 ACQUIRED ABSENCE OF OTHER SPECIFIED PARTS OF DIGESTIVE TRACT: Chronic | ICD-10-CM

## 2022-05-27 PROCEDURE — 43239 EGD BIOPSY SINGLE/MULTIPLE: CPT

## 2022-05-27 PROCEDURE — 88312 SPECIAL STAINS GROUP 1: CPT | Mod: 26

## 2022-05-27 PROCEDURE — 88305 TISSUE EXAM BY PATHOLOGIST: CPT | Mod: 26

## 2022-05-27 NOTE — ASU DISCHARGE PLAN (ADULT/PEDIATRIC) - NS MD DC FALL RISK RISK
For information on Fall & Injury Prevention, visit: [email or web address] OR  [email or web address] OR  https://www.cdc.gov/steadi/patient.html

## 2022-05-27 NOTE — ASU DISCHARGE PLAN (ADULT/PEDIATRIC) - CARE PROVIDER_API CALL
Ron Garber)  Gastroenterology; Internal Medicine  [address] [place] [address]  Phone: [phone]  Fax: [phone]  Follow Up Time:

## 2022-05-31 LAB — SURGICAL PATHOLOGY STUDY: SIGNIFICANT CHANGE UP

## 2022-06-01 DIAGNOSIS — K25.9 GASTRIC ULCER, UNSPECIFIED AS ACUTE OR CHRONIC, WITHOUT HEMORRHAGE OR PERFORATION: ICD-10-CM

## 2022-06-01 DIAGNOSIS — E78.00 PURE HYPERCHOLESTEROLEMIA, UNSPECIFIED: ICD-10-CM

## 2022-06-01 DIAGNOSIS — I10 ESSENTIAL (PRIMARY) HYPERTENSION: ICD-10-CM

## 2022-06-01 DIAGNOSIS — Z79.82 LONG TERM (CURRENT) USE OF ASPIRIN: ICD-10-CM

## 2022-06-01 DIAGNOSIS — Z90.49 ACQUIRED ABSENCE OF OTHER SPECIFIED PARTS OF DIGESTIVE TRACT: ICD-10-CM

## 2022-06-01 DIAGNOSIS — K31.A11 GASTRIC INTESTINAL METAPLASIA WITHOUT DYSPLASIA, INVOLVING THE ANTRUM: ICD-10-CM

## 2022-06-01 DIAGNOSIS — I25.10 ATHEROSCLEROTIC HEART DISEASE OF NATIVE CORONARY ARTERY WITHOUT ANGINA PECTORIS: ICD-10-CM

## 2022-06-01 DIAGNOSIS — K29.50 UNSPECIFIED CHRONIC GASTRITIS WITHOUT BLEEDING: ICD-10-CM

## 2022-06-14 RX ORDER — OMEPRAZOLE 40 MG/1
40 CAPSULE, DELAYED RELEASE ORAL
Qty: 28 | Refills: 0 | Status: DISCONTINUED | COMMUNITY
Start: 2022-03-09 | End: 2022-06-14

## 2022-06-14 RX ORDER — METOPROLOL SUCCINATE 25 MG/1
25 TABLET, EXTENDED RELEASE ORAL
Qty: 90 | Refills: 0 | Status: DISCONTINUED | COMMUNITY
Start: 2020-03-18 | End: 2022-06-14

## 2022-06-14 RX ORDER — LEVOFLOXACIN 500 MG/1
500 TABLET, FILM COATED ORAL DAILY
Qty: 14 | Refills: 0 | Status: DISCONTINUED | COMMUNITY
Start: 2022-03-09 | End: 2022-06-14

## 2022-06-14 RX ORDER — ASPIRIN 81 MG/1
81 TABLET, COATED ORAL
Qty: 90 | Refills: 0 | Status: DISCONTINUED | COMMUNITY
Start: 2020-03-13 | End: 2022-06-14

## 2022-06-14 RX ORDER — AMOXICILLIN 500 MG/1
500 TABLET, FILM COATED ORAL
Qty: 56 | Refills: 0 | Status: DISCONTINUED | COMMUNITY
Start: 2022-03-09 | End: 2022-06-14

## 2022-06-16 ENCOUNTER — APPOINTMENT (OUTPATIENT)
Dept: GASTROENTEROLOGY | Facility: CLINIC | Age: 62
End: 2022-06-16
Payer: COMMERCIAL

## 2022-06-16 DIAGNOSIS — K21.9 GASTRO-ESOPHAGEAL REFLUX DISEASE W/OUT ESOPHAGITIS: ICD-10-CM

## 2022-06-16 DIAGNOSIS — K25.9 GASTRIC ULCER, UNSPECIFIED AS ACUTE OR CHRONIC, W/OUT HEMORRHAGE OR PERFORATION: ICD-10-CM

## 2022-06-16 PROCEDURE — 99442: CPT

## 2022-06-16 RX ORDER — OMEPRAZOLE 40 MG/1
40 CAPSULE, DELAYED RELEASE ORAL
Qty: 30 | Refills: 0 | Status: ACTIVE | COMMUNITY
Start: 2022-06-16 | End: 1900-01-01

## 2022-06-16 NOTE — ASSESSMENT
[FreeTextEntry1] : Patient is a 62 y/o with PMHx of HTN, CAD s/p PCI (9/25/19, Dr. Mcclure) on aspirin and Brilinta cholecystitis s/p cholecystectomy by Dr Avalos in 2020 who is s/p EGD and Colonoscopy. Patient on EGD with H Pylori and Gastric Ulcers. Repeat EGD was reassuring had clean base ulcer but takes ASA daily and not on PPI daily. Omeprazole daily for one month than as needed. Follow up 1 year\par \par \par EGD discussed with patient\par No IM or H pylori\par Omeprazole daily for 30 days\par Follow up 1 year

## 2022-06-16 NOTE — HISTORY OF PRESENT ILLNESS
[Home] : at home, [unfilled] , at the time of the visit. [Verbal consent obtained from patient] : the patient, [unfilled] [___ Month(s) Ago] : [unfilled] month(s) ago [_________] : Performed [unfilled] [FreeTextEntry4] : Ridge [de-identified] : 3/9/22 [de-identified] : Patient is a 60 y/o with PMHx of HTN, CAD s/p PCI (9/25/19, Dr. Mcclure) on aspirin and Brilinta cholecystitis s/p cholecystectomy by Dr Avalos in 2020 who is s/p EGD and Colonoscopy. Patient on EGD with H Pylori and Gastric Ulcers. Repeat EGD was reassuring had clean base ulcer but takes ASA daily and not on PPI daily. \par \par

## 2022-06-30 ENCOUNTER — APPOINTMENT (OUTPATIENT)
Age: 62
End: 2022-06-30

## 2022-06-30 VITALS
DIASTOLIC BLOOD PRESSURE: 70 MMHG | RESPIRATION RATE: 14 BRPM | WEIGHT: 205 LBS | HEART RATE: 82 BPM | SYSTOLIC BLOOD PRESSURE: 104 MMHG | HEIGHT: 73 IN | BODY MASS INDEX: 27.17 KG/M2 | OXYGEN SATURATION: 99 %

## 2022-06-30 PROCEDURE — 99203 OFFICE O/P NEW LOW 30 MIN: CPT | Mod: 25

## 2022-06-30 PROCEDURE — G0296 VISIT TO DETERM LDCT ELIG: CPT

## 2022-06-30 RX ORDER — ALBUTEROL SULFATE 90 UG/1
108 (90 BASE) INHALANT RESPIRATORY (INHALATION)
Qty: 1 | Refills: 5 | Status: ACTIVE | COMMUNITY
Start: 2022-06-30

## 2022-06-30 NOTE — REASON FOR VISIT
[Initial] : an initial visit [Shortness of Breath] : shortness of breath [Pulmonary Nodules] : pulmonary nodules

## 2022-07-19 ENCOUNTER — APPOINTMENT (OUTPATIENT)
Dept: CARDIOTHORACIC SURGERY | Facility: CLINIC | Age: 62
End: 2022-07-19

## 2022-07-19 DIAGNOSIS — F17.210 NICOTINE DEPENDENCE, CIGARETTES, UNCOMPLICATED: ICD-10-CM

## 2022-07-19 DIAGNOSIS — F17.200 NICOTINE DEPENDENCE, UNSPECIFIED, UNCOMPLICATED: ICD-10-CM

## 2022-07-19 PROCEDURE — G0296 VISIT TO DETERM LDCT ELIG: CPT

## 2022-07-19 NOTE — ED ADULT TRIAGE NOTE - WEIGHT IN LBS
[facility]  Same-Day EGD Procedure  Adult Discharge Orders & Instructions     You had a procedure known as an Esophagogastroduodenoscopy (EGD) of either the upper gastrointestinal (GI) tract. An UPPER EGD will place a camera into either your mouth or nose to examine your esophagus, stomach, and/or small intestines. Biopsies, small samples of tissue, are often taken to help diagnose and/or classify stages of disease growth. The EGD is also used to help locate areas of the GI tract that may require further treatment (dilation, stenting, clipping, removal, etc.) or medical interventions (medication specific).      After your procedure   Make sure to clarify with your healthcare provider any diet restrictions (For example, clear liquid, low fat, no caffeine, etc.)   Do NOT take aspirin containing medications or any other blood-thinning medicines (anticoagulants) until your healthcare provider says it's OK.   You MAY be prescribed antibiotics, depending on what was done and/or found during your EUS, make sure to take antibiotics as prescribed by your healthcare provider    For 24 hours after surgery  Get plenty of rest.  A responsible adult must stay with you for at least 24 hours after you leave the hospital.   Do not drive or use heavy equipment.  If you have weakness or tingling, don't drive or use heavy equipment until this feeling goes away.  Do not drink alcohol.  Avoid strenuous or risky activities (gym, yoga, cycling, etc.).  Ask for help when climbing stairs.   You may feel lightheaded.  IF so, sit for a few minutes before standing.  Have someone help you get up.   If you have nausea (feel sick to your stomach): Drink only clear liquids such as apple juice, ginger ale, broth or 7-Up.  Rest may also help.  Be sure to drink enough fluids.  Move to a regular diet as you feel able.  If you feel bloated or have too much gas, use a heating pad on your belly to help reduce the  discomfort. This should help you feel better.   You may have a slight fever. This is normal for the first 24 hours.   You may have a dry mouth, a sore throat, muscle aches or trouble sleeping.  These are normal and will go away after 24 hours. A sore throat is most common. Use lozenges or gargle with salt water to ease the discomfort.   Do not make important or legal decisions.      Call your doctor for any of the following:  Chest pain, and/or shortness of breath  Abdominal  pain, bloating or cramping that has not improved or does not respond to pain reliving medications (Tylenol or narcotics if prescribed)   Difficulty swallowing or feeling as though food or liquids are stuck in your throat   Sore throat lasting more than 2 days or pain that has worsened over time   Black or tarry stools   Nausea and/or vomiting that is not resolving or has not responded to anti-nausea medications prescribed to you   It has been over 8 to 10 hours since surgery and you are still not able to urinate (pass water)   Headache for over 24 hours   Fever over 100.5 F (38 C) lasting more than 24 hours after the procedure   Signs of jaundice or blockage (fever, chills, abdominal pain, yellowing of the whites of your eyes, yellowing of your skin, and/or passing darker than normal urine)     To contact a doctor, call:   [ ] ____Dr. Guru Dias @ [phone] (GI Clinic) or [phone] _______ (Monday thru Friday 8:00am to 4:30pm)   [ ] [phone] and ask for the _____GI______________ resident on call (answered 24 hours a day)   [ ] Emergency Department: [facility]: [phone]   Take it easy when you get home.  Remember, same day surgery DOES NOT MEAN SAME DAY RECOVERY!  Healing is a gradual process.  You will need some time to recover - you may be more tired than you realize at first.  Rest and relax for at least the first 24 hours at home.  You'll feel better and heal faster if you take good care of yourself.       229.9

## 2022-07-20 VITALS — HEIGHT: 73 IN | WEIGHT: 205 LBS | BODY MASS INDEX: 27.17 KG/M2

## 2022-07-20 PROBLEM — F17.210 CIGARETTE SMOKER: Status: ACTIVE | Noted: 2022-07-20

## 2022-07-20 PROBLEM — F17.200 CURRENT SMOKER: Status: ACTIVE | Noted: 2022-07-19

## 2022-07-20 NOTE — HISTORY OF PRESENT ILLNESS
[Current] : current smoker [Home] : at home, [unfilled] , at the time of the visit. [Medical Office: ([facility])___] : at the medical office located in  [Verbal consent obtained from patient] : the patient, [unfilled] [TextBox_13] : Mr. VICKY SPENCER is a 62 year old man with a history of HTN, Cardiac stents x 1, DLD\par He was seen in the office by Dr. Ortiz for review of eligibility for, as well as, discussion of Low-Dose CT lung cancer screening program. Over the telephone today we reviewed and confirmed that the patient meets screening eligibility criteria:\par -Age: 62 year \par Smoking status:\par Current smoker \par -Number of pack(s) per day: 1\par -Number of years smoked: 40\par -Number of pack years smokin+\par \par Mr. SPENCER denies any signs or symptoms of lung cancer including new cough, change in cough, hemoptysis and unintentional weight loss. \par \par Mr. SPENCER denies any personal history of lung cancer. No lung cancer in a 1st degree relative. Denies any history of lung disease. Has history of occupational exposures - asbestos\par \par  [TextBox_6] : 1 [TextBox_8] : 40

## 2022-07-20 NOTE — PLAN
[Smoking Cessation Guidance Provided] : Smoking cessation guidance was provided to patient [Smoking Cessation Pamphlet Given] : smoking cessation pamphlet given to patient  [Smoking Cessation] : smoking cessation [Lifestlye changes] : lifestyle changes [Regular follow-up with healthcare provider] : regular follow-up with healthcare provider [FreeTextEntry1] : Plan:\par -Low Dose CT chest for lung cancer screening\par -Follow up with patient and his referring provider after his LDCT results have been reviewed by the multi-disciplinary clinical team\par -Encouraged smoking cessation\par -[facility] Smokers Quitline literature shared with patient and Staying Smoke Free brochure reviewed\par -Smoking Cessation was offered, -referred \par -Referred to CTC - yes\par \par Should I Screen? tool utilized. 6 year risk of lung cancer is 2.3 %. Patient wishes to proceed with screening.\par Engaged in shared decision making with Mr. VICKY SPENCER . Answered all questions. He verbalized understanding and agreement. He knows to call back with any questions or concerns\par \par

## 2022-07-20 NOTE — REASON FOR VISIT
Order signed.  Send to [facility].  Marlen Ma M.D.     Procedure Scheduling Information    Procedure:  EGD  Last Procedure:  NA  Procedure Date:  Fri Aig 30, 2019  Procedure Time:  1:15 PM  Reason:  Choking, lump in throat , GERD  Referring provider:  Mayra  Location: [facility]   Prep:  N/A  Sedation:  IV Sedation     If MAC, why:  N/A  Pacemaker/Defibrillator:  no     Device Interrogation Form Faxed:  no  Anticoagulation:  no    Prescribing Provider:  lxei elliott               [Initial Evaluation] : an initial evaluation visit [Review of Eligibility] : review of eligibility [Low-Dose CT Screening Discussion] : low-dose CT lung cancer screening discussion [Virtual Visit] : virtual visit [FreeTextEntry1] : Baseline LDCT

## 2022-10-07 ENCOUNTER — OUTPATIENT (OUTPATIENT)
Dept: OUTPATIENT SERVICES | Facility: HOSPITAL | Age: 62
LOS: 1 days | Discharge: HOME | End: 2022-10-07

## 2022-10-07 ENCOUNTER — RESULT REVIEW (OUTPATIENT)
Age: 62
End: 2022-10-07

## 2022-10-07 DIAGNOSIS — Z87.891 PERSONAL HISTORY OF NICOTINE DEPENDENCE: ICD-10-CM

## 2022-10-07 DIAGNOSIS — Z90.49 ACQUIRED ABSENCE OF OTHER SPECIFIED PARTS OF DIGESTIVE TRACT: Chronic | ICD-10-CM

## 2022-10-07 PROCEDURE — 71271 CT THORAX LUNG CANCER SCR C-: CPT | Mod: 26

## 2022-10-21 ENCOUNTER — LABORATORY RESULT (OUTPATIENT)
Age: 62
End: 2022-10-21

## 2022-10-27 ENCOUNTER — APPOINTMENT (OUTPATIENT)
Age: 62
End: 2022-10-27

## 2022-10-27 VITALS
RESPIRATION RATE: 12 BRPM | OXYGEN SATURATION: 97 % | SYSTOLIC BLOOD PRESSURE: 130 MMHG | HEART RATE: 93 BPM | BODY MASS INDEX: 26.77 KG/M2 | WEIGHT: 202 LBS | HEIGHT: 73 IN | DIASTOLIC BLOOD PRESSURE: 70 MMHG

## 2022-10-27 PROCEDURE — 99214 OFFICE O/P EST MOD 30 MIN: CPT | Mod: 25

## 2022-10-27 PROCEDURE — 94010 BREATHING CAPACITY TEST: CPT

## 2022-10-27 RX ORDER — ALBUTEROL SULFATE 90 UG/1
108 (90 BASE) INHALANT RESPIRATORY (INHALATION)
Qty: 1 | Refills: 3 | Status: ACTIVE | COMMUNITY
Start: 2022-10-27

## 2022-10-27 NOTE — HISTORY OF PRESENT ILLNESS
[Doing Well] : doing well [None] : The patient is not currently on any medications for ~his/her~ COPD [Goals--Doing Well] : the patient is doing well with ~his/her~ COPD goals [PFTs] : pulmonary function tests

## 2022-10-27 NOTE — ASSESSMENT
[FreeTextEntry1] : Midl COPD \par Small lugn nodules stable on repeat T \par Significant smoking history, quit 14 years ago\par Smoking cigars regularly

## 2022-10-27 NOTE — PLAN
[Smoking Cessation Guidance Provided] : Smoking cessation guidance was provided to patient [Smoking Cessation Pamphlet Given] : smoking cessation pamphlet given to patient  [Smoking Cessation] : smoking cessation [Lifestlye changes] : lifestyle changes [Regular follow-up with healthcare provider] : regular follow-up with healthcare provider [FreeTextEntry1] : understanding and agreement. He knows to call back with any questions or concerns\par \par

## 2022-12-15 NOTE — PATIENT PROFILE ADULT - OVER THE PAST TWO WEEKS, HAVE YOU FELT LITTLE INTEREST OR PLEASURE IN DOING THINGS?
What Type Of Note Output Would You Prefer (Optional)?: Standard Output What Is The Reason For Today's Visit?: Full Body Skin Examination What Is The Reason For Today's Visit? (Being Monitored For X): concerning skin lesions on an annual basis no

## 2023-08-16 NOTE — ED ADULT NURSE NOTE - PMH
Monique Chaparro discharged to home.   Patient provided with the following educational materials upon discharge:  AVS, follow up instructions.   Valuables and belongings sent with patient.   discharge summary and discharge instructions reviewed with patient.  Patient verbalized understanding   CAD in native artery  stents 9/2019  HTN (hypertension)

## 2023-09-25 NOTE — H&P PST ADULT - LYMPH NODES
Outpatient Physical Therapy  INITIAL EVALUATION    [facility]  [address], [address]  [place] [address]  Phone:  [phone]    Date of Evaluation: 2023    Patient: Jaclyn Mejia  YOB: 1966  MRN: [id number]     Referring Provider: Elmo Donaldson M.D.  [address]  [address]  [place] [address]   Referring Diagnosis Chronic bilateral low back pain with right-sided sciatica [M54.41, G89.29];Lumbar radiculopathy [M54.16];Spondylolisthesis of lumbar region [M43.16];Ambulates with cane [Z99.89];SI (sacroiliac) joint dysfunction [M53.3];Peripheral polyneuropathy [G62.9]     Time Calculation  Start time: 0130  Stop time: 0215 Time Calculation (min): 45 minutes         Chief Complaint: Back Problem    Visit Diagnoses     ICD-10-CM   1. Chronic bilateral low back pain with right-sided sciatica  M54.41    G89.29   2. Lumbar radiculopathy  M54.16   3. Spondylolisthesis of lumbar region  M43.16   4. Ambulates with cane  Z99.89   5. SI (sacroiliac) joint dysfunction  M53.3   6. Peripheral polyneuropathy  G62.9       Date of onset of impairment: Multiple active episodes found    Subjective:   History of Present Illness:     Date of onset:  8/10/2023    Mechanism of injury:  The patient is a 57 year old female who reports low back pain and (R) > (L) LE pain, hx of anterolisthesis at L3-L4, DDD throughout the spine and SI joint dysfunction. The patient has difficulty walking sitting, standing and using stairs. Hx of health conditions; please see record. She walks with a cane in the community and uses this for support. The patient takes Gabapentin for neuropathy and the pain.  Quality of life:  Good  Headaches:  no headaches  Ear problems: none  Sleep disturbance:  Interrupted sleep  Pain:     Current pain ratin    Quality:  Radiating, shooting, aching and dull ache    Pain timing:  In the evening    Relieving factors:  Pain medication    Aggravating  factors:  Bending, lifting, prolonged standing, walking and standing    Progression:  Stable  Social Support:     Lives in:  One-story house  Hand dominance:  Right  Diagnostic Tests:     Diagnostic Tests Comments:  HISTORY/REASON FOR EXAM:  chronic bilateral low back pain radiating down the posterior legs right worse than left.        TECHNIQUE/EXAM DESCRIPTION:  MRI of the lumbar spine without contrast.     Multiplanar multisequence MR examination of the lumbar spine. COMPARISON:  None.     FINDINGS:  There is minimal degenerative anterolisthesis of L3 on 4. There is no pathologic marrow infiltration. There is no focal osseous lesion. There is no perivertebral abnormality.     There is mild sacroiliac joint degeneration.        At the level of L5-S1,there is mild disc bulge with degeneration. Mild bilateral facet joint arthropathy is seen. There is no spinal or neural foraminal stenosis.     At the level of L4-5,there is asymmetric disc bulge with degeneration. Mild bilateral facet joint arthropathy is seen. There is no spinal or neural foraminal stenosis.     At the level of L3-4,there is mild diffuse disc bulge with facet joint degeneration. There is no spinal or neural foraminal stenosis.     At the level of the L2-3,there is no spinal or neural foraminal stenosis.     At the level of L1-2,there is no spinal or neural foraminal stenosis.     The conus terminates at the level of L1. There are multiple perineural cysts.     The visualized pre-and paraspinal soft tissues appear normal.     IMPRESSION:     Mild degenerative disease in the lumbar spine as described above.          Treatments:     Current treatment:  Heat and ice  Patient Goals:     Patient goals for therapy:  Decreased pain, increased motion and increased strength    Other patient goals:  To decrease pain and increase mobility      Past Medical History:   Diagnosis Date    Abdominal pain     Actinic keratosis 02/06/2018    Alcohol abuse     Allergy      Anxiety     Arthritis     Back pain     Back pain     Bronchitis     Chronic pain syndrome     Constipation     COPD (chronic obstructive pulmonary disease) (HCC)     Cough     Daytime sleepiness     DDD (degenerative disc disease), cervical     DDD (degenerative disc disease), thoracolumbar     Depression     Diarrhea     Diverticulosis     Dizziness     Essential tremor     Fatigue     Fibromyalgia     IBS (irritable bowel syndrome)     Insomnia     Migraine     Nausea     Painful joint     Pulmonary emphysema (HCC)     Restless leg syndrome     Ringing in ears     Snoring     SOB (shortness of breath)     Sore muscles     Sweat, sweating, excessive     Weakness     Wears glasses      Past Surgical History:   Procedure Laterality Date    ABDOMINAL HYSTERECTOMY TOTAL      CHOLECYSTECTOMY      HYSTERECTOMY LAPAROSCOPY      OTHER ABDOMINAL SURGERY      TONSILLECTOMY       Social History     Tobacco Use    Smoking status: Every Day     Current packs/day: 0.00     Average packs/day: 1 pack/day for 35.0 years (35.0 ttl pk-yrs)     Types: Cigarettes     Start date: 1987     Last attempt to quit: 2022     Years since quittin.2    Smokeless tobacco: Never   Substance Use Topics    Alcohol use: Yes     Comment: occasionally     Family and Occupational History     Socioeconomic History    Marital status: Single     Spouse name: Not on file    Number of children: Not on file    Years of education: Not on file    Highest education level: Associate degree: occupational, technical, or vocational program   Occupational History    Not on file       Objective     Postural Observations  Seated posture: fair  Standing posture: fair  Correction of posture: has no consistent effect      Tenderness     Left Hip   Tenderness in the sacroiliac joint.      Right Hip   Tenderness in the IT band and sacroiliac joint.     Active Range of Motion     Lumbar   Flexion: within functional limits  Extension: decreased  Left lateral  flexion: within functional limits  Right lateral flexion: decreased  Left rotation: within functional limits  Right rotation: decreased    Strength:      Abdominals   Left: 4+  Right: 4+  Lower abdominals: Able to maintain neutral statically    Back   Flexion: 4+  Extension: 4+  Lateral bend left: 5  Lateral bend right: 5  Rotation left: 4+  Rotation right: 5    Left Hip   Planes of Motion   Flexion: 5  Extension: 5  Abduction: 5  Adduction: 5    Right Hip   Planes of Motion   Flexion: 5  Extension: 5  Abduction: 5  Adduction: 5    Tests       Lumbar spine (left)      Negative slump.   Lumbar spine (right)     Negative slump.     Left Hip   SLR: Negative.     Right Hip   SLR: Negative.         Therapeutic Exercises (CPT 99358):     1. posterior pelvic tilts, 1 x10 reps 5 sec hold    2. hamstring stretch    Therapeutic Treatments and Modalities:     1. Manual Therapy (CPT 54281), Lumbar, CPA's    Time-based treatments/modalities:    Physical Therapy Timed Treatment Charges  Therapeutic exercise minutes (CPT 96674): 10 minutes      Assessment, Response and Plan:   Impairments: impaired physical strength, lacks appropriate home exercise program, limited mobility, pain with function and weight-bearing intolerance    Assessment details:  The patient is a 57 year old female who reports low back and bilateral LE pain with (R) LE > (L) LE symptoms bending walking sitting and using stairs. The patient may benefit from skilled physical therapy to address possible lumbar radiculopathy and work on postural conditioning, PROM/AROM, strength and conditioning, manual therapy techniques, functional training and activity tolerance   Barriers to therapy:  None  Prognosis: good    Goals:   Short Term Goals:   1) Indep with HEP   2) Transitions from seated positions to standing with less pain by 1-2 levels on VAS   3) Able to walk shorter distances with SPC and less pain 25% of the time  Short term goal time span:  2-4 weeks      Long  Term Goals:    1) Progression/regression with HEP   2) Able to stand and walk 50% of the time without symptoms in (R) hip and LE  3) Walking in the community with less support from cane 50% of the time   4) Decreased pain to the (R) hip while lying on her side at night from 5-6 to 3-4/10 on VAS or better  Long term goal time span:  4-6 weeks    Plan:   Therapy options:  Physical therapy treatment to continue  Planned therapy interventions:  Neuromuscular Re-education (CPT 92944), Self Care ADL Training (CPT 91677), Therapeutic Activities (CPT 59782) and Therapeutic Exercise (CPT 54043)  Frequency:  2x week  Duration in weeks:  6  Duration in visits:  12  Discussed with:  Patient  Plan details:  Functional Training, Self care: 12002 (1)   Neuromuscular Re-education: 34304 (1)   Therapeutic Activities: 81893 (1)   Therapeutic Exercise: 69078 (1)           Functional Assessment Used        Referring provider co-signature:  I have reviewed this plan of care and my co-signature certifies the need for services.    Certification Period: 09/25/2023 to  11/06/23    Physician Signature: ________________________________ Date: ______________                  No lymphadedenopathy

## 2023-10-16 ENCOUNTER — NON-APPOINTMENT (OUTPATIENT)
Age: 63
End: 2023-10-16

## 2023-10-17 ENCOUNTER — NON-APPOINTMENT (OUTPATIENT)
Age: 63
End: 2023-10-17

## 2023-10-17 ENCOUNTER — APPOINTMENT (OUTPATIENT)
Dept: OTOLARYNGOLOGY | Facility: CLINIC | Age: 63
End: 2023-10-17
Payer: COMMERCIAL

## 2023-10-17 VITALS — BODY MASS INDEX: 28.49 KG/M2 | HEIGHT: 73 IN | WEIGHT: 215 LBS

## 2023-10-17 DIAGNOSIS — J01.80 OTHER ACUTE SINUSITIS: ICD-10-CM

## 2023-10-17 PROCEDURE — 31231 NASAL ENDOSCOPY DX: CPT

## 2023-10-17 PROCEDURE — 99204 OFFICE O/P NEW MOD 45 MIN: CPT | Mod: 25

## 2023-10-17 RX ORDER — FLUTICASONE PROPIONATE 50 UG/1
50 SPRAY, METERED NASAL DAILY
Qty: 1 | Refills: 6 | Status: ACTIVE | COMMUNITY
Start: 2023-10-17 | End: 1900-01-01

## 2023-10-17 RX ORDER — AMOXICILLIN AND CLAVULANATE POTASSIUM 875; 125 MG/1; MG/1
875-125 TABLET, COATED ORAL
Qty: 20 | Refills: 0 | Status: ACTIVE | COMMUNITY
Start: 2023-10-17 | End: 1900-01-01

## 2023-10-17 NOTE — ASU PATIENT PROFILE, ADULT - NSICDXPASTSURGICALHX_GEN_ALL_CORE_FT
Spoke with [place]. Informed her that we need proof of income to fax to the patient assistance foundation [phone] so that the pt will be approved for continuous free trametinib and dabrafenib. THe medication that he is receiving soon is just a 1 month trial. [place] verbalized understanding and will bring in the 1040 tax document on Monday for the chemo ed teaching. PAST SURGICAL HISTORY:  History of cholecystectomy

## 2023-10-30 ENCOUNTER — APPOINTMENT (OUTPATIENT)
Dept: PULMONOLOGY | Facility: CLINIC | Age: 63
End: 2023-10-30

## 2023-11-01 NOTE — H&P ADULT - NSHPPHYSICALEXAM_GEN_ALL_CORE
Pt wants to schedule her MAWV before the end of the year. She does not want to wait until January to be scheduled. There aren't any openings until the new  year. Okay to schedule with another provider in the office. Please advise. PHYSICAL EXAM:  GENERAL: NAD, well-appearing  CHEST/LUNG: Clear to auscultation bilaterally  HEART: Regular rate and rhythm  ABDOMEN: Soft, Nontender, Nondistended; healing PTC site w/o erythema or drainage, non-TTP  EXTREMITIES: No clubbing, cyanosis, or edema

## 2023-11-16 ENCOUNTER — APPOINTMENT (OUTPATIENT)
Dept: OTOLARYNGOLOGY | Facility: CLINIC | Age: 63
End: 2023-11-16
Payer: COMMERCIAL

## 2023-11-16 VITALS — HEIGHT: 73 IN | WEIGHT: 220 LBS | BODY MASS INDEX: 29.16 KG/M2

## 2023-11-16 DIAGNOSIS — R44.8 OTHER SYMPTOMS AND SIGNS INVOLVING GENERAL SENSATIONS AND PERCEPTIONS: ICD-10-CM

## 2023-11-16 DIAGNOSIS — Z79.01 LONG TERM (CURRENT) USE OF ANTICOAGULANTS: ICD-10-CM

## 2023-11-16 DIAGNOSIS — Q35.9 CLEFT PALATE, UNSPECIFIED: ICD-10-CM

## 2023-11-16 DIAGNOSIS — R09.81 NASAL CONGESTION: ICD-10-CM

## 2023-11-16 DIAGNOSIS — J32.8 OTHER CHRONIC SINUSITIS: ICD-10-CM

## 2023-11-16 PROCEDURE — 31231 NASAL ENDOSCOPY DX: CPT

## 2023-11-16 PROCEDURE — 99213 OFFICE O/P EST LOW 20 MIN: CPT | Mod: 25

## 2023-11-16 RX ORDER — FEXOFENADINE HCL 60 MG/1
60 TABLET, FILM COATED ORAL
Qty: 40 | Refills: 6 | Status: ACTIVE | COMMUNITY
Start: 2023-11-16 | End: 1900-01-01

## 2023-12-26 ENCOUNTER — RESULT REVIEW (OUTPATIENT)
Age: 63
End: 2023-12-26

## 2023-12-26 ENCOUNTER — OUTPATIENT (OUTPATIENT)
Dept: OUTPATIENT SERVICES | Facility: HOSPITAL | Age: 63
LOS: 1 days | End: 2023-12-26
Payer: COMMERCIAL

## 2023-12-26 DIAGNOSIS — F17.210 NICOTINE DEPENDENCE, CIGARETTES, UNCOMPLICATED: ICD-10-CM

## 2023-12-26 DIAGNOSIS — Z00.8 ENCOUNTER FOR OTHER GENERAL EXAMINATION: ICD-10-CM

## 2023-12-26 DIAGNOSIS — Z90.49 ACQUIRED ABSENCE OF OTHER SPECIFIED PARTS OF DIGESTIVE TRACT: Chronic | ICD-10-CM

## 2023-12-26 PROCEDURE — 71271 CT THORAX LUNG CANCER SCR C-: CPT

## 2023-12-26 PROCEDURE — 71271 CT THORAX LUNG CANCER SCR C-: CPT | Mod: 26

## 2023-12-27 DIAGNOSIS — F17.210 NICOTINE DEPENDENCE, CIGARETTES, UNCOMPLICATED: ICD-10-CM

## 2024-01-04 ENCOUNTER — APPOINTMENT (OUTPATIENT)
Dept: PULMONOLOGY | Facility: CLINIC | Age: 64
End: 2024-01-04
Payer: COMMERCIAL

## 2024-01-04 VITALS
SYSTOLIC BLOOD PRESSURE: 110 MMHG | HEART RATE: 83 BPM | OXYGEN SATURATION: 98 % | HEIGHT: 73 IN | WEIGHT: 220 LBS | DIASTOLIC BLOOD PRESSURE: 80 MMHG | BODY MASS INDEX: 29.16 KG/M2

## 2024-01-04 DIAGNOSIS — J44.9 CHRONIC OBSTRUCTIVE PULMONARY DISEASE, UNSPECIFIED: ICD-10-CM

## 2024-01-04 DIAGNOSIS — R06.02 SHORTNESS OF BREATH: ICD-10-CM

## 2024-01-04 DIAGNOSIS — R93.89 ABNORMAL FINDINGS ON DIAGNOSTIC IMAGING OF OTHER SPECIFIED BODY STRUCTURES: ICD-10-CM

## 2024-01-04 PROCEDURE — 99214 OFFICE O/P EST MOD 30 MIN: CPT

## 2024-01-04 RX ORDER — ALBUTEROL SULFATE 90 UG/1
108 (90 BASE) AEROSOL, METERED RESPIRATORY (INHALATION)
Qty: 1 | Refills: 3 | Status: ACTIVE | COMMUNITY
Start: 2024-01-04 | End: 1900-01-01

## 2024-01-04 NOTE — ASSESSMENT
[FreeTextEntry1] : Mild COPD Small lung nodules stable on repeat CT Significant smoking history, quit 14 years ago.  Smoking cigars regularly. Small lung nodules HO 30 PY smoking quit cig 14 years ago.  Smoking cigars daily since

## 2024-01-04 NOTE — HISTORY OF PRESENT ILLNESS
[Doing Well] : doing well [None] : The patient is not currently on any medications for ~his/her~ COPD [Adherent] : the patient is adherent with ~his/her~ medication regimen [Goals--Doing Well] : the patient is doing well with ~his/her~ COPD goals [PFTs] : pulmonary function tests

## 2024-01-15 ENCOUNTER — RX RENEWAL (OUTPATIENT)
Age: 64
End: 2024-01-15

## 2024-01-15 RX ORDER — LEVOCETIRIZINE DIHYDROCHLORIDE 5 MG/1
5 TABLET ORAL DAILY
Qty: 90 | Refills: 1 | Status: ACTIVE | COMMUNITY
Start: 2023-10-17 | End: 1900-01-01

## 2024-02-12 NOTE — PRE-ANESTHESIA EVALUATION ADULT - NSANTHAGERD_ENT_A_CORE
Was nice seeing you today.  Continue same medication.  Have lab work done before next appointment if labs were ordered today.  Jake everett  Call/ contact our office with any concerns.    If you have labs or test done and you can't see the report in your chart or you didn't here from us in 2 weeks after test/labs done , please, call our office for reports.  Please , do not assume that they were normal.    This is very likely a viral illness and will resolve on its own.  For the time being, symptomatic control will help you stay functional and feel better during the day  Be sure to get at least 8 hours of sleep a night to promote recovery, and to drink plenty of fluids every day  Appropriate testing for upper respiratory pathogens has been ordered  Around 98% of all upper respiratory infections in your age group are caused by viruses. Antibiotics are unlikely to benefit while only giving you possible side effects including diarrhea and increasing the risk of antibiotic resistant infections and C. difficile. Antibiotics do not fight viral infections.   The best treatment is the tincture of time and allowing your body to fight the infection on its own.    Appropriate testing for upper respiratory pathogens may have been ordered, and pending results, treatment will be altered to address specific pathogens if necessary.    You could use these medications, please use them as follows:  Benzonatate 2-3 times during the day for coughing.   Zyrtec/Claritin once a day before bed for rhinorrhea and sinus pressure/inflammation/allergic symptoms. This will help with post-nasal drip causing sore throat.   Fluticasone nasal spray for sinus pressure and allergic symptoms, this takes a few days to start working, however.  Azelastine nasal spray in the intermediate will help with rhinorrhea and sinus pain and pressure for the short term  Motrin /Tylenol prn for pain and fever.       
Yes

## 2024-03-27 NOTE — ASU PREOP CHECKLIST - HEIGHT IN CM
185.42 Patient with persistent dysuria and noted hematuria, UA consistent with UTI.  Patient was started on p.o. antibiotics yesterday.  Patient admitted for IV antibiotics, gentle IV hydration, renal function and H&H monitoring.  Medical admitting resident hospitalist Dr. Maxwell endorsed. Pt agrees with admission.   I had a detailed discussion with the patient and/or guardian regarding the historical points, exam findings, and any diagnostic results supporting the admit diagnosis.

## 2024-07-12 NOTE — ED ADULT NURSE NOTE - NS_SISCREENINGSR_GEN_ALL_ED
GOAL: Pt will increase strength by one muscle grade in 4 weeks to improve ability to complete ADLs and functional tasks.
Negative

## 2024-09-26 ENCOUNTER — APPOINTMENT (OUTPATIENT)
Dept: OTOLARYNGOLOGY | Facility: CLINIC | Age: 64
End: 2024-09-26
Payer: COMMERCIAL

## 2024-09-26 VITALS — WEIGHT: 230 LBS | HEIGHT: 73 IN | BODY MASS INDEX: 30.48 KG/M2

## 2024-09-26 DIAGNOSIS — R09.81 NASAL CONGESTION: ICD-10-CM

## 2024-09-26 DIAGNOSIS — Q35.9 CLEFT PALATE, UNSPECIFIED: ICD-10-CM

## 2024-09-26 DIAGNOSIS — J32.8 OTHER CHRONIC SINUSITIS: ICD-10-CM

## 2024-09-26 DIAGNOSIS — R44.8 OTHER SYMPTOMS AND SIGNS INVOLVING GENERAL SENSATIONS AND PERCEPTIONS: ICD-10-CM

## 2024-09-26 DIAGNOSIS — Z79.01 LONG TERM (CURRENT) USE OF ANTICOAGULANTS: ICD-10-CM

## 2024-09-26 PROCEDURE — 31231 NASAL ENDOSCOPY DX: CPT

## 2024-09-26 PROCEDURE — 99214 OFFICE O/P EST MOD 30 MIN: CPT | Mod: 25

## 2024-09-26 RX ORDER — LEVOCETIRIZINE DIHYDROCHLORIDE 5 MG/1
5 TABLET ORAL DAILY
Qty: 30 | Refills: 3 | Status: ACTIVE | COMMUNITY
Start: 2024-09-26 | End: 1900-01-01

## 2024-09-26 RX ORDER — FLUTICASONE PROPIONATE 50 UG/1
50 SPRAY, METERED NASAL DAILY
Qty: 1 | Refills: 7 | Status: ACTIVE | COMMUNITY
Start: 2024-09-26 | End: 1900-01-01

## 2024-09-26 NOTE — ASSESSMENT
[FreeTextEntry1] : Pt will be discussed with oral surgery. Pt will wait on dental implant at this time.

## 2024-09-26 NOTE — HISTORY OF PRESENT ILLNESS
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 On blood thinners. States that he is still having some congestion. Occasional sinus infections. Last sinus infection in July 2024. Prescribed Metronidazole and Amoxicillin with improvement. Used Fexofenadine with improvement. Currently feels a nasal congestion. Want to show scan from dentist.

## 2024-09-26 NOTE — PROCEDURE
[Rigid Endoscope] : examined with a rigid endoscope [Congested] : congested [Umm] : umm [FreeTextEntry6] : The following anatomic sites were directly examined in a sequential fashion: The scope was introduced in the nasal passage between the middle and inferior turbinates to exam the inferior portion of the middle meatus and the fontanelle, as well as the maxillary ostia. Next, the scope was passed medically and posteriorly to the middle turbinates to examine the sphenoethmoid recess and the superior turbinate region.

## 2024-09-26 NOTE — REASON FOR VISIT
[Subsequent Evaluation] : a subsequent evaluation for [FreeTextEntry2] : Oral nasal fistula, facial pressure, chronic sinusitis

## 2024-11-11 ENCOUNTER — APPOINTMENT (OUTPATIENT)
Age: 64
End: 2024-11-11
Payer: COMMERCIAL

## 2024-11-11 ENCOUNTER — OUTPATIENT (OUTPATIENT)
Dept: OUTPATIENT SERVICES | Facility: HOSPITAL | Age: 64
LOS: 1 days | End: 2024-11-11
Payer: COMMERCIAL

## 2024-11-11 ENCOUNTER — RESULT REVIEW (OUTPATIENT)
Age: 64
End: 2024-11-11

## 2024-11-11 DIAGNOSIS — J32.8 OTHER CHRONIC SINUSITIS: ICD-10-CM

## 2024-11-11 DIAGNOSIS — Z00.8 ENCOUNTER FOR OTHER GENERAL EXAMINATION: ICD-10-CM

## 2024-11-11 DIAGNOSIS — Q35.9 CLEFT PALATE, UNSPECIFIED: ICD-10-CM

## 2024-11-11 DIAGNOSIS — Z90.49 ACQUIRED ABSENCE OF OTHER SPECIFIED PARTS OF DIGESTIVE TRACT: Chronic | ICD-10-CM

## 2024-11-11 PROCEDURE — 70486 CT MAXILLOFACIAL W/O DYE: CPT | Mod: 26

## 2024-11-11 PROCEDURE — 70486 CT MAXILLOFACIAL W/O DYE: CPT

## 2024-11-12 DIAGNOSIS — J32.8 OTHER CHRONIC SINUSITIS: ICD-10-CM

## 2024-11-12 DIAGNOSIS — Q35.9 CLEFT PALATE, UNSPECIFIED: ICD-10-CM

## 2024-12-13 NOTE — PATIENT PROFILE ADULT - STATED REASON FOR ADMISSION
Detail Level: Detailed Quality 226: Preventive Care And Screening: Tobacco Use: Screening And Cessation Intervention: Patient screened for tobacco use and is an ex/non-smoker Fever, chills, wife reports pt was altered

## 2024-12-23 ENCOUNTER — OUTPATIENT (OUTPATIENT)
Dept: OUTPATIENT SERVICES | Facility: HOSPITAL | Age: 64
LOS: 1 days | End: 2024-12-23
Payer: COMMERCIAL

## 2024-12-23 ENCOUNTER — RESULT REVIEW (OUTPATIENT)
Age: 64
End: 2024-12-23

## 2024-12-23 DIAGNOSIS — Z90.49 ACQUIRED ABSENCE OF OTHER SPECIFIED PARTS OF DIGESTIVE TRACT: Chronic | ICD-10-CM

## 2024-12-23 DIAGNOSIS — R91.1 SOLITARY PULMONARY NODULE: ICD-10-CM

## 2024-12-23 DIAGNOSIS — Z00.8 ENCOUNTER FOR OTHER GENERAL EXAMINATION: ICD-10-CM

## 2024-12-23 PROCEDURE — 71250 CT THORAX DX C-: CPT

## 2024-12-23 PROCEDURE — 71250 CT THORAX DX C-: CPT | Mod: 26

## 2024-12-24 DIAGNOSIS — R91.1 SOLITARY PULMONARY NODULE: ICD-10-CM

## 2024-12-26 ENCOUNTER — APPOINTMENT (OUTPATIENT)
Dept: OTOLARYNGOLOGY | Facility: CLINIC | Age: 64
End: 2024-12-26
Payer: COMMERCIAL

## 2024-12-26 VITALS — BODY MASS INDEX: 29.82 KG/M2 | HEIGHT: 73 IN | WEIGHT: 225 LBS

## 2024-12-26 DIAGNOSIS — Q35.9 CLEFT PALATE, UNSPECIFIED: ICD-10-CM

## 2024-12-26 DIAGNOSIS — J32.8 OTHER CHRONIC SINUSITIS: ICD-10-CM

## 2024-12-26 PROCEDURE — 99213 OFFICE O/P EST LOW 20 MIN: CPT | Mod: 25

## 2024-12-26 PROCEDURE — 31231 NASAL ENDOSCOPY DX: CPT

## 2024-12-30 ENCOUNTER — APPOINTMENT (OUTPATIENT)
Dept: OTOLARYNGOLOGY | Facility: CLINIC | Age: 64
End: 2024-12-30
Payer: COMMERCIAL

## 2024-12-30 ENCOUNTER — APPOINTMENT (OUTPATIENT)
Dept: OTOLARYNGOLOGY | Facility: CLINIC | Age: 64
End: 2024-12-30

## 2024-12-30 DIAGNOSIS — H90.3 SENSORINEURAL HEARING LOSS, BILATERAL: ICD-10-CM

## 2024-12-30 PROCEDURE — V5299A: CUSTOM

## 2024-12-30 PROCEDURE — 92550 TYMPANOMETRY & REFLEX THRESH: CPT | Mod: 52

## 2024-12-30 PROCEDURE — 92557 COMPREHENSIVE HEARING TEST: CPT

## 2024-12-30 PROCEDURE — 99213 OFFICE O/P EST LOW 20 MIN: CPT

## 2025-01-16 ENCOUNTER — APPOINTMENT (OUTPATIENT)
Dept: OTOLARYNGOLOGY | Facility: CLINIC | Age: 65
End: 2025-01-16
Payer: COMMERCIAL

## 2025-01-16 PROCEDURE — V5014F: CUSTOM

## 2025-05-01 ENCOUNTER — APPOINTMENT (OUTPATIENT)
Dept: GASTROENTEROLOGY | Facility: CLINIC | Age: 65
End: 2025-05-01

## 2025-05-01 DIAGNOSIS — K21.9 GASTRO-ESOPHAGEAL REFLUX DISEASE W/OUT ESOPHAGITIS: ICD-10-CM

## 2025-05-01 DIAGNOSIS — Z12.11 ENCOUNTER FOR SCREENING FOR MALIGNANT NEOPLASM OF COLON: ICD-10-CM

## 2025-05-01 DIAGNOSIS — K25.9 GASTRIC ULCER, UNSPECIFIED AS ACUTE OR CHRONIC, W/OUT HEMORRHAGE OR PERFORATION: ICD-10-CM

## 2025-05-01 DIAGNOSIS — B96.81 PEPTIC ULCER, SITE UNSPECIFIED, UNSPECIFIED AS ACUTE OR CHRONIC, W/OUT HEMORRHAGE OR PERFORATION: ICD-10-CM

## 2025-05-01 DIAGNOSIS — K27.9 PEPTIC ULCER, SITE UNSPECIFIED, UNSPECIFIED AS ACUTE OR CHRONIC, W/OUT HEMORRHAGE OR PERFORATION: ICD-10-CM

## 2025-05-01 DIAGNOSIS — K63.5 POLYP OF COLON: ICD-10-CM

## 2025-05-01 PROCEDURE — 99214 OFFICE O/P EST MOD 30 MIN: CPT | Mod: 95

## 2025-05-01 RX ORDER — POLYETHYLENE GLYCOL 3350, SODIUM SULFATE, POTASSIUM CHLORIDE, MAGNESIUM SULFATE, AND SODIUM CHLORIDE FOR ORAL SOLUTION 178.7-7.3G
178.7 KIT ORAL
Qty: 1 | Refills: 0 | Status: ACTIVE | COMMUNITY
Start: 2025-05-01 | End: 1900-01-01

## 2025-05-08 ENCOUNTER — APPOINTMENT (OUTPATIENT)
Dept: PULMONOLOGY | Facility: CLINIC | Age: 65
End: 2025-05-08
Payer: COMMERCIAL

## 2025-05-08 VITALS
DIASTOLIC BLOOD PRESSURE: 80 MMHG | HEIGHT: 73 IN | BODY MASS INDEX: 29.29 KG/M2 | OXYGEN SATURATION: 97 % | WEIGHT: 221 LBS | SYSTOLIC BLOOD PRESSURE: 110 MMHG | HEART RATE: 93 BPM | RESPIRATION RATE: 14 BRPM

## 2025-05-08 DIAGNOSIS — R93.89 ABNORMAL FINDINGS ON DIAGNOSTIC IMAGING OF OTHER SPECIFIED BODY STRUCTURES: ICD-10-CM

## 2025-05-08 DIAGNOSIS — J44.9 CHRONIC OBSTRUCTIVE PULMONARY DISEASE, UNSPECIFIED: ICD-10-CM

## 2025-05-08 PROCEDURE — G0296 VISIT TO DETERM LDCT ELIG: CPT

## 2025-05-08 PROCEDURE — 99214 OFFICE O/P EST MOD 30 MIN: CPT

## 2025-05-09 ENCOUNTER — DOCTOR'S OFFICE (OUTPATIENT)
Dept: URBAN - METROPOLITAN AREA CLINIC 162 | Facility: CLINIC | Age: 65
Setting detail: OPHTHALMOLOGY
End: 2025-05-09
Payer: COMMERCIAL

## 2025-05-09 DIAGNOSIS — H52.4: ICD-10-CM

## 2025-05-09 DIAGNOSIS — H25.13: ICD-10-CM

## 2025-05-09 PROBLEM — H53.031 AMBLYOPIA STRABISMIC; RIGHT EYE: Status: ACTIVE | Noted: 2025-05-09

## 2025-05-09 PROBLEM — H43.811 PVD; RIGHT EYE: Status: ACTIVE | Noted: 2025-05-09

## 2025-05-09 PROBLEM — H50.011 ESOTROPIA, MONOCULAR, RIGHT EYE: Status: ACTIVE | Noted: 2025-05-09

## 2025-05-09 PROCEDURE — 92004 COMPRE OPH EXAM NEW PT 1/>: CPT | Performed by: OPTOMETRIST

## 2025-05-09 PROCEDURE — 92015 DETERMINE REFRACTIVE STATE: CPT | Performed by: OPTOMETRIST

## 2025-05-09 ASSESSMENT — REFRACTION_MANIFEST
OS_CYLINDER: -0.75
OS_ADD: +2.25
OS_VA1: 20/25+
OS_AXIS: 180
OD_SPHERE: +3.75
OD_VA1: 20/30+
OD_ADD: +2.25
OD_CYLINDER: -1.00
OD_AXIS: 070
OS_SPHERE: +4.50

## 2025-05-09 ASSESSMENT — REFRACTION_CURRENTRX
OD_CYLINDER: -0.50
OD_OVR_VA: 20/
OD_AXIS: 083
OS_ADD: +2.25
OD_SPHERE: +3.50
OS_VPRISM_DIRECTION: PROGS
OS_CYLINDER: -0.50
OD_ADD: +2.25
OD_VPRISM_DIRECTION: PROGS
OS_OVR_VA: 20/
OS_SPHERE: +4.25
OS_AXIS: 180

## 2025-05-09 ASSESSMENT — KERATOMETRY
OD_AXISANGLE_DEGREES: 147
OS_K1POWER_DIOPTERS: 43.00
OS_K2POWER_DIOPTERS: 43.50
OS_AXISANGLE_DEGREES: 096
OD_K2POWER_DIOPTERS: 43.50
OD_K1POWER_DIOPTERS: 43.25

## 2025-05-09 ASSESSMENT — VISUAL ACUITY
OD_BCVA: 20/25
OS_BCVA: 20/40

## 2025-05-09 ASSESSMENT — REFRACTION_AUTOREFRACTION
OD_SPHERE: +4.00
OS_CYLINDER: -0.25
OD_AXIS: 068
OD_CYLINDER: -1.00
OS_SPHERE: +4.50
OS_AXIS: 054

## 2025-05-09 ASSESSMENT — TONOMETRY
OD_IOP_MMHG: 14
OS_IOP_MMHG: 13

## 2025-05-09 ASSESSMENT — CONFRONTATIONAL VISUAL FIELD TEST (CVF)
OS_FINDINGS: FULL
OD_FINDINGS: FULL

## 2025-06-20 ENCOUNTER — TRANSCRIPTION ENCOUNTER (OUTPATIENT)
Age: 65
End: 2025-06-20

## 2025-06-20 ENCOUNTER — OUTPATIENT (OUTPATIENT)
Dept: OUTPATIENT SERVICES | Facility: HOSPITAL | Age: 65
LOS: 1 days | Discharge: HOME | End: 2025-06-20
Payer: COMMERCIAL

## 2025-06-20 ENCOUNTER — RESULT REVIEW (OUTPATIENT)
Age: 65
End: 2025-06-20

## 2025-06-20 VITALS
OXYGEN SATURATION: 95 % | DIASTOLIC BLOOD PRESSURE: 86 MMHG | RESPIRATION RATE: 18 BRPM | HEART RATE: 90 BPM | SYSTOLIC BLOOD PRESSURE: 125 MMHG

## 2025-06-20 VITALS
OXYGEN SATURATION: 97 % | HEIGHT: 73 IN | DIASTOLIC BLOOD PRESSURE: 78 MMHG | RESPIRATION RATE: 18 BRPM | TEMPERATURE: 97 F | HEART RATE: 97 BPM | SYSTOLIC BLOOD PRESSURE: 170 MMHG | WEIGHT: 225.09 LBS

## 2025-06-20 DIAGNOSIS — K21.9 GASTRO-ESOPHAGEAL REFLUX DISEASE WITHOUT ESOPHAGITIS: ICD-10-CM

## 2025-06-20 DIAGNOSIS — K57.30 DIVERTICULOSIS OF LARGE INTESTINE WITHOUT PERFORATION OR ABSCESS WITHOUT BLEEDING: ICD-10-CM

## 2025-06-20 DIAGNOSIS — Z90.49 ACQUIRED ABSENCE OF OTHER SPECIFIED PARTS OF DIGESTIVE TRACT: Chronic | ICD-10-CM

## 2025-06-20 PROCEDURE — 45388 COLONOSCOPY W/ABLATION: CPT

## 2025-06-20 PROCEDURE — 45385 COLONOSCOPY W/LESION REMOVAL: CPT | Mod: XU

## 2025-06-20 PROCEDURE — 88305 TISSUE EXAM BY PATHOLOGIST: CPT | Mod: 26

## 2025-06-20 PROCEDURE — 88305 TISSUE EXAM BY PATHOLOGIST: CPT

## 2025-06-20 RX ORDER — ROSUVASTATIN CALCIUM 20 MG/1
1 TABLET, FILM COATED ORAL
Refills: 0 | DISCHARGE

## 2025-06-20 NOTE — ASU DISCHARGE PLAN (ADULT/PEDIATRIC) - FINANCIAL ASSISTANCE
[facility] provides services at a reduced cost to those who are determined to be eligible through [facility]’s financial assistance program. Information regarding [facility]’s financial assistance program can be found by going to [email or web address] or by calling [phone].

## 2025-06-20 NOTE — PACU DISCHARGE NOTE - NSPTMEETSDISCHCRITERIADT_GEN_A_CORE
How Severe Are Your Spot(S)?: mild What Is The Reason For Today's Visit?: Full Body Skin Examination What Is The Reason For Today's Visit? (Being Monitored For X): the re-examination of lesions previously examined 20-Jun-2025 14:31

## 2025-06-20 NOTE — ASU PATIENT PROFILE, ADULT - DOES PATIENT HAVE ADVANCE DIRECTIVE
DISCHARGE NOTE      Pt remains on bedrest until 1800. Procedural site remains dry and intact with good circulation, motion, and sensation. No signs and symptoms of bleeding/hematoma noted. Instruction provided, patient/family verbalizes understanding. Dr. Samy Ivey spoke with patient/family post procedure. Follow up Appointment: as already scheduled    Cardiac Rehab spoke with patient. Dietician notified of consult. Perclose pamphlet and patient's stent card given to patient's wife. Bedside handoff given to [facility], site assessment done. Plan for patient to discharge home after 6 hour recovery tonight.
No

## 2025-06-20 NOTE — ASU PATIENT PROFILE, ADULT - TEACHING/LEARNING FACTORS INFLUENCE READINESS TO LEARN
Ultrasound guided right thoracentesis performed by Dr Mathis. 400 mL removed. Sample sent for cytology per pulmonary note. Stat chest xray ordered.   
none

## 2025-06-20 NOTE — ASU DISCHARGE PLAN (ADULT/PEDIATRIC) - CARE PROVIDER_API CALL
Ron Garber  Gastroenterology  [address] [place] [address]  Phone: [phone]  Fax: [phone]  Follow Up Time:

## 2025-06-20 NOTE — ASU PATIENT PROFILE, ADULT - FALL HARM RISK - UNIVERSAL INTERVENTIONS
Bed in lowest position, wheels locked, appropriate side rails in place/Call bell, personal items and telephone in reach/Instruct patient to call for assistance before getting out of bed or chair/Non-slip footwear when patient is out of bed/[place] to call system/Physically safe environment - no spills, clutter or unnecessary equipment/Purposeful Proactive Rounding/Room/bathroom lighting operational, light cord in reach

## 2025-06-26 LAB — SURGICAL PATHOLOGY STUDY: SIGNIFICANT CHANGE UP

## 2025-06-27 DIAGNOSIS — F17.200 NICOTINE DEPENDENCE, UNSPECIFIED, UNCOMPLICATED: ICD-10-CM

## 2025-06-27 DIAGNOSIS — I25.10 ATHEROSCLEROTIC HEART DISEASE OF NATIVE CORONARY ARTERY WITHOUT ANGINA PECTORIS: ICD-10-CM

## 2025-06-27 DIAGNOSIS — K64.4 RESIDUAL HEMORRHOIDAL SKIN TAGS: ICD-10-CM

## 2025-06-27 DIAGNOSIS — E78.00 PURE HYPERCHOLESTEROLEMIA, UNSPECIFIED: ICD-10-CM

## 2025-06-27 DIAGNOSIS — Z12.11 ENCOUNTER FOR SCREENING FOR MALIGNANT NEOPLASM OF COLON: ICD-10-CM

## 2025-06-27 DIAGNOSIS — D12.3 BENIGN NEOPLASM OF TRANSVERSE COLON: ICD-10-CM

## 2025-06-27 DIAGNOSIS — Z95.5 PRESENCE OF CORONARY ANGIOPLASTY IMPLANT AND GRAFT: ICD-10-CM

## 2025-06-27 DIAGNOSIS — K63.5 POLYP OF COLON: ICD-10-CM

## 2025-06-27 DIAGNOSIS — K64.8 OTHER HEMORRHOIDS: ICD-10-CM

## 2025-06-27 DIAGNOSIS — K57.30 DIVERTICULOSIS OF LARGE INTESTINE WITHOUT PERFORATION OR ABSCESS WITHOUT BLEEDING: ICD-10-CM

## 2025-06-27 DIAGNOSIS — J44.9 CHRONIC OBSTRUCTIVE PULMONARY DISEASE, UNSPECIFIED: ICD-10-CM

## 2025-06-27 DIAGNOSIS — I10 ESSENTIAL (PRIMARY) HYPERTENSION: ICD-10-CM

## 2025-06-27 DIAGNOSIS — K62.1 RECTAL POLYP: ICD-10-CM

## 2025-06-27 DIAGNOSIS — Z79.82 LONG TERM (CURRENT) USE OF ASPIRIN: ICD-10-CM

## 2025-06-27 DIAGNOSIS — K55.21 ANGIODYSPLASIA OF COLON WITH HEMORRHAGE: ICD-10-CM

## 2025-07-18 NOTE — H&P PST ADULT - SPO2 (%)
[FreeTextEntry1] : # Nausea - intermittent, with no nausea. No clear triggers. DDx for nausea is broad and includes GI and non GI issues. Noted recent EGD with no obstruction. Will refer to NM gastric emptying. Discussed small frequent meals. # GERD - Refractory GERD per pH impedance. Now started on rabeprazole. Will add Gaviscon.  # Weight loss - Discussed weight loss strategies. # Constipation - Improved, off Linzess. Continues with MiraLAX PRN. Patient will keep a BM log. # Hemorrhoids - resolved with steroidal cream. Discussed need for high fiber diet and laxatives PRN.  Plan: - NM gastric emptying - Rabeprazole BID - Gaviscon TID - MiraLAX PRN - Food log - BM log - RTC in 2 months.
100

## 2025-07-23 ENCOUNTER — APPOINTMENT (OUTPATIENT)
Dept: GASTROENTEROLOGY | Facility: CLINIC | Age: 65
End: 2025-07-23
Payer: COMMERCIAL

## 2025-07-23 DIAGNOSIS — Z86.79 PERSONAL HISTORY OF OTHER DISEASES OF THE CIRCULATORY SYSTEM: ICD-10-CM

## 2025-07-23 DIAGNOSIS — K21.9 GASTRO-ESOPHAGEAL REFLUX DISEASE W/OUT ESOPHAGITIS: ICD-10-CM

## 2025-07-23 DIAGNOSIS — Q27.30 ARTERIOVENOUS MALFORMATION, SITE UNSPECIFIED: ICD-10-CM

## 2025-07-23 DIAGNOSIS — K63.5 POLYP OF COLON: ICD-10-CM

## 2025-07-23 PROCEDURE — 99213 OFFICE O/P EST LOW 20 MIN: CPT | Mod: 95
